# Patient Record
Sex: MALE | Race: WHITE | NOT HISPANIC OR LATINO | Employment: FULL TIME | URBAN - METROPOLITAN AREA
[De-identification: names, ages, dates, MRNs, and addresses within clinical notes are randomized per-mention and may not be internally consistent; named-entity substitution may affect disease eponyms.]

---

## 2019-08-18 ENCOUNTER — HOSPITAL ENCOUNTER (INPATIENT)
Facility: HOSPITAL | Age: 64
LOS: 5 days | Discharge: HOME | End: 2019-08-23
Attending: EMERGENCY MEDICINE | Admitting: INTERNAL MEDICINE

## 2019-08-18 ENCOUNTER — APPOINTMENT (OUTPATIENT)
Dept: GENERAL RADIOLOGY | Facility: HOSPITAL | Age: 64
End: 2019-08-18

## 2019-08-18 DIAGNOSIS — I21.19 ACUTE MI, INFEROPOSTERIOR WALL (HCC): Primary | ICD-10-CM

## 2019-08-18 DIAGNOSIS — I49.01 VENTRICULAR FIBRILLATION (HCC): ICD-10-CM

## 2019-08-18 LAB
ALBUMIN SERPL-MCNC: 4.9 G/DL (ref 3.5–5.2)
ALBUMIN/GLOB SERPL: 1.6 G/DL
ALP SERPL-CCNC: 70 U/L (ref 39–117)
ALT SERPL W P-5'-P-CCNC: 29 U/L (ref 1–41)
ANION GAP SERPL CALCULATED.3IONS-SCNC: 17.3 MMOL/L (ref 5–15)
AST SERPL-CCNC: 35 U/L (ref 1–40)
BASOPHILS # BLD AUTO: 0.17 10*3/MM3 (ref 0–0.2)
BASOPHILS NFR BLD AUTO: 1 % (ref 0–1.5)
BILIRUB SERPL-MCNC: 0.4 MG/DL (ref 0.2–1.2)
BUN BLD-MCNC: 12 MG/DL (ref 8–23)
BUN/CREAT SERPL: 8.6 (ref 7–25)
CALCIUM SPEC-SCNC: 9.7 MG/DL (ref 8.6–10.5)
CHLORIDE SERPL-SCNC: 95 MMOL/L (ref 98–107)
CO2 SERPL-SCNC: 26.7 MMOL/L (ref 22–29)
CREAT BLD-MCNC: 1.4 MG/DL (ref 0.76–1.27)
DEPRECATED RDW RBC AUTO: 53.2 FL (ref 37–54)
EOSINOPHIL # BLD AUTO: 0.56 10*3/MM3 (ref 0–0.4)
EOSINOPHIL NFR BLD AUTO: 3.4 % (ref 0.3–6.2)
ERYTHROCYTE [DISTWIDTH] IN BLOOD BY AUTOMATED COUNT: 15.3 % (ref 12.3–15.4)
GFR SERPL CREATININE-BSD FRML MDRD: 51 ML/MIN/1.73
GLOBULIN UR ELPH-MCNC: 3 GM/DL
GLUCOSE BLD-MCNC: 223 MG/DL (ref 65–99)
GLUCOSE BLDC GLUCOMTR-MCNC: 224 MG/DL (ref 70–130)
HCT VFR BLD AUTO: 50 % (ref 37.5–51)
HGB BLD-MCNC: 15.9 G/DL (ref 13–17.7)
HOLD SPECIMEN: NORMAL
HOLD SPECIMEN: NORMAL
IMM GRANULOCYTES # BLD AUTO: 0.08 10*3/MM3 (ref 0–0.05)
IMM GRANULOCYTES NFR BLD AUTO: 0.5 % (ref 0–0.5)
INR PPP: 0.91 (ref 0.9–1.1)
LYMPHOCYTES # BLD AUTO: 4.5 10*3/MM3 (ref 0.7–3.1)
LYMPHOCYTES NFR BLD AUTO: 27.6 % (ref 19.6–45.3)
MAGNESIUM SERPL-MCNC: 1.6 MG/DL (ref 1.6–2.4)
MCH RBC QN AUTO: 30 PG (ref 26.6–33)
MCHC RBC AUTO-ENTMCNC: 31.8 G/DL (ref 31.5–35.7)
MCV RBC AUTO: 94.3 FL (ref 79–97)
MONOCYTES # BLD AUTO: 0.83 10*3/MM3 (ref 0.1–0.9)
MONOCYTES NFR BLD AUTO: 5.1 % (ref 5–12)
NEUTROPHILS # BLD AUTO: 10.16 10*3/MM3 (ref 1.7–7)
NEUTROPHILS NFR BLD AUTO: 62.4 % (ref 42.7–76)
NRBC BLD AUTO-RTO: 0.1 /100 WBC (ref 0–0.2)
PLATELET # BLD AUTO: 450 10*3/MM3 (ref 140–450)
PMV BLD AUTO: 11.8 FL (ref 6–12)
POTASSIUM BLD-SCNC: 3.4 MMOL/L (ref 3.5–5.2)
POTASSIUM BLD-SCNC: 4.8 MMOL/L (ref 3.5–5.2)
PROT SERPL-MCNC: 7.9 G/DL (ref 6–8.5)
PROTHROMBIN TIME: 11.9 SECONDS (ref 11.7–14.2)
RBC # BLD AUTO: 5.3 10*6/MM3 (ref 4.14–5.8)
SODIUM BLD-SCNC: 139 MMOL/L (ref 136–145)
TROPONIN T SERPL-MCNC: 2.92 NG/ML (ref 0–0.03)
TROPONIN T SERPL-MCNC: <0.01 NG/ML (ref 0–0.03)
WBC NRBC COR # BLD: 16.3 10*3/MM3 (ref 3.4–10.8)
WHOLE BLOOD HOLD SPECIMEN: NORMAL
WHOLE BLOOD HOLD SPECIMEN: NORMAL

## 2019-08-18 PROCEDURE — C1725 CATH, TRANSLUMIN NON-LASER: HCPCS | Performed by: INTERNAL MEDICINE

## 2019-08-18 PROCEDURE — 92950 HEART/LUNG RESUSCITATION CPR: CPT

## 2019-08-18 PROCEDURE — 4A023N7 MEASUREMENT OF CARDIAC SAMPLING AND PRESSURE, LEFT HEART, PERCUTANEOUS APPROACH: ICD-10-PCS | Performed by: INTERNAL MEDICINE

## 2019-08-18 PROCEDURE — 25010000002 BH (CUPID ONLY) ADENOSINE 6 MG/100ML MIXTURE: Performed by: INTERNAL MEDICINE

## 2019-08-18 PROCEDURE — 93458 L HRT ARTERY/VENTRICLE ANGIO: CPT | Performed by: INTERNAL MEDICINE

## 2019-08-18 PROCEDURE — 93005 ELECTROCARDIOGRAM TRACING: CPT | Performed by: EMERGENCY MEDICINE

## 2019-08-18 PROCEDURE — B2151ZZ FLUOROSCOPY OF LEFT HEART USING LOW OSMOLAR CONTRAST: ICD-10-PCS | Performed by: INTERNAL MEDICINE

## 2019-08-18 PROCEDURE — 84484 ASSAY OF TROPONIN QUANT: CPT | Performed by: EMERGENCY MEDICINE

## 2019-08-18 PROCEDURE — 25010000002 MIDAZOLAM PER 1 MG: Performed by: INTERNAL MEDICINE

## 2019-08-18 PROCEDURE — 71045 X-RAY EXAM CHEST 1 VIEW: CPT

## 2019-08-18 PROCEDURE — C9606 PERC D-E COR REVASC W AMI S: HCPCS | Performed by: INTERNAL MEDICINE

## 2019-08-18 PROCEDURE — 25010000002 AMIODARONE PER 30 MG

## 2019-08-18 PROCEDURE — 80053 COMPREHEN METABOLIC PANEL: CPT | Performed by: EMERGENCY MEDICINE

## 2019-08-18 PROCEDURE — 82962 GLUCOSE BLOOD TEST: CPT

## 2019-08-18 PROCEDURE — 93005 ELECTROCARDIOGRAM TRACING: CPT | Performed by: INTERNAL MEDICINE

## 2019-08-18 PROCEDURE — C1874 STENT, COATED/COV W/DEL SYS: HCPCS | Performed by: INTERNAL MEDICINE

## 2019-08-18 PROCEDURE — 85347 COAGULATION TIME ACTIVATED: CPT

## 2019-08-18 PROCEDURE — C1894 INTRO/SHEATH, NON-LASER: HCPCS | Performed by: INTERNAL MEDICINE

## 2019-08-18 PROCEDURE — 85025 COMPLETE CBC W/AUTO DIFF WBC: CPT | Performed by: EMERGENCY MEDICINE

## 2019-08-18 PROCEDURE — 99153 MOD SED SAME PHYS/QHP EA: CPT | Performed by: INTERNAL MEDICINE

## 2019-08-18 PROCEDURE — 25010000002 MAGNESIUM SULFATE IN D5W 1G/100ML (PREMIX) 1-5 GM/100ML-% SOLUTION: Performed by: INTERNAL MEDICINE

## 2019-08-18 PROCEDURE — 83735 ASSAY OF MAGNESIUM: CPT | Performed by: INTERNAL MEDICINE

## 2019-08-18 PROCEDURE — 84132 ASSAY OF SERUM POTASSIUM: CPT | Performed by: INTERNAL MEDICINE

## 2019-08-18 PROCEDURE — 25010000002 EPTIFIBATIDE PER 5 MG: Performed by: INTERNAL MEDICINE

## 2019-08-18 PROCEDURE — 0 IOPAMIDOL PER 1 ML: Performed by: INTERNAL MEDICINE

## 2019-08-18 PROCEDURE — 25010000002 FENTANYL CITRATE (PF) 100 MCG/2ML SOLUTION: Performed by: INTERNAL MEDICINE

## 2019-08-18 PROCEDURE — 93005 ELECTROCARDIOGRAM TRACING: CPT

## 2019-08-18 PROCEDURE — C1769 GUIDE WIRE: HCPCS | Performed by: INTERNAL MEDICINE

## 2019-08-18 PROCEDURE — 92960 CARDIOVERSION ELECTRIC EXT: CPT

## 2019-08-18 PROCEDURE — 99284 EMERGENCY DEPT VISIT MOD MDM: CPT

## 2019-08-18 PROCEDURE — 25010000002 MORPHINE SULFATE (PF) 2 MG/ML SOLUTION: Performed by: INTERNAL MEDICINE

## 2019-08-18 PROCEDURE — 027034Z DILATION OF CORONARY ARTERY, ONE ARTERY WITH DRUG-ELUTING INTRALUMINAL DEVICE, PERCUTANEOUS APPROACH: ICD-10-PCS | Performed by: INTERNAL MEDICINE

## 2019-08-18 PROCEDURE — C1887 CATHETER, GUIDING: HCPCS

## 2019-08-18 PROCEDURE — B2111ZZ FLUOROSCOPY OF MULTIPLE CORONARY ARTERIES USING LOW OSMOLAR CONTRAST: ICD-10-PCS | Performed by: INTERNAL MEDICINE

## 2019-08-18 PROCEDURE — 25010000002 HEPARIN (PORCINE) PER 1000 UNITS: Performed by: INTERNAL MEDICINE

## 2019-08-18 PROCEDURE — 25010000002 ONDANSETRON PER 1 MG: Performed by: INTERNAL MEDICINE

## 2019-08-18 PROCEDURE — 85610 PROTHROMBIN TIME: CPT | Performed by: EMERGENCY MEDICINE

## 2019-08-18 PROCEDURE — C1887 CATHETER, GUIDING: HCPCS | Performed by: INTERNAL MEDICINE

## 2019-08-18 PROCEDURE — 25010000002 PHENYLEPHRINE PER 1 ML: Performed by: INTERNAL MEDICINE

## 2019-08-18 PROCEDURE — 99223 1ST HOSP IP/OBS HIGH 75: CPT | Performed by: INTERNAL MEDICINE

## 2019-08-18 PROCEDURE — 92941 PRQ TRLML REVSC TOT OCCL AMI: CPT | Performed by: INTERNAL MEDICINE

## 2019-08-18 PROCEDURE — 93010 ELECTROCARDIOGRAM REPORT: CPT | Performed by: INTERNAL MEDICINE

## 2019-08-18 PROCEDURE — 99152 MOD SED SAME PHYS/QHP 5/>YRS: CPT | Performed by: INTERNAL MEDICINE

## 2019-08-18 DEVICE — XIENCE SIERRA™ EVEROLIMUS ELUTING CORONARY STENT SYSTEM 4.00 MM X 38 MM / RAPID-EXCHANGE
Type: IMPLANTABLE DEVICE | Status: FUNCTIONAL
Brand: XIENCE SIERRA™

## 2019-08-18 DEVICE — XIENCE SIERRA™ EVEROLIMUS ELUTING CORONARY STENT SYSTEM 4.00 MM X 23 MM / RAPID-EXCHANGE
Type: IMPLANTABLE DEVICE | Status: FUNCTIONAL
Brand: XIENCE SIERRA™

## 2019-08-18 RX ORDER — SODIUM CHLORIDE 0.9 % (FLUSH) 0.9 %
10 SYRINGE (ML) INJECTION AS NEEDED
Status: DISCONTINUED | OUTPATIENT
Start: 2019-08-18 | End: 2019-08-23 | Stop reason: HOSPADM

## 2019-08-18 RX ORDER — HYDROCHLOROTHIAZIDE 12.5 MG/1
25 CAPSULE, GELATIN COATED ORAL DAILY
COMMUNITY
End: 2019-08-23 | Stop reason: HOSPADM

## 2019-08-18 RX ORDER — EPTIFIBATIDE 0.75 MG/ML
INJECTION, SOLUTION INTRAVENOUS CONTINUOUS PRN
Status: COMPLETED | OUTPATIENT
Start: 2019-08-18 | End: 2019-08-18

## 2019-08-18 RX ORDER — MAGNESIUM SULFATE 1 G/100ML
1 INJECTION INTRAVENOUS AS NEEDED
Status: DISCONTINUED | OUTPATIENT
Start: 2019-08-18 | End: 2019-08-23 | Stop reason: HOSPADM

## 2019-08-18 RX ORDER — FENOFIBRATE 145 MG/1
145 TABLET, COATED ORAL DAILY
COMMUNITY
End: 2019-08-23 | Stop reason: HOSPADM

## 2019-08-18 RX ORDER — ASPIRIN 81 MG/1
TABLET, CHEWABLE ORAL
Status: COMPLETED | OUTPATIENT
Start: 2019-08-18 | End: 2019-08-18

## 2019-08-18 RX ORDER — POTASSIUM CHLORIDE 750 MG/1
40 CAPSULE, EXTENDED RELEASE ORAL AS NEEDED
Status: DISCONTINUED | OUTPATIENT
Start: 2019-08-18 | End: 2019-08-23 | Stop reason: HOSPADM

## 2019-08-18 RX ORDER — HYDROCODONE BITARTRATE AND ACETAMINOPHEN 10; 325 MG/1; MG/1
1 TABLET ORAL EVERY 6 HOURS PRN
COMMUNITY

## 2019-08-18 RX ORDER — LIDOCAINE HYDROCHLORIDE 20 MG/ML
INJECTION, SOLUTION INFILTRATION; PERINEURAL AS NEEDED
Status: DISCONTINUED | OUTPATIENT
Start: 2019-08-18 | End: 2019-08-18 | Stop reason: HOSPADM

## 2019-08-18 RX ORDER — HEPARIN SODIUM 5000 [USP'U]/ML
INJECTION, SOLUTION INTRAVENOUS; SUBCUTANEOUS
Status: COMPLETED | OUTPATIENT
Start: 2019-08-18 | End: 2019-08-18

## 2019-08-18 RX ORDER — HEPARIN SODIUM 1000 [USP'U]/ML
INJECTION, SOLUTION INTRAVENOUS; SUBCUTANEOUS AS NEEDED
Status: DISCONTINUED | OUTPATIENT
Start: 2019-08-18 | End: 2019-08-18 | Stop reason: HOSPADM

## 2019-08-18 RX ORDER — ONDANSETRON 2 MG/ML
4 INJECTION INTRAMUSCULAR; INTRAVENOUS EVERY 4 HOURS PRN
Status: DISCONTINUED | OUTPATIENT
Start: 2019-08-18 | End: 2019-08-23 | Stop reason: HOSPADM

## 2019-08-18 RX ORDER — TAMSULOSIN HYDROCHLORIDE 0.4 MG/1
1 CAPSULE ORAL NIGHTLY
COMMUNITY

## 2019-08-18 RX ORDER — PANTOPRAZOLE SODIUM 40 MG/1
40 TABLET, DELAYED RELEASE ORAL DAILY
COMMUNITY

## 2019-08-18 RX ORDER — NITROGLYCERIN 20 MG/100ML
INJECTION INTRAVENOUS
Status: COMPLETED | OUTPATIENT
Start: 2019-08-18 | End: 2019-08-18

## 2019-08-18 RX ORDER — ONDANSETRON 2 MG/ML
INJECTION INTRAMUSCULAR; INTRAVENOUS AS NEEDED
Status: DISCONTINUED | OUTPATIENT
Start: 2019-08-18 | End: 2019-08-18 | Stop reason: HOSPADM

## 2019-08-18 RX ORDER — SODIUM NITROPRUSSIDE 25 MG/ML
INJECTION INTRAVENOUS AS NEEDED
Status: DISCONTINUED | OUTPATIENT
Start: 2019-08-18 | End: 2019-08-18 | Stop reason: HOSPADM

## 2019-08-18 RX ORDER — NITROGLYCERIN 20 MG/100ML
INJECTION INTRAVENOUS CONTINUOUS PRN
Status: COMPLETED | OUTPATIENT
Start: 2019-08-18 | End: 2019-08-18

## 2019-08-18 RX ORDER — AMLODIPINE BESYLATE 2.5 MG/1
2.5 TABLET ORAL DAILY
COMMUNITY
End: 2019-08-23 | Stop reason: HOSPADM

## 2019-08-18 RX ORDER — FLUTICASONE PROPIONATE 50 MCG
2 SPRAY, SUSPENSION (ML) NASAL DAILY PRN
COMMUNITY

## 2019-08-18 RX ORDER — MAGNESIUM SULFATE HEPTAHYDRATE 40 MG/ML
2 INJECTION, SOLUTION INTRAVENOUS AS NEEDED
Status: DISCONTINUED | OUTPATIENT
Start: 2019-08-18 | End: 2019-08-23 | Stop reason: HOSPADM

## 2019-08-18 RX ORDER — ASPIRIN 325 MG
325 TABLET ORAL ONCE
Status: DISCONTINUED | OUTPATIENT
Start: 2019-08-18 | End: 2019-08-19

## 2019-08-18 RX ORDER — MORPHINE SULFATE 2 MG/ML
INJECTION, SOLUTION INTRAMUSCULAR; INTRAVENOUS AS NEEDED
Status: DISCONTINUED | OUTPATIENT
Start: 2019-08-18 | End: 2019-08-18 | Stop reason: HOSPADM

## 2019-08-18 RX ORDER — EPTIFIBATIDE 0.75 MG/ML
2 INJECTION, SOLUTION INTRAVENOUS CONTINUOUS
Status: DISPENSED | OUTPATIENT
Start: 2019-08-18 | End: 2019-08-19

## 2019-08-18 RX ORDER — ATORVASTATIN CALCIUM 20 MG/1
40 TABLET, FILM COATED ORAL NIGHTLY
Status: DISCONTINUED | OUTPATIENT
Start: 2019-08-18 | End: 2019-08-23 | Stop reason: HOSPADM

## 2019-08-18 RX ORDER — FENTANYL CITRATE 50 UG/ML
INJECTION, SOLUTION INTRAMUSCULAR; INTRAVENOUS AS NEEDED
Status: DISCONTINUED | OUTPATIENT
Start: 2019-08-18 | End: 2019-08-18 | Stop reason: HOSPADM

## 2019-08-18 RX ORDER — HYDROCODONE BITARTRATE AND ACETAMINOPHEN 5; 325 MG/1; MG/1
2 TABLET ORAL EVERY 6 HOURS PRN
Status: DISCONTINUED | OUTPATIENT
Start: 2019-08-18 | End: 2019-08-23 | Stop reason: HOSPADM

## 2019-08-18 RX ORDER — EPTIFIBATIDE 20 MG/10ML
180 INJECTION INTRAVENOUS ONCE
Status: DISCONTINUED | OUTPATIENT
Start: 2019-08-18 | End: 2019-08-20

## 2019-08-18 RX ORDER — POTASSIUM CHLORIDE 1.5 G/1.77G
40 POWDER, FOR SOLUTION ORAL AS NEEDED
Status: DISCONTINUED | OUTPATIENT
Start: 2019-08-18 | End: 2019-08-23 | Stop reason: HOSPADM

## 2019-08-18 RX ORDER — LIDOCAINE HYDROCHLORIDE ANHYDROUS AND DEXTROSE MONOHYDRATE 5; 400 G/100ML; MG/100ML
1 INJECTION, SOLUTION INTRAVENOUS CONTINUOUS
Status: DISCONTINUED | OUTPATIENT
Start: 2019-08-18 | End: 2019-08-19

## 2019-08-18 RX ORDER — NITROGLYCERIN 20 MG/100ML
5-200 INJECTION INTRAVENOUS
Status: DISCONTINUED | OUTPATIENT
Start: 2019-08-18 | End: 2019-08-19

## 2019-08-18 RX ORDER — MIDAZOLAM HYDROCHLORIDE 1 MG/ML
INJECTION INTRAMUSCULAR; INTRAVENOUS AS NEEDED
Status: DISCONTINUED | OUTPATIENT
Start: 2019-08-18 | End: 2019-08-18 | Stop reason: HOSPADM

## 2019-08-18 RX ORDER — BACLOFEN 10 MG/1
10 TABLET ORAL 3 TIMES DAILY
COMMUNITY

## 2019-08-18 RX ORDER — ACETAMINOPHEN 325 MG/1
650 TABLET ORAL EVERY 4 HOURS PRN
Status: DISCONTINUED | OUTPATIENT
Start: 2019-08-18 | End: 2019-08-23 | Stop reason: HOSPADM

## 2019-08-18 RX ORDER — METOPROLOL SUCCINATE 25 MG/1
25 TABLET, EXTENDED RELEASE ORAL DAILY
COMMUNITY
End: 2019-08-30 | Stop reason: SDUPTHER

## 2019-08-18 RX ORDER — ASPIRIN 81 MG/1
81 TABLET ORAL DAILY
Status: DISCONTINUED | OUTPATIENT
Start: 2019-08-18 | End: 2019-08-23 | Stop reason: HOSPADM

## 2019-08-18 RX ORDER — CLOPIDOGREL BISULFATE 75 MG/1
75 TABLET ORAL DAILY
Status: DISCONTINUED | OUTPATIENT
Start: 2019-08-19 | End: 2019-08-23 | Stop reason: HOSPADM

## 2019-08-18 RX ORDER — ASPIRIN 81 MG/1
81 TABLET, CHEWABLE ORAL DAILY
COMMUNITY
End: 2019-10-02

## 2019-08-18 RX ORDER — SODIUM CHLORIDE 9 MG/ML
INJECTION, SOLUTION INTRAVENOUS CONTINUOUS PRN
Status: COMPLETED | OUTPATIENT
Start: 2019-08-18 | End: 2019-08-18

## 2019-08-18 RX ORDER — HYDROCODONE BITARTRATE AND ACETAMINOPHEN 5; 325 MG/1; MG/1
1 TABLET ORAL EVERY 4 HOURS PRN
Status: DISCONTINUED | OUTPATIENT
Start: 2019-08-18 | End: 2019-08-18

## 2019-08-18 RX ORDER — NABUMETONE 500 MG/1
500 TABLET, FILM COATED ORAL 2 TIMES DAILY
COMMUNITY
End: 2019-08-23 | Stop reason: HOSPADM

## 2019-08-18 RX ORDER — CLOPIDOGREL BISULFATE 75 MG/1
TABLET ORAL
Status: COMPLETED | OUTPATIENT
Start: 2019-08-18 | End: 2019-08-18

## 2019-08-18 RX ADMIN — HEPARIN SODIUM 4000 UNITS: 5000 INJECTION, SOLUTION INTRAVENOUS; SUBCUTANEOUS at 13:53

## 2019-08-18 RX ADMIN — METOPROLOL TARTRATE 25 MG: 25 TABLET ORAL at 20:02

## 2019-08-18 RX ADMIN — EPTIFIBATIDE 2 MCG/KG/MIN: 0.75 INJECTION, SOLUTION INTRAVENOUS at 23:51

## 2019-08-18 RX ADMIN — METOPROLOL TARTRATE 5 MG: 5 INJECTION, SOLUTION INTRAVENOUS at 17:20

## 2019-08-18 RX ADMIN — METOPROLOL TARTRATE 5 MG: 5 INJECTION, SOLUTION INTRAVENOUS at 16:54

## 2019-08-18 RX ADMIN — NITROGLYCERIN 10 MCG/MIN: 20 INJECTION INTRAVENOUS at 13:46

## 2019-08-18 RX ADMIN — EPTIFIBATIDE 2 MCG/KG/MIN: 0.75 INJECTION, SOLUTION INTRAVENOUS at 20:02

## 2019-08-18 RX ADMIN — ASPIRIN 324 MG: 81 TABLET, CHEWABLE ORAL at 13:45

## 2019-08-18 RX ADMIN — HYDROCODONE BITARTRATE AND ACETAMINOPHEN 2 TABLET: 5; 325 TABLET ORAL at 23:35

## 2019-08-18 RX ADMIN — HYDROCODONE BITARTRATE AND ACETAMINOPHEN 2 TABLET: 5; 325 TABLET ORAL at 16:54

## 2019-08-18 RX ADMIN — CLOPIDOGREL BISULFATE 600 MG: 75 TABLET ORAL at 13:51

## 2019-08-18 RX ADMIN — MAGNESIUM SULFATE 1 G: 1 INJECTION INTRAVENOUS at 23:50

## 2019-08-18 RX ADMIN — ATORVASTATIN CALCIUM 40 MG: 20 TABLET, FILM COATED ORAL at 20:02

## 2019-08-18 RX ADMIN — METOPROLOL TARTRATE 5 MG: 5 INJECTION, SOLUTION INTRAVENOUS at 17:45

## 2019-08-18 RX ADMIN — LIDOCAINE HYDROCHLORIDE 200 MG: 20 INJECTION, SOLUTION INTRAVENOUS at 13:45

## 2019-08-18 NOTE — H&P
Date of Hospital Visit: 19  Encounter Provider: Rod Gomes MD  Place of Service: [facility] CARDIOLOGY  Patient Name: Frank Patel  :1955  [id number]    Chief complaint: Chest pain    History of Present Illness: This is a 64-year-old gentleman with a history of A. fib for which she is been anticoagulated with an aspirin who comes in with acute chest pain for the last 2 hours substernal in origin.  Occluded shortness of breath no nausea or vomiting in the ER he is found to have an acute inferolateral and a little bit of an anterior ST elevation infarct.  He is not had any history of bleeding does not have diabetes hypertension hyperlipidemia or tobacco abuse he is not had any significant history of lung or kidney problems    No past medical history on file.    No past surgical history on file.      (Not in a hospital admission)    Current Meds  No current facility-administered medications on file prior to encounter.      No current outpatient medications on file prior to encounter.       Social History     Socioeconomic History   • Marital status:      Spouse name: Not on file   • Number of children: Not on file   • Years of education: Not on file   • Highest education level: Not on file       Family Hx: Non-contributory    REVIEW OF SYSTEMS:   ROS was performed and is negative except as outlined in HPI     REVIEW OF SYSTEMS:   CONSTITUTIONAL: No weight loss, fever, chills, weakness or fatigue.   HEENT: Eyes: No visual loss, blurred vision, double vision or yellow sclerae. Ears, Nose, Throat: No hearing loss, sneezing, congestion, runny nose or sore throat.   SKIN: No rash or itching.     RESPIRATORY: No shortness of breath, hemoptysis, cough or sputum.   GASTROINTESTINAL: No anorexia, nausea, vomiting or diarrhea. No abdominal pain, bright red blood per rectum or melena.  NEUROLOGICAL: No headache, dizziness, syncope, paralysis, numbness or tingling in the  "extremities.  MUSCULOSKELETAL: No muscle, back pain, joint pain or stiffness.   HEMATOLOGIC: No anemia, bleeding or bruising.   LYMPHATICS: No enlarged nodes.  PSYCHIATRIC: No history of depression, anxiety, hallucinations.   ENDOCRINOLOGIC: No reports of sweating, cold or heat intolerance. No polyuria or polydipsia.        Objective:     Vitals:    08/18/19 1328 08/18/19 1330 08/18/19 1352   BP:   (!) 162/111   Pulse:  94 (!) 152   Resp: 20  20   Temp:  96.8 °F (36 °C)    TempSrc:  Tympanic    SpO2:  97% 100%   Weight:   113 kg (249 lb)   Height: 177.8 cm (70\")       Body mass index is 35.73 kg/m².  Flowsheet Rows      First Filed Value   Admission Height  177.8 cm (70\") Documented at 08/18/2019 1328   Admission Weight  113 kg (249 lb) Documented at 08/18/2019 1352          General Appearance:    Alert, oriented x 3, in no acute distress   Head:    Normocephalic, without obvious abnormality, atraumatic   Ears:    Ears appear intact with no abnormalities noted   Throat:   No oral lesions, dentition good   Neck:   No adenopathy, supple, trachea midline, no thyromegaly, no carotid bruit, no JVD   Lungs:    Breath sounds are equal and  clear to auscultation    Heart:   Normal S1 and S2, RRR, no murmur/gallop or rub   Abdomen:    Normal bowel sounds, obese, soft non-tender, non-distended, no organomegaly, no guarding   Extremities:   Moves all extremities well, no edema, no cyanosis, no redness   Pulses:   Pulses palpable and equal bilaterally. Normal radial pulses   Skin:   No bleeding, bruising or rash   Lymph nodes:   No palpable adenopathy     I personally viewed and interpreted the patient's EKG/Telemetry data    Assessment:  There are no hospital problems to display for this patient.      Plan he is having an acute inferior STEMI I think the anterior changes are probably going to be related to an PDA that extends to the apex.  While he was here in the emergency room he went into ventricular fibrillation had to be " shocked one time I have elected to put him on lidocaine because he was having a little bit of slow rhythm immediately after shock we have initiated heparin and clopidogrel therapy in the emergency room we are going to take him emergently to the Cath Lab as soon as a team gets here this is obviously a life-threatening situation further decisions will be based on the findings at the time of his cath

## 2019-08-18 NOTE — ED NOTES
Pt had episode of VF. Pads placed, defibrillation preformed. Pulses returned. Pt awake alert X4. Dr. Gomes at bedside. Awaiting CATH team arrival.      Yogi Nassar RN  08/18/19 5658

## 2019-08-18 NOTE — ED NOTES
"Pt reports waking up this morning with pain in his upper back. Describes the pain as a \"pressure\" that radiates to his chest. Pt denies injury.       Luisa Chu, RN  08/18/19 2607    "

## 2019-08-18 NOTE — ED PROVIDER NOTES
EMERGENCY DEPARTMENT ENCOUNTER    CHIEF COMPLAINT  Chief Complaint: chest pain  History given by: patient  History limited by: none  Room Number: [facility]  PMD: Cam Duran MD      HPI:  Pt is a 64 y.o. male who presents complaining of constant chest pressure that began around 1130 upon waking. Pain radiates through to the back but not to the LUE. He has associated nausea, faint light headedness, and diaphoresis; but, denies vomiting, HA, fever, chills, and other complaints. H/o afib and takes 81 mg ASA. He denies prior MI and h/o DM. Pt is not a smoker and uses ETOH rarely. Family cardiac h/o CHF (mother).      PAST MEDICAL HISTORY  Active Ambulatory Problems     Diagnosis Date Noted   • No Active Ambulatory Problems     Resolved Ambulatory Problems     Diagnosis Date Noted   • No Resolved Ambulatory Problems     No Additional Past Medical History       PAST SURGICAL HISTORY  No past surgical history on file.    FAMILY HISTORY  No family history on file.    SOCIAL HISTORY  Social History     Socioeconomic History   • Marital status:      Spouse name: Not on file   • Number of children: Not on file   • Years of education: Not on file   • Highest education level: Not on file       ALLERGIES  Patient has no known allergies.    REVIEW OF SYSTEMS  Review of Systems   Constitutional: Negative for activity change, appetite change and fever.   HENT: Negative for congestion and sore throat.    Eyes: Negative.    Respiratory: Positive for shortness of breath. Negative for cough.    Cardiovascular: Positive for chest pain. Negative for leg swelling.   Gastrointestinal: Negative for abdominal pain, diarrhea and vomiting.   Endocrine: Negative.    Genitourinary: Negative for decreased urine volume and dysuria.   Musculoskeletal: Negative for neck pain.   Skin: Negative for rash and wound.   Allergic/Immunologic: Negative.    Neurological: Negative for weakness, numbness and headaches.   Hematological: Negative.     Psychiatric/Behavioral: Negative.    All other systems reviewed and are negative.      PHYSICAL EXAM  ED Triage Vitals   Temp Heart Rate Resp BP SpO2   08/18/19 1330 08/18/19 1330 08/18/19 1328 08/18/19 1352 08/18/19 1330   96.8 °F (36 °C) 94 20 (!) 162/111 97 %      Temp src Heart Rate Source Patient Position BP Location FiO2 (%)   08/18/19 1330 -- -- -- --   Tympanic             Physical Exam   Constitutional: He appears distressed (moderate).   HENT:   Head: Normocephalic and atraumatic.   Mouth/Throat: Mucous membranes are normal.   Eyes: Pupils are equal, round, and reactive to light.   Neck: Normal range of motion.   Cardiovascular: Normal rate. An irregularly irregular rhythm present.   No murmur heard.  Pulmonary/Chest: Effort normal and breath sounds normal. No respiratory distress.   Abdominal: Soft. Bowel sounds are normal. He exhibits no distension. There is no tenderness. There is no rebound and no guarding.   Healed surgical scar to abd   Musculoskeletal: Normal range of motion. He exhibits no edema.   No calf tenderness   Neurological: He is alert. He has normal sensation and normal strength.   Skin: Skin is warm and dry.   Psychiatric: Affect normal.       LAB RESULTS  Lab Results (last 24 hours)     Procedure Component Value Units Date/Time    CBC & Differential [[id number]] Collected:  08/18/19 1339    Specimen:  Blood Updated:  08/18/19 1400    Narrative:       The following orders were created for panel order CBC & Differential.  Procedure                               Abnormality         Status                     ---------                               -----------         ------                     CBC Auto Differential[[id number]]        Abnormal            Final result                 Please view results for these tests on the individual orders.    Comprehensive Metabolic Panel [[id number]]  (Abnormal) Collected:  08/18/19 1339    Specimen:  Blood Updated:  08/18/19 1423     Glucose 223 mg/dL       BUN 12 mg/dL      Creatinine 1.40 mg/dL      Sodium 139 mmol/L      Potassium 3.4 mmol/L      Chloride 95 mmol/L      CO2 26.7 mmol/L      Calcium 9.7 mg/dL      Total Protein 7.9 g/dL      Albumin 4.90 g/dL      ALT (SGPT) 29 U/L      AST (SGOT) 35 U/L      Alkaline Phosphatase 70 U/L      Total Bilirubin 0.4 mg/dL      eGFR Non African Amer 51 mL/min/1.73      Globulin 3.0 gm/dL      A/G Ratio 1.6 g/dL      BUN/Creatinine Ratio 8.6     Anion Gap 17.3 mmol/L     Narrative:       GFR Normal >60  Chronic Kidney Disease <60  Kidney Failure <15    Troponin [[id number]]  (Normal) Collected:  08/18/19 1339    Specimen:  Blood Updated:  08/18/19 1423     Troponin T <0.010 ng/mL     Narrative:       Troponin T Reference Range:  <= 0.03 ng/mL-   Negative for AMI  >0.03 ng/mL-     Abnormal for myocardial necrosis.  Clinicians would have to utilize clinical acumen, EKG, Troponin and serial changes to determine if it is an Acute Myocardial Infarction or myocardial injury due to an underlying chronic condition.     CBC Auto Differential [[id number]]  (Abnormal) Collected:  08/18/19 1339    Specimen:  Blood Updated:  08/18/19 1400     WBC 16.30 10*3/mm3      RBC 5.30 10*6/mm3      Hemoglobin 15.9 g/dL      Hematocrit 50.0 %      MCV 94.3 fL      MCH 30.0 pg      MCHC 31.8 g/dL      RDW 15.3 %      RDW-SD 53.2 fl      MPV 11.8 fL      Platelets 450 10*3/mm3      Neutrophil % 62.4 %      Lymphocyte % 27.6 %      Monocyte % 5.1 %      Eosinophil % 3.4 %      Basophil % 1.0 %      Immature Grans % 0.5 %      Neutrophils, Absolute 10.16 10*3/mm3      Lymphocytes, Absolute 4.50 10*3/mm3      Monocytes, Absolute 0.83 10*3/mm3      Eosinophils, Absolute 0.56 10*3/mm3      Basophils, Absolute 0.17 10*3/mm3      Immature Grans, Absolute 0.08 10*3/mm3      nRBC 0.1 /100 WBC     Protime-INR [[id number]]  (Normal) Collected:  08/18/19 1339    Specimen:  Blood Updated:  08/18/19 1409     Protime 11.9 Seconds      INR 0.91          I ordered  the above labs and reviewed the results    RADIOLOGY  XR Chest 1 View   Final Result   No evidence for acute pulmonary process. Cardiomegaly with   mild prominence of vascular markings. Follow-up as clinical indications   persist.       This report was finalized on 8/18/2019 1:54 PM by Dr. Wayne Krishnamurthy M.D.               I ordered the above noted radiological studies. Interpreted by radiologist. Reviewed by me in PACS.       PROCEDURES  CPR  Date/Time: 8/18/2019 1:54 PM  Performed by: Godfrey Alegre MD  Authorized by: Godfrey Alegre MD     Comments:      CPR Procedure    Upon my arrival the patient was in cardiopulmonary arrest.  Rhythm on arrival:ventricular fibrillation  Interventions:Patient was defibrillated/cardioverted. See resuscitation flowsheet.  Outcome: Cardiac monitor after ACLS intervention(s) showed atrial fib/flutter.  ROSC              Electrical Cardioversion  Date/Time: 8/18/2019 1:55 PM  Performed by: Godfrey Alegre MD  Authorized by: Godfrey Alegre MD     Consent:     Consent obtained:  Emergent situation  Pre-procedure details:     Pre-procedure rhythm: VF.    Electrode placement:  Anterior-lateral  Attempt one:     Cardioversion mode:  Asynchronous    Waveform:  Biphasic    Shock (Joules):  200    Shock outcome:  Conversion to other rhythm (afib)  Post-procedure details:     Patient status:  Awake (and alert)    Patient tolerance of procedure:  Tolerated well, no immediate complications  Critical Care  Performed by: Godfrey Alegre MD  Authorized by: Godfrey Alegre MD     Critical care provider statement:     Critical care time (minutes):  25    Critical care was necessary to treat or prevent imminent or life-threatening deterioration of the following conditions:  Cardiac failure    Critical care was time spent personally by me on the following activities:  Development of treatment plan with patient or surrogate, discussions with consultants, evaluation of patient's  response to treatment, examination of patient, interpretation of cardiac output measurements, obtaining history from patient or surrogate, ordering and performing treatments and interventions, ordering and review of laboratory studies, ordering and review of radiographic studies and re-evaluation of patient's condition      EKG          EKG time: 1332  Rhythm/Rate: Afib 91  QRS, axis: LBBB   ST and T waves: inferior and posterior MI     Interpreted Contemporaneously by me, independently viewed  No prior for comparison.       PROGRESS AND CONSULTS     1334  Team C called.    1340  Discussed pt w/ Dr. De Guzman, interventional cardiology. He will take to the cath labs.    1341  RN advised pt is VT, arrived pt to VF. Pt defibrillated at 200J. CPR for approximately 10 seconds w/ ROSC. Pt has been awake and alert ever since.    1342  Dr. De Guzman, interventional cardiology at bedside. Advised to start 150 mg lidocaine.    1353  Rechecked the pt. He is still awake and alert. Still afib on the monitor w/ ST elevation.    1405  Rechecked the pt. Pt is still awake and alert, VSS, Afib on the monitor. Pt is currently pain free.    MEDICAL DECISION MAKING  Results were reviewed/discussed with the patient and they were also made aware of online access. Pt also made aware that some labs, such as cultures, will not be resulted during ER visit and follow up with PMD is necessary.     MDM  Number of Diagnoses or Management Options  Acute MI, inferoposterior wall (CMS/HCC):   Ventricular fibrillation (CMS/HCC):      Amount and/or Complexity of Data Reviewed  Clinical lab tests: reviewed  Tests in the radiology section of CPT®: reviewed (CXR-No evidence for acute pulmonary process. Cardiomegaly with mild prominence of vascular markings)  Tests in the medicine section of CPT®: reviewed (See EKG procedure note.)  Discuss the patient with other providers: yes (Dr. De Guzman, interventional cardiology)  Independent visualization of images,  tracings, or specimens: yes    Patient Progress  Patient progress: stable         DIAGNOSIS  Final diagnoses:   Acute MI, inferoposterior wall (CMS/HCC)   Ventricular fibrillation (CMS/HCC)       DISPOSITION  Pt taken to cath lab    Latest Documented Vital Signs:  As of 2:43 PM  BP- (!) 162/111 HR- (!) 152 Temp- 96.8 °F (36 °C) (Tympanic) O2 sat- 100%    --  Documentation assistance provided by edwin Brambila for Dr. Alegre.  Information recorded by the scribe was done at my direction and has been verified and validated by me.       Vika Brambila  08/18/19 7321       Godfrey Alegre MD  08/18/19 1747

## 2019-08-18 NOTE — PLAN OF CARE
Problem: Patient Care Overview  Goal: Plan of Care Review  Outcome: Ongoing (interventions implemented as appropriate)   08/18/19 1923   Coping/Psychosocial   Plan of Care Reviewed With patient;spouse   OTHER   Outcome Summary Pt to CCU s/p STEMI. Pt. with episode of vfib in ER, shock x1. In cath lab pt. received stent x2 to RCA. Pt. to ccu on integrillin, lidocain, and nitro gtt. Currently chest pain free. Cath site wnl. Metoprolol administered for high heart rate and blood pressure. Continuing to monitor.        Problem: Pain, Chronic (Adult)  Goal: Identify Related Risk Factors and Signs and Symptoms  Outcome: Outcome(s) achieved Date Met: 08/18/19    Goal: Acceptable Pain/Comfort Level and Functional Ability  Outcome: Ongoing (interventions implemented as appropriate)      Problem: Fall Risk (Adult)  Goal: Identify Related Risk Factors and Signs and Symptoms  Outcome: Ongoing (interventions implemented as appropriate)    Goal: Absence of Fall  Outcome: Ongoing (interventions implemented as appropriate)      Problem: Skin Injury Risk (Adult)  Goal: Identify Related Risk Factors and Signs and Symptoms  Outcome: Ongoing (interventions implemented as appropriate)    Goal: Skin Health and Integrity  Outcome: Ongoing (interventions implemented as appropriate)      Problem: Cardiac: ACS (Acute Coronary Syndrome) (Adult)  Goal: Signs and Symptoms of Listed Potential Problems Will be Absent, Minimized or Managed (Cardiac: ACS)  Outcome: Ongoing (interventions implemented as appropriate)      Problem: Pain, Acute (Adult)  Goal: Identify Related Risk Factors and Signs and Symptoms  Outcome: Ongoing (interventions implemented as appropriate)    Goal: Acceptable Pain Control/Comfort Level  Outcome: Ongoing (interventions implemented as appropriate)

## 2019-08-19 ENCOUNTER — APPOINTMENT (OUTPATIENT)
Dept: CARDIOLOGY | Facility: HOSPITAL | Age: 64
End: 2019-08-19

## 2019-08-19 LAB
ACT BLD: 131 SECONDS (ref 82–152)
ACT BLD: 224 SECONDS (ref 82–152)
ACT BLD: 252 SECONDS (ref 82–152)
ANION GAP SERPL CALCULATED.3IONS-SCNC: 17.3 MMOL/L (ref 5–15)
BH CV ECHO MEAS - ACS: 2.2 CM
BH CV ECHO MEAS - AO MEAN PG (FULL): 0 MMHG
BH CV ECHO MEAS - AO MEAN PG: 1 MMHG
BH CV ECHO MEAS - AO ROOT AREA (BSA CORRECTED): 1.4
BH CV ECHO MEAS - AO ROOT AREA: 7.5 CM^2
BH CV ECHO MEAS - AO ROOT DIAM: 3.1 CM
BH CV ECHO MEAS - AO V2 MEAN: 53.6 CM/SEC
BH CV ECHO MEAS - AO V2 VTI: 11.9 CM
BH CV ECHO MEAS - AVA(I,A): 2.7 CM^2
BH CV ECHO MEAS - AVA(I,D): 2.7 CM^2
BH CV ECHO MEAS - BSA(HAYCOCK): 2.3 M^2
BH CV ECHO MEAS - BSA: 2.2 M^2
BH CV ECHO MEAS - BZI_BMI: 33.9 KILOGRAMS/M^2
BH CV ECHO MEAS - BZI_METRIC_HEIGHT: 177.8 CM
BH CV ECHO MEAS - BZI_METRIC_WEIGHT: 107.1 KG
BH CV ECHO MEAS - EDV(CUBED): 91.1 ML
BH CV ECHO MEAS - EDV(MOD-SP2): 106 ML
BH CV ECHO MEAS - EDV(MOD-SP4): 115 ML
BH CV ECHO MEAS - EDV(TEICH): 92.4 ML
BH CV ECHO MEAS - EF(CUBED): 34.9 %
BH CV ECHO MEAS - EF(MOD-BP): 35 %
BH CV ECHO MEAS - EF(MOD-SP2): 24.5 %
BH CV ECHO MEAS - EF(MOD-SP4): 37.4 %
BH CV ECHO MEAS - EF(TEICH): 28.7 %
BH CV ECHO MEAS - ESV(CUBED): 59.3 ML
BH CV ECHO MEAS - ESV(MOD-SP2): 80 ML
BH CV ECHO MEAS - ESV(MOD-SP4): 72 ML
BH CV ECHO MEAS - ESV(TEICH): 65.9 ML
BH CV ECHO MEAS - FS: 13.3 %
BH CV ECHO MEAS - IVS/LVPW: 1
BH CV ECHO MEAS - IVSD: 1.6 CM
BH CV ECHO MEAS - LA DIMENSION: 4.4 CM
BH CV ECHO MEAS - LA/AO: 1.4
BH CV ECHO MEAS - LAT PEAK E' VEL: 7 CM/SEC
BH CV ECHO MEAS - LV DIASTOLIC VOL/BSA (35-75): 51.4 ML/M^2
BH CV ECHO MEAS - LV MASS(C)D: 304.6 GRAMS
BH CV ECHO MEAS - LV MASS(C)DI: 136 GRAMS/M^2
BH CV ECHO MEAS - LV MEAN PG: 1 MMHG
BH CV ECHO MEAS - LV SYSTOLIC VOL/BSA (12-30): 32.2 ML/M^2
BH CV ECHO MEAS - LV V1 MEAN: 38.6 CM/SEC
BH CV ECHO MEAS - LV V1 VTI: 10.3 CM
BH CV ECHO MEAS - LVIDD: 4.5 CM
BH CV ECHO MEAS - LVIDS: 3.9 CM
BH CV ECHO MEAS - LVLD AP2: 7.3 CM
BH CV ECHO MEAS - LVLD AP4: 8.3 CM
BH CV ECHO MEAS - LVLS AP2: 7.2 CM
BH CV ECHO MEAS - LVLS AP4: 7.3 CM
BH CV ECHO MEAS - LVOT AREA (M): 3.1 CM^2
BH CV ECHO MEAS - LVOT AREA: 3.1 CM^2
BH CV ECHO MEAS - LVOT DIAM: 2 CM
BH CV ECHO MEAS - LVPWD: 1.6 CM
BH CV ECHO MEAS - MED PEAK E' VEL: 8 CM/SEC
BH CV ECHO MEAS - MV DEC SLOPE: 461 CM/SEC^2
BH CV ECHO MEAS - MV DEC TIME: 0.12 SEC
BH CV ECHO MEAS - MV E MAX VEL: 67.1 CM/SEC
BH CV ECHO MEAS - MV MEAN PG: 1 MMHG
BH CV ECHO MEAS - MV P1/2T MAX VEL: 86.9 CM/SEC
BH CV ECHO MEAS - MV P1/2T: 55.2 MSEC
BH CV ECHO MEAS - MV V2 MEAN: 34.9 CM/SEC
BH CV ECHO MEAS - MV V2 VTI: 23.3 CM
BH CV ECHO MEAS - MVA P1/2T LCG: 2.5 CM^2
BH CV ECHO MEAS - MVA(P1/2T): 4 CM^2
BH CV ECHO MEAS - MVA(VTI): 1.4 CM^2
BH CV ECHO MEAS - PA ACC SLOPE: 716 CM/SEC^2
BH CV ECHO MEAS - PA ACC TIME: 0.07 SEC
BH CV ECHO MEAS - PA MAX PG (FULL): 0.17 MMHG
BH CV ECHO MEAS - PA MAX PG: 0.96 MMHG
BH CV ECHO MEAS - PA PR(ACCEL): 45.7 MMHG
BH CV ECHO MEAS - PA V2 MAX: 48.9 CM/SEC
BH CV ECHO MEAS - PVA(V,A): 4.5 CM^2
BH CV ECHO MEAS - PVA(V,D): 4.5 CM^2
BH CV ECHO MEAS - QP/QS: 1.3
BH CV ECHO MEAS - RV MAX PG: 0.79 MMHG
BH CV ECHO MEAS - RV MEAN PG: 0 MMHG
BH CV ECHO MEAS - RV V1 MAX: 44.4 CM/SEC
BH CV ECHO MEAS - RV V1 MEAN: 27.9 CM/SEC
BH CV ECHO MEAS - RV V1 VTI: 8.5 CM
BH CV ECHO MEAS - RVOT AREA: 4.9 CM^2
BH CV ECHO MEAS - RVOT DIAM: 2.5 CM
BH CV ECHO MEAS - SI(AO): 40.1 ML/M^2
BH CV ECHO MEAS - SI(CUBED): 14.2 ML/M^2
BH CV ECHO MEAS - SI(LVOT): 14.4 ML/M^2
BH CV ECHO MEAS - SI(MOD-SP2): 11.6 ML/M^2
BH CV ECHO MEAS - SI(MOD-SP4): 19.2 ML/M^2
BH CV ECHO MEAS - SI(TEICH): 11.8 ML/M^2
BH CV ECHO MEAS - SV(AO): 89.8 ML
BH CV ECHO MEAS - SV(CUBED): 31.8 ML
BH CV ECHO MEAS - SV(LVOT): 32.4 ML
BH CV ECHO MEAS - SV(MOD-SP2): 26 ML
BH CV ECHO MEAS - SV(MOD-SP4): 43 ML
BH CV ECHO MEAS - SV(RVOT): 41.5 ML
BH CV ECHO MEAS - SV(TEICH): 26.5 ML
BH CV ECHO MEAS - TAPSE (>1.6): 1 CM2
BH CV ECHO MEASUREMENTS AVERAGE E/E' RATIO: 8.95
BH CV VAS BP RIGHT ARM: NORMAL MMHG
BH CV XLRA - RV BASE: 4 CM
BH CV XLRA - TDI S': 8 CM/SEC
BUN BLD-MCNC: 15 MG/DL (ref 8–23)
BUN/CREAT SERPL: 11.2 (ref 7–25)
CALCIUM SPEC-SCNC: 9.3 MG/DL (ref 8.6–10.5)
CHLORIDE SERPL-SCNC: 96 MMOL/L (ref 98–107)
CHOLEST SERPL-MCNC: 186 MG/DL (ref 0–200)
CO2 SERPL-SCNC: 21.7 MMOL/L (ref 22–29)
CREAT BLD-MCNC: 1.34 MG/DL (ref 0.76–1.27)
DEPRECATED RDW RBC AUTO: 53.6 FL (ref 37–54)
ERYTHROCYTE [DISTWIDTH] IN BLOOD BY AUTOMATED COUNT: 15.9 % (ref 12.3–15.4)
GFR SERPL CREATININE-BSD FRML MDRD: 54 ML/MIN/1.73
GLUCOSE BLD-MCNC: 202 MG/DL (ref 65–99)
GLUCOSE BLDC GLUCOMTR-MCNC: 158 MG/DL (ref 70–130)
GLUCOSE BLDC GLUCOMTR-MCNC: 176 MG/DL (ref 70–130)
GLUCOSE BLDC GLUCOMTR-MCNC: 185 MG/DL (ref 70–130)
GLUCOSE BLDC GLUCOMTR-MCNC: 187 MG/DL (ref 70–130)
HBA1C MFR BLD: 6.5 % (ref 4.8–5.6)
HCT VFR BLD AUTO: 47 % (ref 37.5–51)
HDLC SERPL-MCNC: 31 MG/DL (ref 40–60)
HGB BLD-MCNC: 14.8 G/DL (ref 13–17.7)
LDLC SERPL CALC-MCNC: 76 MG/DL (ref 0–100)
LDLC/HDLC SERPL: 2.46 {RATIO}
LEFT ATRIUM VOLUME INDEX: 34 ML/M2
MAXIMAL PREDICTED HEART RATE: 158 BPM
MCH RBC QN AUTO: 29.4 PG (ref 26.6–33)
MCHC RBC AUTO-ENTMCNC: 31.5 G/DL (ref 31.5–35.7)
MCV RBC AUTO: 93.4 FL (ref 79–97)
PLATELET # BLD AUTO: 403 10*3/MM3 (ref 140–450)
PMV BLD AUTO: 12.3 FL (ref 6–12)
POTASSIUM BLD-SCNC: 4.3 MMOL/L (ref 3.5–5.2)
RBC # BLD AUTO: 5.03 10*6/MM3 (ref 4.14–5.8)
SODIUM BLD-SCNC: 135 MMOL/L (ref 136–145)
STRESS TARGET HR: 134 BPM
TRIGL SERPL-MCNC: 393 MG/DL (ref 0–150)
TROPONIN T SERPL-MCNC: 7.14 NG/ML (ref 0–0.03)
VLDLC SERPL-MCNC: 78.6 MG/DL (ref 5–40)
WBC NRBC COR # BLD: 17.11 10*3/MM3 (ref 3.4–10.8)

## 2019-08-19 PROCEDURE — 25010000002 ONDANSETRON PER 1 MG: Performed by: INTERNAL MEDICINE

## 2019-08-19 PROCEDURE — 82962 GLUCOSE BLOOD TEST: CPT

## 2019-08-19 PROCEDURE — 93306 TTE W/DOPPLER COMPLETE: CPT

## 2019-08-19 PROCEDURE — 25010000002 PERFLUTREN (DEFINITY) 8.476 MG IN SODIUM CHLORIDE 0.9 % 10 ML INJECTION: Performed by: INTERNAL MEDICINE

## 2019-08-19 PROCEDURE — 25010000002 MORPHINE PER 10 MG: Performed by: INTERNAL MEDICINE

## 2019-08-19 PROCEDURE — 93010 ELECTROCARDIOGRAM REPORT: CPT | Performed by: INTERNAL MEDICINE

## 2019-08-19 PROCEDURE — 93005 ELECTROCARDIOGRAM TRACING: CPT | Performed by: INTERNAL MEDICINE

## 2019-08-19 PROCEDURE — 84484 ASSAY OF TROPONIN QUANT: CPT | Performed by: INTERNAL MEDICINE

## 2019-08-19 PROCEDURE — 93306 TTE W/DOPPLER COMPLETE: CPT | Performed by: INTERNAL MEDICINE

## 2019-08-19 PROCEDURE — 85027 COMPLETE CBC AUTOMATED: CPT | Performed by: INTERNAL MEDICINE

## 2019-08-19 PROCEDURE — 80048 BASIC METABOLIC PNL TOTAL CA: CPT | Performed by: INTERNAL MEDICINE

## 2019-08-19 PROCEDURE — 83036 HEMOGLOBIN GLYCOSYLATED A1C: CPT | Performed by: INTERNAL MEDICINE

## 2019-08-19 PROCEDURE — 25010000002 EPTIFIBATIDE PER 5 MG: Performed by: INTERNAL MEDICINE

## 2019-08-19 PROCEDURE — 99233 SBSQ HOSP IP/OBS HIGH 50: CPT | Performed by: INTERNAL MEDICINE

## 2019-08-19 PROCEDURE — 80061 LIPID PANEL: CPT | Performed by: INTERNAL MEDICINE

## 2019-08-19 RX ORDER — METOPROLOL SUCCINATE 25 MG/1
25 TABLET, EXTENDED RELEASE ORAL
Status: DISCONTINUED | OUTPATIENT
Start: 2019-08-19 | End: 2019-08-23 | Stop reason: HOSPADM

## 2019-08-19 RX ORDER — MORPHINE SULFATE 2 MG/ML
1 INJECTION, SOLUTION INTRAMUSCULAR; INTRAVENOUS ONCE
Status: COMPLETED | OUTPATIENT
Start: 2019-08-19 | End: 2019-08-19

## 2019-08-19 RX ORDER — LOSARTAN POTASSIUM 50 MG/1
50 TABLET ORAL DAILY
Status: DISCONTINUED | OUTPATIENT
Start: 2019-08-19 | End: 2019-08-20

## 2019-08-19 RX ADMIN — CLOPIDOGREL 75 MG: 75 TABLET, FILM COATED ORAL at 08:11

## 2019-08-19 RX ADMIN — ONDANSETRON 4 MG: 2 INJECTION INTRAMUSCULAR; INTRAVENOUS at 00:02

## 2019-08-19 RX ADMIN — PERFLUTREN 3 ML: 6.52 INJECTION, SUSPENSION INTRAVENOUS at 11:38

## 2019-08-19 RX ADMIN — EPTIFIBATIDE 2 MCG/KG/MIN: 0.75 INJECTION, SOLUTION INTRAVENOUS at 04:52

## 2019-08-19 RX ADMIN — ACETAMINOPHEN 650 MG: 325 TABLET, FILM COATED ORAL at 02:51

## 2019-08-19 RX ADMIN — LOSARTAN POTASSIUM 50 MG: 50 TABLET, FILM COATED ORAL at 08:11

## 2019-08-19 RX ADMIN — ASPIRIN 81 MG: 81 TABLET, COATED ORAL at 08:11

## 2019-08-19 RX ADMIN — ATORVASTATIN CALCIUM 40 MG: 20 TABLET, FILM COATED ORAL at 20:55

## 2019-08-19 RX ADMIN — HYDROCODONE BITARTRATE AND ACETAMINOPHEN 2 TABLET: 5; 325 TABLET ORAL at 11:28

## 2019-08-19 RX ADMIN — ONDANSETRON 4 MG: 2 INJECTION INTRAMUSCULAR; INTRAVENOUS at 04:52

## 2019-08-19 RX ADMIN — ACETAMINOPHEN 650 MG: 325 TABLET, FILM COATED ORAL at 13:26

## 2019-08-19 RX ADMIN — HYDROCODONE BITARTRATE AND ACETAMINOPHEN 2 TABLET: 5; 325 TABLET ORAL at 05:33

## 2019-08-19 RX ADMIN — HYDROCODONE BITARTRATE AND ACETAMINOPHEN 2 TABLET: 5; 325 TABLET ORAL at 17:32

## 2019-08-19 RX ADMIN — MORPHINE SULFATE 1 MG: 2 INJECTION, SOLUTION INTRAMUSCULAR; INTRAVENOUS at 08:11

## 2019-08-19 RX ADMIN — METOPROLOL SUCCINATE 25 MG: 25 TABLET, FILM COATED, EXTENDED RELEASE ORAL at 08:11

## 2019-08-19 NOTE — PROGRESS NOTES
Clinical Pharmacy Services: Medication History    Frank Patel is a 64 y.o. male presenting to [facility] for Ventricular fibrillation (CMS/HCC) [I49.01]  Acute MI, inferoposterior wall (CMS/HCC) [I21.19]  Acute MI, inferoposterior wall (CMS/HCC) [I21.19]    He  has a past medical history of Atrial fibrillation (CMS/HCC), Elevated cholesterol, GERD (gastroesophageal reflux disease), Head trauma (2007), HTN (hypertension), Kidney stone, and Muscle spasm.    Allergies as of 08/18/2019   • (No Known Allergies)       Medication information was obtained from: Patient  Pharmacy and Phone Number: [facility] ([phone]), [facility] ([phone])    Prior to Admission Medications       Prescriptions Last Dose Informant Patient Reported? Taking?    amLODIPine (NORVASC) 2.5 MG tablet   Yes Yes    Take 2.5 mg by mouth Daily.    aspirin 81 MG chewable tablet   Yes Yes    Chew 81 mg Daily.    baclofen (LIORESAL) 10 MG tablet   Yes Yes    Take 10 mg by mouth 3 (Three) Times a Day.    fenofibrate (TRICOR) 145 MG tablet   Yes Yes    Take 145 mg by mouth Daily.    fluticasone (FLONASE) 50 MCG/ACT nasal spray   Yes Yes    2 sprays into the nostril(s) as directed by provider Daily As Needed for Rhinitis.    hydrochlorothiazide (MICROZIDE) 12.5 MG capsule   Yes Yes    Take 25 mg by mouth Daily.    HYDROcodone-acetaminophen (NORCO)  MG per tablet   Yes Yes    Take 1 tablet by mouth Every 6 (Six) Hours As Needed for Moderate Pain .    metoprolol succinate XL (TOPROL-XL) 25 MG 24 hr tablet   Yes Yes    Take 25 mg by mouth Daily.    nabumetone (RELAFEN) 500 MG tablet   Yes Yes    Take 500 mg by mouth 2 (Two) Times a Day. 2 tablets daily    NON FORMULARY  Self Yes Yes    Take 1 tablet by mouth Daily.    NON FORMULARY  Self Yes Yes    Apply 1 each topically to the appropriate area as directed Daily.    pantoprazole (PROTONIX) 40 MG EC tablet   Yes Yes    Take 40 mg by mouth Daily.    tamsulosin (FLOMAX) 0.4 MG  capsule 24 hr capsule   Yes Yes    Take 1 capsule by mouth Every Night.                Medication notes: Added krill oil and androgel 1.62% on medication list as non-formulary items. Both are used once daily.    This medication list is complete to the best of my knowledge as of 8/19/2019    Please call if questions.    Jossie Deng, PharmD Candidate 2020    8/19/2019 2:20 PM

## 2019-08-19 NOTE — PROGRESS NOTES
"Frank Patel  1955 64 y.o.  [id number]      Patient Care Team:  Cam Duran MD as PCP - General (Family Medicine)    CC: Inferior STEMI status post PCI, distal no reflow EF 30%    Interval History: Still a little bit of smoldering discomfort in his chest      Objective   Vital Signs  Temp:  [96.8 °F (36 °C)-98 °F (36.7 °C)] 97.8 °F (36.6 °C)  Heart Rate:  [] 79  Resp:  [14-20] 18  BP: ()/() 110/79    Intake/Output Summary (Last 24 hours) at 8/19/2019 0546  Last data filed at 8/19/2019 0511  Gross per 24 hour   Intake 2841.75 ml   Output 800 ml   Net 2041.75 ml     Flowsheet Rows      First Filed Value   Admission Height  177.8 cm (70\") Documented at 08/18/2019 1328   Admission Weight  113 kg (249 lb) Documented at 08/18/2019 1352          Physical Exam:   General Appearance:    Alert,oriented, in no acute distress   Lungs:     Clear to auscultation,BS are equal    Heart:    Normal S1 and S2, iRRR without murmur, gallop or rub   HEENT:    Sclerae are clear, no JVD or adenopathy   Abdomen:     Normal bowel sounds, soft non-tender, non-distended, no HSM   Extremities:   Moves all extremities well, no edema, no cyanosis, no             Redness, no rash     Medication Review:        aspirin 81 mg Oral Daily   aspirin 325 mg Oral Once   atorvastatin 40 mg Oral Nightly   clopidogrel 75 mg Oral Daily   Eptifibatide 180 mcg/kg Intravenous Once   metoprolol tartrate 25 mg Oral Q12H       eptifibatide 2 mcg/kg/min Last Rate: 2 mcg/kg/min (08/19/19 0452)   lidocaine cardiac 1 mg/min Last Rate: Stopped (08/19/19 0541)   nitroglycerin 5-200 mcg/min Last Rate: 15 mcg/min (08/19/19 0541)         I reviewed the patient's new clinical results.  I personally viewed and interpreted the patient's EKG/Telemetry data    Assessment/Plan  Active Hospital Problems    Diagnosis  POA   • Acute MI, inferoposterior wall (CMS/HCC) [I21.19]  Unknown     Priority: High   • Ventricular fibrillation (CMS/HCC) [I49.01]  " Unknown     Priority: High      Resolved Hospital Problems   No resolved problems to display.       Inferior STEMI treated with PCI complicated by distal no reflow he also had ventricular fibrillation done in the ER was shocked once ECG looks better but still has a little bit of ST elevation.  Will complete the Integrilin infusion continue routine MI PCI care we will watch him in the ICU 1 more day.  We will stop his lidocaine and nitroglycerin    Rod Goems MD  08/19/19  5:46 AM

## 2019-08-19 NOTE — PLAN OF CARE
Problem: Patient Care Overview  Goal: Plan of Care Review  Outcome: Ongoing (interventions implemented as appropriate)  Emesis noted x2.  zofran given.  No chest pain currently.  Only chronic aches and pains. Nitro being weaned off.  Mag replaced.    08/19/19 0555   Coping/Psychosocial   Plan of Care Reviewed With patient   Plan of Care Review   Progress improving       Problem: Pain, Chronic (Adult)  Goal: Acceptable Pain/Comfort Level and Functional Ability  Outcome: Ongoing (interventions implemented as appropriate)      Problem: Fall Risk (Adult)  Goal: Identify Related Risk Factors and Signs and Symptoms  Outcome: Outcome(s) achieved Date Met: 08/19/19    Goal: Absence of Fall  Outcome: Ongoing (interventions implemented as appropriate)      Problem: Skin Injury Risk (Adult)  Goal: Identify Related Risk Factors and Signs and Symptoms  Outcome: Outcome(s) achieved Date Met: 08/19/19    Goal: Skin Health and Integrity  Outcome: Ongoing (interventions implemented as appropriate)      Problem: Cardiac: ACS (Acute Coronary Syndrome) (Adult)  Goal: Signs and Symptoms of Listed Potential Problems Will be Absent, Minimized or Managed (Cardiac: ACS)  Outcome: Ongoing (interventions implemented as appropriate)      Problem: Pain, Acute (Adult)  Goal: Identify Related Risk Factors and Signs and Symptoms  Outcome: Ongoing (interventions implemented as appropriate)    Goal: Acceptable Pain Control/Comfort Level  Outcome: Ongoing (interventions implemented as appropriate)

## 2019-08-19 NOTE — PROGRESS NOTES
Discharge Planning Assessment  [facility]     Patient Name: Frank Patel  MRN: [id number]  Today's Date: 8/19/2019    Admit Date: 8/18/2019    Discharge Needs Assessment     Row Name 08/19/19 1402       Living Environment    Lives With  spouse    Current Living Arrangements  home/apartment/condo    Primary Care Provided by  self;spouse/significant other    Provides Primary Care For  no one    Family Caregiver if Needed  spouse    Quality of Family Relationships  supportive       Resource/Environmental Concerns    Resource/Environmental Concerns  none    Transportation Concerns  car, none       Transition Planning    Patient/Family Anticipates Transition to  home with family    Patient/Family Anticipated Services at Transition  none    Transportation Anticipated  family or friend will provide       Discharge Needs Assessment    Concerns to be Addressed  no discharge needs identified    Equipment Currently Used at Home  none    Anticipated Changes Related to Illness  none    Equipment Needed After Discharge  none        Discharge Plan     Row Name 08/19/19 1403       Plan    Plan  Home no needs    Plan Comments  CCP spoke to patient and and his wife Elvira, [phone], at bedside to discuss discharge planning.  CCP role explained.  Face sheet verified.  His wife is his emergency contact.  His PCP is Dr Cam Duran.   Pt lives in a house with his wife.  He uses no DME to ambulate.  He has no history of Home Health.  He has no physical rehab history.  He is independent with ADL's.  He obtains his medications from [facility] pharmacy in [facility]. Declines referrals.  .  Plan is home with no needs   CCP following        Destination      No service coordination in this encounter.      Durable Medical Equipment      No service coordination in this encounter.      Dialysis/Infusion      No service coordination in this encounter.      Home Medical Care      No service coordination in this encounter.      Therapy       No service coordination in this encounter.      Community Resources      No service coordination in this encounter.          Demographic Summary    No documentation.       Functional Status    No documentation.       Psychosocial    No documentation.       Abuse/Neglect    No documentation.       Legal    No documentation.       Substance Abuse    No documentation.       Patient Forms    No documentation.           Lachelle Baig RN

## 2019-08-20 ENCOUNTER — APPOINTMENT (OUTPATIENT)
Dept: ULTRASOUND IMAGING | Facility: HOSPITAL | Age: 64
End: 2019-08-20

## 2019-08-20 LAB
ANION GAP SERPL CALCULATED.3IONS-SCNC: 13.9 MMOL/L (ref 5–15)
BILIRUB UR QL STRIP: NEGATIVE
BUN BLD-MCNC: 32 MG/DL (ref 8–23)
BUN/CREAT SERPL: 13.1 (ref 7–25)
CALCIUM SPEC-SCNC: 9.4 MG/DL (ref 8.6–10.5)
CHLORIDE SERPL-SCNC: 90 MMOL/L (ref 98–107)
CLARITY UR: CLEAR
CO2 SERPL-SCNC: 27.1 MMOL/L (ref 22–29)
COLOR UR: ABNORMAL
CREAT BLD-MCNC: 2.44 MG/DL (ref 0.76–1.27)
DEPRECATED RDW RBC AUTO: 53.9 FL (ref 37–54)
ERYTHROCYTE [DISTWIDTH] IN BLOOD BY AUTOMATED COUNT: 15.5 % (ref 12.3–15.4)
GFR SERPL CREATININE-BSD FRML MDRD: 27 ML/MIN/1.73
GLUCOSE BLD-MCNC: 167 MG/DL (ref 65–99)
GLUCOSE BLDC GLUCOMTR-MCNC: 155 MG/DL (ref 70–130)
GLUCOSE BLDC GLUCOMTR-MCNC: 166 MG/DL (ref 70–130)
GLUCOSE UR STRIP-MCNC: NEGATIVE MG/DL
HCT VFR BLD AUTO: 45 % (ref 37.5–51)
HGB BLD-MCNC: 14.4 G/DL (ref 13–17.7)
HGB UR QL STRIP.AUTO: NEGATIVE
KETONES UR QL STRIP: ABNORMAL
LEUKOCYTE ESTERASE UR QL STRIP.AUTO: NEGATIVE
MCH RBC QN AUTO: 30.4 PG (ref 26.6–33)
MCHC RBC AUTO-ENTMCNC: 32 G/DL (ref 31.5–35.7)
MCV RBC AUTO: 95.1 FL (ref 79–97)
NITRITE UR QL STRIP: NEGATIVE
PH UR STRIP.AUTO: <=5 [PH] (ref 5–8)
PLATELET # BLD AUTO: 346 10*3/MM3 (ref 140–450)
PMV BLD AUTO: 12 FL (ref 6–12)
POTASSIUM BLD-SCNC: 4.5 MMOL/L (ref 3.5–5.2)
PROT UR QL STRIP: NEGATIVE
RBC # BLD AUTO: 4.73 10*6/MM3 (ref 4.14–5.8)
SODIUM BLD-SCNC: 131 MMOL/L (ref 136–145)
SP GR UR STRIP: >=1.03 (ref 1–1.03)
UROBILINOGEN UR QL STRIP: ABNORMAL
WBC NRBC COR # BLD: 21.31 10*3/MM3 (ref 3.4–10.8)

## 2019-08-20 PROCEDURE — 76775 US EXAM ABDO BACK WALL LIM: CPT

## 2019-08-20 PROCEDURE — 81003 URINALYSIS AUTO W/O SCOPE: CPT | Performed by: INTERNAL MEDICINE

## 2019-08-20 PROCEDURE — 99232 SBSQ HOSP IP/OBS MODERATE 35: CPT | Performed by: INTERNAL MEDICINE

## 2019-08-20 PROCEDURE — 93010 ELECTROCARDIOGRAM REPORT: CPT | Performed by: INTERNAL MEDICINE

## 2019-08-20 PROCEDURE — 93005 ELECTROCARDIOGRAM TRACING: CPT | Performed by: INTERNAL MEDICINE

## 2019-08-20 PROCEDURE — 80048 BASIC METABOLIC PNL TOTAL CA: CPT | Performed by: INTERNAL MEDICINE

## 2019-08-20 PROCEDURE — 82962 GLUCOSE BLOOD TEST: CPT

## 2019-08-20 PROCEDURE — 85027 COMPLETE CBC AUTOMATED: CPT | Performed by: INTERNAL MEDICINE

## 2019-08-20 RX ORDER — PANTOPRAZOLE SODIUM 40 MG/1
40 TABLET, DELAYED RELEASE ORAL EVERY MORNING
Status: DISCONTINUED | OUTPATIENT
Start: 2019-08-20 | End: 2019-08-23 | Stop reason: HOSPADM

## 2019-08-20 RX ORDER — SODIUM CHLORIDE 9 MG/ML
125 INJECTION, SOLUTION INTRAVENOUS CONTINUOUS
Status: ACTIVE | OUTPATIENT
Start: 2019-08-20 | End: 2019-08-20

## 2019-08-20 RX ADMIN — ASPIRIN 81 MG: 81 TABLET, COATED ORAL at 08:21

## 2019-08-20 RX ADMIN — HYDROCODONE BITARTRATE AND ACETAMINOPHEN 2 TABLET: 5; 325 TABLET ORAL at 08:28

## 2019-08-20 RX ADMIN — METOPROLOL SUCCINATE 25 MG: 25 TABLET, FILM COATED, EXTENDED RELEASE ORAL at 08:21

## 2019-08-20 RX ADMIN — HYDROCODONE BITARTRATE AND ACETAMINOPHEN 2 TABLET: 5; 325 TABLET ORAL at 14:12

## 2019-08-20 RX ADMIN — SODIUM CHLORIDE 125 ML/HR: 9 INJECTION, SOLUTION INTRAVENOUS at 10:14

## 2019-08-20 RX ADMIN — PANTOPRAZOLE SODIUM 40 MG: 40 TABLET, DELAYED RELEASE ORAL at 12:08

## 2019-08-20 RX ADMIN — ATORVASTATIN CALCIUM 40 MG: 20 TABLET, FILM COATED ORAL at 20:04

## 2019-08-20 RX ADMIN — CLOPIDOGREL 75 MG: 75 TABLET, FILM COATED ORAL at 08:21

## 2019-08-20 RX ADMIN — SODIUM CHLORIDE 125 ML/HR: 9 INJECTION, SOLUTION INTRAVENOUS at 17:37

## 2019-08-20 RX ADMIN — HYDROCODONE BITARTRATE AND ACETAMINOPHEN 2 TABLET: 5; 325 TABLET ORAL at 22:36

## 2019-08-20 NOTE — PROGRESS NOTES
"Frank Patel  1955 64 y.o.  [id number]      Patient Care Team:  Cam Duran MD as PCP - General (Family Medicine)    CC: Inferior STEMI status post PCI, distal no reflow EF 30%    Interval History: He is doing okay no chest pain or shortness of breath      Objective   Vital Signs  Temp:  [97.4 °F (36.3 °C)-99 °F (37.2 °C)] 98.1 °F (36.7 °C)  Heart Rate:  [53-78] 61  Resp:  [18] 18  BP: ()/(51-87) 96/80    Intake/Output Summary (Last 24 hours) at 8/20/2019 0941  Last data filed at 8/20/2019 0438  Gross per 24 hour   Intake 720 ml   Output 250 ml   Net 470 ml     Flowsheet Rows      First Filed Value   Admission Height  177.8 cm (70\") Documented at 08/18/2019 1328   Admission Weight  113 kg (249 lb) Documented at 08/18/2019 1352          Physical Exam:   General Appearance:    Alert,oriented, in no acute distress   Lungs:     Clear to auscultation,BS are equal    Heart:    Normal S1 and S2, iRRR without murmur, gallop or rub   HEENT:    Sclerae are clear, no JVD or adenopathy   Abdomen:     Normal bowel sounds, soft non-tender, non-distended, no HSM   Extremities:   Moves all extremities well, no edema, no cyanosis, no             Redness, no rash     Medication Review:        aspirin 81 mg Oral Daily   atorvastatin 40 mg Oral Nightly   clopidogrel 75 mg Oral Daily   Eptifibatide 180 mcg/kg Intravenous Once   metoprolol succinate XL 25 mg Oral Q24H   metoprolol tartrate 5 mg Intravenous Once       sodium chloride 125 mL/hr         I reviewed the patient's new clinical results.  I personally viewed and interpreted the patient's EKG/Telemetry data    Assessment/Plan  Active Hospital Problems    Diagnosis  POA   • Acute MI, inferoposterior wall (CMS/HCC) [I21.19]  Unknown     Priority: High   • Ventricular fibrillation (CMS/HCC) [I49.01]  Unknown     Priority: High      Resolved Hospital Problems   No resolved problems to display.       Inferior STEMI treated with PCI complicated by distal no reflow " he also had ventricular fibrillation done in the ER was shocked once ECG looks better but still has a little bit of ST elevation.  Pretty reduced LV function on his echo also.  The most pressing issue is that he is developed acute renal insufficiency.  I stopped his losartan and have started some IV fluid    Rod Gomes MD  08/20/19  9:41 AM

## 2019-08-20 NOTE — PLAN OF CARE
Problem: Patient Care Overview  Goal: Plan of Care Review  Outcome: Ongoing (interventions implemented as appropriate)   08/20/19 0550   Coping/Psychosocial   Plan of Care Reviewed With patient   OTHER   Outcome Summary patient remains in ccu overnight. Ambulated to recliner at start of shift and gave the patient a bath. Saline locked at this time. No intake or output charted for dayshift yesterday. Encouraged PO intake this shift after patient stated he had decreased intake during the day. 250mL of urine output by 0500. Encouraged more PO intake. HR 48-65 (unchanged, per dayshift RN). BP MAP good all shift. systolic above 90 all shift. Unchanged from dayshift as well. Otherwise stable.    Plan of Care Review   Progress no change       Problem: Pain, Chronic (Adult)  Goal: Acceptable Pain/Comfort Level and Functional Ability  Outcome: Ongoing (interventions implemented as appropriate)      Problem: Fall Risk (Adult)  Goal: Absence of Fall  Outcome: Ongoing (interventions implemented as appropriate)      Problem: Skin Injury Risk (Adult)  Goal: Skin Health and Integrity  Outcome: Ongoing (interventions implemented as appropriate)      Problem: Cardiac: ACS (Acute Coronary Syndrome) (Adult)  Goal: Signs and Symptoms of Listed Potential Problems Will be Absent, Minimized or Managed (Cardiac: ACS)  Outcome: Ongoing (interventions implemented as appropriate)      Problem: Pain, Acute (Adult)  Goal: Identify Related Risk Factors and Signs and Symptoms  Outcome: Outcome(s) achieved Date Met: 08/20/19    Goal: Acceptable Pain Control/Comfort Level  Outcome: Ongoing (interventions implemented as appropriate)

## 2019-08-20 NOTE — CONSULTS
"  Referring Provider: Dr Gomes  Reason for Consultation: KLAUDIA     Subjective     Chief complaint   Chief Complaint   Patient presents with   • Back Pain       History of present illness:  63 yo WM with h/o HTN, nephrolithiasis (remote), BPH, GERD, AFIB who presented on 8/18/19 with chest pain & dyspnea, felt \"like a balloon in my upper back.\"  He was found to have acute inferior STEMI and underwent emergent LHC, angioplasty & stent that day.  LVgram showed EF 35%.  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  Cr was 1.4 initially, 1.3 on 8/19 (day 1 post cath) and jumped to 2.4 today.  BP has been low, down to 85/57 yesterday with several systolic readings in 90s.  Denies n/v/d.  UOP has been poor, voided small amt earlier and random bladder scan just now showed 75 cc/hr.  Pt aware of renal insuff in past but never saw nephrologist, states PCP asked him to reduce NSAID use, stopped ibuprofen but still takes relafen.  Home meds notable also for flomax, PPI, HCTZ.  Denies dyspnea or chest pain currently, no swelling.  Initial CXR shows mildly prominent vascular markings but no pulm edema or effusions.  NS IVF has been ordered to start.    Past Medical History:   Diagnosis Date   • Atrial fibrillation (CMS/HCC)    • Elevated cholesterol    • GERD (gastroesophageal reflux disease)    • Head trauma 2007   • HTN (hypertension)    • Kidney stone    • Muscle spasm      Past Surgical History:   Procedure Laterality Date   • APPENDECTOMY     • CARDIAC CATHETERIZATION N/A 8/18/2019    Procedure: Left Heart Cath;  Surgeon: Rod Gomes MD;  Location: [facility] INVASIVE LOCATION;  Service: Cardiology   • CARDIAC CATHETERIZATION N/A 8/18/2019    Procedure: Stent ROBY coronary;  Surgeon: Rod Gomes MD;  Location: [facility] CATH INVASIVE LOCATION;  Service: Cardiology   • CARDIAC CATHETERIZATION N/A 8/18/2019    Procedure: Coronary angiography;  Surgeon: Rod Gomes MD;  Location: " "[facility] CATH INVASIVE LOCATION;  Service: Cardiology   • CARDIAC CATHETERIZATION N/A 2019    Procedure: Left ventriculography;  Surgeon: Rod Gomes MD;  Location: [facility] INVASIVE LOCATION;  Service: Cardiology   • FEMUR FRACTURE SURGERY Right    • HAND SURGERY Right    • SPLENECTOMY       No family history on file.    Social History     Tobacco Use   • Smoking status: Former Smoker     Types: Cigarettes     Last attempt to quit: 1999     Years since quittin.0   • Smokeless tobacco: Never Used   Substance Use Topics   • Alcohol use: Yes     Alcohol/week: 1.8 oz     Types: 3 Cans of beer per week     Comment: \"2-3 drinks\"    • Drug use: Defer     Medications Prior to Admission   Medication Sig Dispense Refill Last Dose   • amLODIPine (NORVASC) 2.5 MG tablet Take 2.5 mg by mouth Daily.      • aspirin 81 MG chewable tablet Chew 81 mg Daily.      • baclofen (LIORESAL) 10 MG tablet Take 10 mg by mouth 3 (Three) Times a Day.      • fenofibrate (TRICOR) 145 MG tablet Take 145 mg by mouth Daily.      • fluticasone (FLONASE) 50 MCG/ACT nasal spray 2 sprays into the nostril(s) as directed by provider Daily As Needed for Rhinitis.      • hydrochlorothiazide (MICROZIDE) 12.5 MG capsule Take 25 mg by mouth Daily.      • HYDROcodone-acetaminophen (NORCO)  MG per tablet Take 1 tablet by mouth Every 6 (Six) Hours As Needed for Moderate Pain .      • metoprolol succinate XL (TOPROL-XL) 25 MG 24 hr tablet Take 25 mg by mouth Daily.      • nabumetone (RELAFEN) 500 MG tablet Take 500 mg by mouth 2 (Two) Times a Day. 2 tablets daily      • NON FORMULARY Take 1 tablet by mouth Daily.      • NON FORMULARY Apply 1 each topically to the appropriate area as directed Daily.      • pantoprazole (PROTONIX) 40 MG EC tablet Take 40 mg by mouth Daily.      • tamsulosin (FLOMAX) 0.4 MG capsule 24 hr capsule Take 1 capsule by mouth Every Night.        Allergies:  Patient has no known allergies.    Review of " "Systems  Pertinent items are noted in HPI.    Objective     Vital Signs  Temp:  [97.4 °F (36.3 °C)-99 °F (37.2 °C)] 98.1 °F (36.7 °C)  Heart Rate:  [53-78] 69  Resp:  [18] 18  BP: ()/(51-87) 105/65    Flowsheet Rows      First Filed Value   Admission Height  177.8 cm (70\") Documented at 08/18/2019 1328   Admission Weight  113 kg (249 lb) Documented at 08/18/2019 1352           No intake/output data recorded.  I/O last 3 completed shifts:  In: 2749 [P.O.:2200; I.V.:549]  Out: 675 [Urine:475; Emesis/NG output:200]    Intake/Output Summary (Last 24 hours) at 8/20/2019 1009  Last data filed at 8/20/2019 0438  Gross per 24 hour   Intake 720 ml   Output 250 ml   Net 470 ml       Physical Exam:  GEN pleasant middle age WM in no distress, comfortable, alert  HEENT OP clear, MMM  Neck supple no JVD  Lungs CTA bilat no rales  CV RRR no M/G  abd soft NT/ND +BS   no suprapubic fullness  Vasc no pedal or ankle edema   MS no joint warmth or erythema  Derm normal turgor no rash  Neuro oriented x3, speech intact       Results Review:  Results for orders placed or performed during the hospital encounter of 08/18/19   Comprehensive Metabolic Panel   Result Value Ref Range    Glucose 223 (H) 65 - 99 mg/dL    BUN 12 8 - 23 mg/dL    Creatinine 1.40 (H) 0.76 - 1.27 mg/dL    Sodium 139 136 - 145 mmol/L    Potassium 3.4 (L) 3.5 - 5.2 mmol/L    Chloride 95 (L) 98 - 107 mmol/L    CO2 26.7 22.0 - 29.0 mmol/L    Calcium 9.7 8.6 - 10.5 mg/dL    Total Protein 7.9 6.0 - 8.5 g/dL    Albumin 4.90 3.50 - 5.20 g/dL    ALT (SGPT) 29 1 - 41 U/L    AST (SGOT) 35 1 - 40 U/L    Alkaline Phosphatase 70 39 - 117 U/L    Total Bilirubin 0.4 0.2 - 1.2 mg/dL    eGFR Non African Amer 51 (L) >60 mL/min/1.73    Globulin 3.0 gm/dL    A/G Ratio 1.6 g/dL    BUN/Creatinine Ratio 8.6 7.0 - 25.0    Anion Gap 17.3 (H) 5.0 - 15.0 mmol/L   Troponin   Result Value Ref Range    Troponin T <0.010 0.000 - 0.030 ng/mL   Troponin   Result Value Ref Range    Troponin T " 2.920 (C) 0.000 - 0.030 ng/mL   CBC Auto Differential   Result Value Ref Range    WBC 16.30 (H) 3.40 - 10.80 10*3/mm3    RBC 5.30 4.14 - 5.80 10*6/mm3    Hemoglobin 15.9 13.0 - 17.7 g/dL    Hematocrit 50.0 37.5 - 51.0 %    MCV 94.3 79.0 - 97.0 fL    MCH 30.0 26.6 - 33.0 pg    MCHC 31.8 31.5 - 35.7 g/dL    RDW 15.3 12.3 - 15.4 %    RDW-SD 53.2 37.0 - 54.0 fl    MPV 11.8 6.0 - 12.0 fL    Platelets 450 140 - 450 10*3/mm3    Neutrophil % 62.4 42.7 - 76.0 %    Lymphocyte % 27.6 19.6 - 45.3 %    Monocyte % 5.1 5.0 - 12.0 %    Eosinophil % 3.4 0.3 - 6.2 %    Basophil % 1.0 0.0 - 1.5 %    Immature Grans % 0.5 0.0 - 0.5 %    Neutrophils, Absolute 10.16 (H) 1.70 - 7.00 10*3/mm3    Lymphocytes, Absolute 4.50 (H) 0.70 - 3.10 10*3/mm3    Monocytes, Absolute 0.83 0.10 - 0.90 10*3/mm3    Eosinophils, Absolute 0.56 (H) 0.00 - 0.40 10*3/mm3    Basophils, Absolute 0.17 0.00 - 0.20 10*3/mm3    Immature Grans, Absolute 0.08 (H) 0.00 - 0.05 10*3/mm3    nRBC 0.1 0.0 - 0.2 /100 WBC   Protime-INR   Result Value Ref Range    Protime 11.9 11.7 - 14.2 Seconds    INR 0.91 0.90 - 1.10   Potassium   Result Value Ref Range    Potassium 4.8 3.5 - 5.2 mmol/L   Magnesium   Result Value Ref Range    Magnesium 1.6 1.6 - 2.4 mg/dL   CBC (No Diff)   Result Value Ref Range    WBC 17.11 (H) 3.40 - 10.80 10*3/mm3    RBC 5.03 4.14 - 5.80 10*6/mm3    Hemoglobin 14.8 13.0 - 17.7 g/dL    Hematocrit 47.0 37.5 - 51.0 %    MCV 93.4 79.0 - 97.0 fL    MCH 29.4 26.6 - 33.0 pg    MCHC 31.5 31.5 - 35.7 g/dL    RDW 15.9 (H) 12.3 - 15.4 %    RDW-SD 53.6 37.0 - 54.0 fl    MPV 12.3 (H) 6.0 - 12.0 fL    Platelets 403 140 - 450 10*3/mm3   Basic Metabolic Panel   Result Value Ref Range    Glucose 202 (H) 65 - 99 mg/dL    BUN 15 8 - 23 mg/dL    Creatinine 1.34 (H) 0.76 - 1.27 mg/dL    Sodium 135 (L) 136 - 145 mmol/L    Potassium 4.3 3.5 - 5.2 mmol/L    Chloride 96 (L) 98 - 107 mmol/L    CO2 21.7 (L) 22.0 - 29.0 mmol/L    Calcium 9.3 8.6 - 10.5 mg/dL    eGFR Non African Amer  54 (L) >60 mL/min/1.73    BUN/Creatinine Ratio 11.2 7.0 - 25.0    Anion Gap 17.3 (H) 5.0 - 15.0 mmol/L   Hemoglobin A1c   Result Value Ref Range    Hemoglobin A1C 6.50 (H) 4.80 - 5.60 %   Lipid Panel   Result Value Ref Range    Total Cholesterol 186 0 - 200 mg/dL    Triglycerides 393 (H) 0 - 150 mg/dL    HDL Cholesterol 31 (L) 40 - 60 mg/dL    LDL Cholesterol  76 0 - 100 mg/dL    VLDL Cholesterol 78.6 (H) 5 - 40 mg/dL    LDL/HDL Ratio 2.46    Troponin   Result Value Ref Range    Troponin T 7.140 (C) 0.000 - 0.030 ng/mL   Basic Metabolic Panel   Result Value Ref Range    Glucose 167 (H) 65 - 99 mg/dL    BUN 32 (H) 8 - 23 mg/dL    Creatinine 2.44 (H) 0.76 - 1.27 mg/dL    Sodium 131 (L) 136 - 145 mmol/L    Potassium 4.5 3.5 - 5.2 mmol/L    Chloride 90 (L) 98 - 107 mmol/L    CO2 27.1 22.0 - 29.0 mmol/L    Calcium 9.4 8.6 - 10.5 mg/dL    eGFR Non African Amer 27 (L) >60 mL/min/1.73    BUN/Creatinine Ratio 13.1 7.0 - 25.0    Anion Gap 13.9 5.0 - 15.0 mmol/L   CBC (No Diff)   Result Value Ref Range    WBC 21.31 (H) 3.40 - 10.80 10*3/mm3    RBC 4.73 4.14 - 5.80 10*6/mm3    Hemoglobin 14.4 13.0 - 17.7 g/dL    Hematocrit 45.0 37.5 - 51.0 %    MCV 95.1 79.0 - 97.0 fL    MCH 30.4 26.6 - 33.0 pg    MCHC 32.0 31.5 - 35.7 g/dL    RDW 15.5 (H) 12.3 - 15.4 %    RDW-SD 53.9 37.0 - 54.0 fl    MPV 12.0 6.0 - 12.0 fL    Platelets 346 140 - 450 10*3/mm3   POC Glucose Once   Result Value Ref Range    Glucose 224 (H) 70 - 130 mg/dL   POC Glucose Once   Result Value Ref Range    Glucose 187 (H) 70 - 130 mg/dL   POC Activated Clotting Time   Result Value Ref Range    Activated Clotting Time  131 82 - 152 Seconds   POC Activated Clotting Time   Result Value Ref Range    Activated Clotting Time  252 (H) 82 - 152 Seconds   POC Activated Clotting Time   Result Value Ref Range    Activated Clotting Time  224 (H) 82 - 152 Seconds   POC Glucose Once   Result Value Ref Range    Glucose 158 (H) 70 - 130 mg/dL   POC Glucose Once   Result Value Ref  Range    Glucose 185 (H) 70 - 130 mg/dL   POC Glucose Once   Result Value Ref Range    Glucose 176 (H) 70 - 130 mg/dL   POC Glucose Once   Result Value Ref Range    Glucose 155 (H) 70 - 130 mg/dL   POC Glucose Once   Result Value Ref Range    Glucose 166 (H) 70 - 130 mg/dL   Adult Transthoracic Echo Complete W/ Cont if Necessary Per Protocol   Result Value Ref Range    BSA 2.2 m^2    IVSd 1.6 cm    LVIDd 4.5 cm    LVIDs 3.9 cm    LVPWd 1.6 cm    IVS/LVPW 1.0     FS 13.3 %    EDV(Teich) 92.4 ml    ESV(Teich) 65.9 ml    EF(Teich) 28.7 %    EDV(cubed) 91.1 ml    ESV(cubed) 59.3 ml    EF(cubed) 34.9 %    LV mass(C)d 304.6 grams    LV mass(C)dI 136.0 grams/m^2    SV(Teich) 26.5 ml    SI(Teich) 11.8 ml/m^2    SV(cubed) 31.8 ml    SI(cubed) 14.2 ml/m^2    Ao root diam 3.1 cm    Ao root area 7.5 cm^2    ACS 2.2 cm    LA dimension 4.4 cm    LA/Ao 1.4     LVOT diam 2.0 cm    LVOT area 3.1 cm^2    LVOT area(traced) 3.1 cm^2    RVOT diam 2.5 cm    RVOT area 4.9 cm^2    LVLd ap4 8.3 cm    EDV(MOD-sp4) 115.0 ml    LVLs ap4 7.3 cm    ESV(MOD-sp4) 72.0 ml    EF(MOD-sp4) 37.4 %    LVLd ap2 7.3 cm    EDV(MOD-sp2) 106.0 ml    LVLs ap2 7.2 cm    ESV(MOD-sp2) 80.0 ml    EF(MOD-sp2) 24.5 %    SV(MOD-sp4) 43.0 ml    SI(MOD-sp4) 19.2 ml/m^2    SV(MOD-sp2) 26.0 ml    SI(MOD-sp2) 11.6 ml/m^2    Ao root area (BSA corrected) 1.4     LV Saucedo Vol (BSA corrected) 51.4 ml/m^2    LV Sys Vol (BSA corrected) 32.2 ml/m^2    MV E max dominic 67.1 cm/sec    MV V2 mean 34.9 cm/sec    MV mean PG 1.0 mmHg    MV V2 VTI 23.3 cm    MVA(VTI) 1.4 cm^2    MV P1/2t max dominic 86.9 cm/sec    MV P1/2t 55.2 msec    MVA(P1/2t) 4.0 cm^2    MV dec slope 461.0 cm/sec^2    MV dec time 0.12 sec    Ao V2 mean 53.6 cm/sec    Ao mean PG 1.0 mmHg    Ao mean PG (full) 0 mmHg    Ao V2 VTI 11.9 cm    BALDO(I,A) 2.7 cm^2    BALDO(I,D) 2.7 cm^2    LV V1 mean PG 1.0 mmHg    LV V1 mean 38.6 cm/sec    LV V1 VTI 10.3 cm    SV(Ao) 89.8 ml    SI(Ao) 40.1 ml/m^2    SV(LVOT) 32.4 ml    SV(RVOT) 41.5  ml    SI(LVOT) 14.4 ml/m^2    PA V2 max 48.9 cm/sec    PA max PG 0.96 mmHg    PA max PG (full) 0.17 mmHg     CV ECHO JOSE - PVA(V,A) 4.5 cm^2     CV ECHO JOSE - PVA(V,D) 4.5 cm^2    PA acc slope 716.0 cm/sec^2    PA acc time 0.07 sec    RV V1 max PG 0.79 mmHg    RV V1 mean PG 0 mmHg    RV V1 max 44.4 cm/sec    RV V1 mean 27.9 cm/sec    RV V1 VTI 8.5 cm    PA pr(Accel) 45.7 mmHg    Qp/Qs 1.3     MVA P1/2T LCG 2.5 cm^2     CV ECHO JOSE - BZI_BMI 33.9 kilograms/m^2     CV ECHO JOSE - BSA(HAYCOCK) 2.3 m^2     CV ECHO JOSE - BZI_METRIC_WEIGHT 107.1 kg     CV ECHO JOSE - BZI_METRIC_HEIGHT 177.8 cm    Target HR (85%) 134 bpm    Max. Pred. HR (100%) 158 bpm     CV VAS BP RIGHT /68 mmHg    TDI S' 8.00 cm/sec    RV Base 4.00 cm    LA Volume Index 34.0 mL/m2    Avg E/e' ratio 8.95     EF(MOD-bp) 35.0 %    Lat Peak E' Lázaro 7.0 cm/sec    Med Peak E' Lázaro 8.00 cm/sec    TAPSE (>1.6) 1.00 cm2   Light Blue Top   Result Value Ref Range    Extra Tube hold for add-on    Green Top (Gel)   Result Value Ref Range    Extra Tube Hold for add-ons.    Lavender Top   Result Value Ref Range    Extra Tube hold for add-on    Gold Top - SST   Result Value Ref Range    Extra Tube Hold for add-ons.      Imaging Results (last 72 hours)     Procedure Component Value Units Date/Time    XR Chest 1 View [[id number]] Collected:  08/18/19 1353     Updated:  08/18/19 1357    Narrative:       XR CHEST 1 VW-     HISTORY: Male who is 64 years-old,  acute [facility]     TECHNIQUE: Frontal view of the chest     COMPARISON: None available     FINDINGS: Heart is enlarged. Mild prominence of the vascular markings.  No focal pulmonary consolidation, pleural effusion, or pneumothorax is  seen, limited by supine positioning. No acute osseous process.       Impression:       No evidence for acute pulmonary process. Cardiomegaly with  mild prominence of vascular markings. Follow-up as clinical indications  persist.     This report was finalized on  8/18/2019 1:54 PM by Dr. Wayne Krishnamurthy M.D.                 aspirin 81 mg Oral Daily   atorvastatin 40 mg Oral Nightly   clopidogrel 75 mg Oral Daily   Eptifibatide 180 mcg/kg Intravenous Once   metoprolol succinate XL 25 mg Oral Q24H   metoprolol tartrate 5 mg Intravenous Once       sodium chloride 125 mL/hr       Assessment/Plan   Oliguric KLAUDIA - Cr up 1.3 to 2.4; suspect ATN related to contrast exposure (within 48h of LHC), mild hypotension, and impaired hemodynamic compensation from chronic NSAID use.  Prerenal azotemia less likely.  Doubt obstruction based on random bladder scan.  K/HCo3 stable.  Agree with IVF trial, EF low 35% on LVgram but should tolerate some saline.      CKD stage 2 or 3 - BL Cr 1.3 - 1.6 since early 2018, likely related in large part to longterm NSAID, as well as hypertensive nephrosclerosis  STEMI, s/p LHC/PCI on 8/18/19  LV dysfunction, EF 35%   Hypotension - mild, improved, SBP low 100s this AM (from 80s - 90s)  GERD - PPI been on hold so not contributory to current picture  Dyslipidemia - on statin  BPH - on statin, bladder scan ok 75cc  H/o nephrolithiasis - quiescent for a while but will obtain renal US   Chronic NSAID use - needs to stop these altogether moving forward    Plan  - agree with  cc/hr, will limit duration to 1.5 liters total  - obtain UA & renal US  - will adjust meds based on GFR and monitor for expected plateau in waste products     Thank you Dr Gomes for involving me in pt's care.    - holding HCTZ     Acute MI, inferoposterior wall (CMS/HCC)    Ventricular fibrillation (CMS/HCC)        I discussed the patients findings and my recommendations with patient    Fidel Good MD  08/20/19  10:09 AM      Much of this encounter note is an electronic transcription/translation of spoken language to printed text. The electronic translation of spoken language may permit erroneous, or at times, nonsensical words or phrases to be inadvertently  transcribed; Although I have reviewed the note for such errors, some may still exist

## 2019-08-20 NOTE — PLAN OF CARE
Problem: Patient Care Overview  Goal: Plan of Care Review  Outcome: Ongoing (interventions implemented as appropriate)   08/20/19 3052   Coping/Psychosocial   Plan of Care Reviewed With patient;spouse   OTHER   Outcome Summary Vitals stable. Urine output remains low. Plan of care explained to patient and spouse. Will continue to monitor closely.   Plan of Care Review   Progress improving       Problem: Pain, Chronic (Adult)  Goal: Acceptable Pain/Comfort Level and Functional Ability  Outcome: Ongoing (interventions implemented as appropriate)      Problem: Fall Risk (Adult)  Goal: Absence of Fall  Outcome: Ongoing (interventions implemented as appropriate)      Problem: Cardiac: ACS (Acute Coronary Syndrome) (Adult)  Goal: Signs and Symptoms of Listed Potential Problems Will be Absent, Minimized or Managed (Cardiac: ACS)  Outcome: Ongoing (interventions implemented as appropriate)      Problem: Pain, Acute (Adult)  Goal: Acceptable Pain Control/Comfort Level  Outcome: Ongoing (interventions implemented as appropriate)

## 2019-08-21 LAB
ALBUMIN SERPL-MCNC: 4.1 G/DL (ref 3.5–5.2)
ANION GAP SERPL CALCULATED.3IONS-SCNC: 16.4 MMOL/L (ref 5–15)
ANION GAP SERPL CALCULATED.3IONS-SCNC: 8.9 MMOL/L (ref 5–15)
BASOPHILS # BLD AUTO: 0.1 10*3/MM3 (ref 0–0.2)
BASOPHILS NFR BLD AUTO: 0.6 % (ref 0–1.5)
BUN BLD-MCNC: 42 MG/DL (ref 8–23)
BUN BLD-MCNC: 43 MG/DL (ref 8–23)
BUN/CREAT SERPL: 23.1 (ref 7–25)
BUN/CREAT SERPL: 24 (ref 7–25)
CALCIUM SPEC-SCNC: 8.8 MG/DL (ref 8.6–10.5)
CALCIUM SPEC-SCNC: 8.8 MG/DL (ref 8.6–10.5)
CHLORIDE SERPL-SCNC: 86 MMOL/L (ref 98–107)
CHLORIDE SERPL-SCNC: 89 MMOL/L (ref 98–107)
CO2 SERPL-SCNC: 19.6 MMOL/L (ref 22–29)
CO2 SERPL-SCNC: 26.1 MMOL/L (ref 22–29)
CREAT BLD-MCNC: 1.75 MG/DL (ref 0.76–1.27)
CREAT BLD-MCNC: 1.86 MG/DL (ref 0.76–1.27)
DEPRECATED RDW RBC AUTO: 51.4 FL (ref 37–54)
EOSINOPHIL # BLD AUTO: 0.18 10*3/MM3 (ref 0–0.4)
EOSINOPHIL NFR BLD AUTO: 1 % (ref 0.3–6.2)
ERYTHROCYTE [DISTWIDTH] IN BLOOD BY AUTOMATED COUNT: 14.9 % (ref 12.3–15.4)
GFR SERPL CREATININE-BSD FRML MDRD: 37 ML/MIN/1.73
GFR SERPL CREATININE-BSD FRML MDRD: 40 ML/MIN/1.73
GLUCOSE BLD-MCNC: 148 MG/DL (ref 65–99)
GLUCOSE BLD-MCNC: 156 MG/DL (ref 65–99)
HCT VFR BLD AUTO: 42 % (ref 37.5–51)
HGB BLD-MCNC: 13.5 G/DL (ref 13–17.7)
IMM GRANULOCYTES # BLD AUTO: 0.16 10*3/MM3 (ref 0–0.05)
IMM GRANULOCYTES NFR BLD AUTO: 0.9 % (ref 0–0.5)
LYMPHOCYTES # BLD AUTO: 5.19 10*3/MM3 (ref 0.7–3.1)
LYMPHOCYTES NFR BLD AUTO: 29 % (ref 19.6–45.3)
MCH RBC QN AUTO: 30.1 PG (ref 26.6–33)
MCHC RBC AUTO-ENTMCNC: 32.1 G/DL (ref 31.5–35.7)
MCV RBC AUTO: 93.5 FL (ref 79–97)
MONOCYTES # BLD AUTO: 2.02 10*3/MM3 (ref 0.1–0.9)
MONOCYTES NFR BLD AUTO: 11.3 % (ref 5–12)
NEUTROPHILS # BLD AUTO: 10.24 10*3/MM3 (ref 1.7–7)
NEUTROPHILS NFR BLD AUTO: 57.2 % (ref 42.7–76)
NRBC BLD AUTO-RTO: 0.5 /100 WBC (ref 0–0.2)
PHOSPHATE SERPL-MCNC: 2.6 MG/DL (ref 2.5–4.5)
PLATELET # BLD AUTO: 284 10*3/MM3 (ref 140–450)
PMV BLD AUTO: 12.5 FL (ref 6–12)
POTASSIUM BLD-SCNC: 3.8 MMOL/L (ref 3.5–5.2)
POTASSIUM BLD-SCNC: 4 MMOL/L (ref 3.5–5.2)
RBC # BLD AUTO: 4.49 10*6/MM3 (ref 4.14–5.8)
SODIUM BLD-SCNC: 122 MMOL/L (ref 136–145)
SODIUM BLD-SCNC: 124 MMOL/L (ref 136–145)
URATE SERPL-MCNC: 11.3 MG/DL (ref 3.4–7)
WBC NRBC COR # BLD: 17.89 10*3/MM3 (ref 3.4–10.8)

## 2019-08-21 PROCEDURE — 99232 SBSQ HOSP IP/OBS MODERATE 35: CPT | Performed by: INTERNAL MEDICINE

## 2019-08-21 PROCEDURE — 85025 COMPLETE CBC W/AUTO DIFF WBC: CPT | Performed by: INTERNAL MEDICINE

## 2019-08-21 PROCEDURE — 84550 ASSAY OF BLOOD/URIC ACID: CPT | Performed by: INTERNAL MEDICINE

## 2019-08-21 PROCEDURE — 80069 RENAL FUNCTION PANEL: CPT | Performed by: INTERNAL MEDICINE

## 2019-08-21 PROCEDURE — 80048 BASIC METABOLIC PNL TOTAL CA: CPT | Performed by: INTERNAL MEDICINE

## 2019-08-21 RX ADMIN — ASPIRIN 81 MG: 81 TABLET, COATED ORAL at 08:57

## 2019-08-21 RX ADMIN — ATORVASTATIN CALCIUM 40 MG: 20 TABLET, FILM COATED ORAL at 19:35

## 2019-08-21 RX ADMIN — HYDROCODONE BITARTRATE AND ACETAMINOPHEN 2 TABLET: 5; 325 TABLET ORAL at 18:31

## 2019-08-21 RX ADMIN — CLOPIDOGREL 75 MG: 75 TABLET, FILM COATED ORAL at 08:57

## 2019-08-21 RX ADMIN — HYDROCODONE BITARTRATE AND ACETAMINOPHEN 2 TABLET: 5; 325 TABLET ORAL at 12:34

## 2019-08-21 RX ADMIN — METOPROLOL SUCCINATE 25 MG: 25 TABLET, FILM COATED, EXTENDED RELEASE ORAL at 08:57

## 2019-08-21 RX ADMIN — PANTOPRAZOLE SODIUM 40 MG: 40 TABLET, DELAYED RELEASE ORAL at 07:06

## 2019-08-21 RX ADMIN — HYDROCODONE BITARTRATE AND ACETAMINOPHEN 2 TABLET: 5; 325 TABLET ORAL at 05:27

## 2019-08-21 NOTE — PROGRESS NOTES
"Frank Patel  1955 64 y.o.  [id number]      Patient Care Team:  Cam Duran MD as PCP - General (Family Medicine)    CC: Inferior STEMI status post PCI, distal no reflow EF 30%    Interval History: He is doing well making urine      Objective   Vital Signs  Temp:  [97.4 °F (36.3 °C)-98.1 °F (36.7 °C)] 98 °F (36.7 °C)  Heart Rate:  [49-76] 57  Resp:  [16-18] 16  BP: ()/(63-81) 101/71    Intake/Output Summary (Last 24 hours) at 8/21/2019 1338  Last data filed at 8/21/2019 0800  Gross per 24 hour   Intake 2676 ml   Output 575 ml   Net 2101 ml     Flowsheet Rows      First Filed Value   Admission Height  177.8 cm (70\") Documented at 08/18/2019 1328   Admission Weight  113 kg (249 lb) Documented at 08/18/2019 1352          Physical Exam:   General Appearance:    Alert,oriented, in no acute distress   Lungs:     Clear to auscultation,BS are equal    Heart:    Normal S1 and S2, iRRR without murmur, gallop or rub   HEENT:    Sclerae are clear, no JVD or adenopathy   Abdomen:     Normal bowel sounds, soft non-tender, non-distended, no HSM   Extremities:   Moves all extremities well, no edema, no cyanosis, no             Redness, no rash     Medication Review:        aspirin 81 mg Oral Daily   atorvastatin 40 mg Oral Nightly   clopidogrel 75 mg Oral Daily   metoprolol succinate XL 25 mg Oral Q24H   pantoprazole 40 mg Oral QAM            I reviewed the patient's new clinical results.  I personally viewed and interpreted the patient's EKG/Telemetry data    Assessment/Plan  Active Hospital Problems    Diagnosis  POA   • Acute MI, inferoposterior wall (CMS/HCC) [I21.19]  Unknown     Priority: High   • Ventricular fibrillation (CMS/HCC) [I49.01]  Unknown     Priority: High      Resolved Hospital Problems   No resolved problems to display.       Inferior STEMI treated with PCI complicated by distal no reflow he also had ventricular fibrillation done in the ER was shocked once ECG looks better but still has a " little bit of ST elevation.  Pretty reduced LV function on his echo also.  Renal functions improving today we will continue to watch it like to move him out of the CCU    Rod Gomes MD  08/21/19  1:38 PM

## 2019-08-21 NOTE — PROGRESS NOTES
"   LOS: 3 days    Patient Care Team:  Cam Duran MD as PCP - General (Family Medicine)    Chief Complaint:    Chief Complaint   Patient presents with   • Back Pain     Follow UP KLAUDIA  Subjective     Interval History:   Recorded UOP only 425 cc.  Reports less orthopnea.  No n/v      Objective     Vital Signs  Temp:  [97.4 °F (36.3 °C)-98.1 °F (36.7 °C)] 98 °F (36.7 °C)  Heart Rate:  [49-76] 57  Resp:  [16-18] 16  BP: ()/(63-81) 101/71    Flowsheet Rows      First Filed Value   Admission Height  177.8 cm (70\") Documented at 08/18/2019 1328   Admission Weight  113 kg (249 lb) Documented at 08/18/2019 1352          I/O this shift:  In: -   Out: 275 [Urine:275]  I/O last 3 completed shifts:  In: 4476 [P.O.:2960; I.V.:1516]  Out: 675 [Urine:675]    Intake/Output Summary (Last 24 hours) at 8/21/2019 1213  Last data filed at 8/21/2019 0800  Gross per 24 hour   Intake 2676 ml   Output 575 ml   Net 2101 ml       Physical Exam:  GEN - NAD, alert  Neck supple no JVD  Lungs CTA bilat on rales  CV RRR no m/g  abd soft NT/ND  vasc no pedal/ankle edema      Results Review:    Results from last 7 days   Lab Units 08/21/19  0540 08/20/19  0445 08/19/19  0259  08/18/19  1339   SODIUM mmol/L 122* 131* 135*  --  139   POTASSIUM mmol/L 4.0 4.5 4.3   < > 3.4*   CHLORIDE mmol/L 86* 90* 96*  --  95*   CO2 mmol/L 19.6* 27.1 21.7*  --  26.7   BUN mg/dL 42* 32* 15  --  12   CREATININE mg/dL 1.75* 2.44* 1.34*  --  1.40*   CALCIUM mg/dL 8.8 9.4 9.3  --  9.7   BILIRUBIN mg/dL  --   --   --   --  0.4   ALK PHOS U/L  --   --   --   --  70   ALT (SGPT) U/L  --   --   --   --  29   AST (SGOT) U/L  --   --   --   --  35   GLUCOSE mg/dL 148* 167* 202*  --  223*    < > = values in this interval not displayed.       Estimated Creatinine Clearance: 54.1 mL/min (A) (by C-G formula based on SCr of 1.75 mg/dL (H)).    Results from last 7 days   Lab Units 08/21/19  0540 08/18/19 2020   MAGNESIUM mg/dL  --  1.6   PHOSPHORUS mg/dL 2.6  --  "       Results from last 7 days   Lab Units 08/21/19  0540   URIC ACID mg/dL 11.3*       Results from last 7 days   Lab Units 08/21/19  0540 08/20/19  0445 08/19/19  0259 08/18/19  1339   WBC 10*3/mm3 17.89* 21.31* 17.11* 16.30*   HEMOGLOBIN g/dL 13.5 14.4 14.8 15.9   PLATELETS 10*3/mm3 284 346 403 450       Results from last 7 days   Lab Units 08/18/19  1339   INR  0.91         Imaging Results (last 24 hours)     Procedure Component Value Units Date/Time    US Renal Bilateral [[id number]] Collected:  08/20/19 1513     Updated:  08/20/19 1712    Narrative:       RENAL ULTRASOUND     HISTORY: Chronic renal disease and worsening renal function.     Both kidneys are normal in size, the right measuring 14 cm in length and  the left measuring 12.2 cm. There is no evidence of renal obstruction.  The urinary bladder is decompressed.     CONCLUSION: Negative bilateral renal ultrasound.     This report was finalized on 8/20/2019 5:09 PM by Dr. Ilia Andino M.D.             aspirin 81 mg Oral Daily   atorvastatin 40 mg Oral Nightly   clopidogrel 75 mg Oral Daily   metoprolol succinate XL 25 mg Oral Q24H   pantoprazole 40 mg Oral QAM          Medication Review:   Current Facility-Administered Medications   Medication Dose Route Frequency Provider Last Rate Last Dose   • acetaminophen (TYLENOL) tablet 650 mg  650 mg Oral Q4H PRN Rod Gomes MD   650 mg at 08/19/19 1326   • aspirin EC tablet 81 mg  81 mg Oral Daily Rod Gomes MD   81 mg at 08/21/19 0857   • atorvastatin (LIPITOR) tablet 40 mg  40 mg Oral Nightly Rod Gomes MD   40 mg at 08/20/19 2004   • clopidogrel (PLAVIX) tablet 75 mg  75 mg Oral Daily Rod Gomes MD   75 mg at 08/21/19 0857   • HYDROcodone-acetaminophen (NORCO) 5-325 MG per tablet 2 tablet  2 tablet Oral Q6H PRN Rod Gomes MD   2 tablet at 08/21/19 0527   • magnesium hydroxide (MILK OF MAGNESIA) suspension 2400 mg/10mL 10 mL  10 mL Oral Daily PRN Rod Gomes MD       •  Magnesium Sulfate 2 gram infusion - Mg less than or equal to 1.5 mg/dL  2 g Intravenous PRN Rod Gomes MD        Or   • Magnesium Sulfate 1 gram infusion - Mg 1.6-1.9 mg/dL  1 g Intravenous PRN Rod Gomes  mL/hr at 08/18/19 2350 1 g at 08/18/19 2350   • metoprolol succinate XL (TOPROL-XL) 24 hr tablet 25 mg  25 mg Oral Q24H Rod Gomes MD   25 mg at 08/21/19 0857   • ondansetron (ZOFRAN) injection 4 mg  4 mg Intravenous Q4H PRN Rod Gmoes MD   4 mg at 08/19/19 0452   • pantoprazole (PROTONIX) EC tablet 40 mg  40 mg Oral Felipe Canales MD   40 mg at 08/21/19 0706   • potassium chloride (KLOR-CON) packet 40 mEq  40 mEq Oral PRN Rod Gomes MD       • potassium chloride (MICRO-K) CR capsule 40 mEq  40 mEq Oral PRN Rod Gomes MD       • sodium chloride 0.9 % flush 10 mL  10 mL Intravenous PRN Godfrey Alegre MD       • sodium chloride 0.9 % flush 10 mL  10 mL Intravenous PRN Godfrey Alegre MD           Assessment/Plan   Oliguric KLAUDIA - Cr 1.3 - 2.4 -> 1.75; suspect ATN related to contrast exposure , mild hypotension, and impaired hemodynamic compensation from chronic NSAID use.   Poss prerenal component, improved with IVF but Na down to 122.     CKD stage 2 or 3 - BL Cr 1.3 - 1.6 since early 2018, likely related in large part to longterm NSAID, as well as hypertensive nephrosclerosis  STEMI, s/p LHC/PCI on 8/18/19  LV dysfunction, EF 35%   Hyponatremia - ? Hypervolemic from IVF admin in setting of LV dysfcn  Hypotension - resolved  GERD - PPI been on hold so not contributory to current picture  Dyslipidemia - on statin  BPH - on statin, bladder scan ok 75cc  H/o nephrolithiasis - quiescent , and US NEG    Chronic NSAID use     Plan  - fluid restrict 1500 cc/day and repeat BMP -- if Na dropping may give dose of IV lasix      Acute MI, inferoposterior wall (CMS/HCC)    Ventricular fibrillation (CMS/HCC)              Fidel Good MD  08/21/19  12:13 PM

## 2019-08-21 NOTE — PLAN OF CARE
Problem: Patient Care Overview  Goal: Plan of Care Review  Outcome: Ongoing (interventions implemented as appropriate)   08/21/19 5918   Coping/Psychosocial   Plan of Care Reviewed With patient   OTHER   Outcome Summary patient remains in ccu overnight. Up to recliner at times to help with chronic back pain. Lortab given, as well. HR remains mid-50's to 60's. In afib still. BP stable. NS stopped at 2230 for a total of 1500ml. Urine output increased. Otherwise stable. Will CTM   Plan of Care Review   Progress improving       Problem: Pain, Chronic (Adult)  Goal: Acceptable Pain/Comfort Level and Functional Ability  Outcome: Ongoing (interventions implemented as appropriate)      Problem: Fall Risk (Adult)  Goal: Absence of Fall  Outcome: Ongoing (interventions implemented as appropriate)      Problem: Skin Injury Risk (Adult)  Goal: Skin Health and Integrity  Outcome: Ongoing (interventions implemented as appropriate)      Problem: Cardiac: ACS (Acute Coronary Syndrome) (Adult)  Goal: Signs and Symptoms of Listed Potential Problems Will be Absent, Minimized or Managed (Cardiac: ACS)  Outcome: Ongoing (interventions implemented as appropriate)      Problem: Pain, Acute (Adult)  Goal: Acceptable Pain Control/Comfort Level  Outcome: Ongoing (interventions implemented as appropriate)

## 2019-08-21 NOTE — PLAN OF CARE
Problem: Patient Care Overview  Goal: Plan of Care Review  Outcome: Ongoing (interventions implemented as appropriate)   08/21/19 6078   Coping/Psychosocial   Plan of Care Reviewed With patient;spouse   OTHER   Outcome Summary MD notified of 12 noon lab results. Patient encouraged to limit fluid intake as ordered by MD. Patient cooperative and amenable. Plan of care explained to patient and spouse. Vitals stable. Will continue to monitor.       Problem: Pain, Chronic (Adult)  Goal: Acceptable Pain/Comfort Level and Functional Ability  Outcome: Ongoing (interventions implemented as appropriate)      Problem: Fall Risk (Adult)  Goal: Absence of Fall  Outcome: Ongoing (interventions implemented as appropriate)      Problem: Skin Injury Risk (Adult)  Goal: Skin Health and Integrity  Outcome: Ongoing (interventions implemented as appropriate)

## 2019-08-22 LAB
ANION GAP SERPL CALCULATED.3IONS-SCNC: 10 MMOL/L (ref 5–15)
BASOPHILS # BLD AUTO: 0.1 10*3/MM3 (ref 0–0.2)
BASOPHILS NFR BLD AUTO: 0.6 % (ref 0–1.5)
BUN BLD-MCNC: 45 MG/DL (ref 8–23)
BUN/CREAT SERPL: 26 (ref 7–25)
CALCIUM SPEC-SCNC: 8.6 MG/DL (ref 8.6–10.5)
CHLORIDE SERPL-SCNC: 89 MMOL/L (ref 98–107)
CO2 SERPL-SCNC: 25 MMOL/L (ref 22–29)
CREAT BLD-MCNC: 1.73 MG/DL (ref 0.76–1.27)
DEPRECATED RDW RBC AUTO: 50.5 FL (ref 37–54)
EOSINOPHIL # BLD AUTO: 0.32 10*3/MM3 (ref 0–0.4)
EOSINOPHIL NFR BLD AUTO: 2 % (ref 0.3–6.2)
ERYTHROCYTE [DISTWIDTH] IN BLOOD BY AUTOMATED COUNT: 14.7 % (ref 12.3–15.4)
GFR SERPL CREATININE-BSD FRML MDRD: 40 ML/MIN/1.73
GLUCOSE BLD-MCNC: 135 MG/DL (ref 65–99)
HCT VFR BLD AUTO: 37.7 % (ref 37.5–51)
HGB BLD-MCNC: 12.1 G/DL (ref 13–17.7)
IMM GRANULOCYTES # BLD AUTO: 0.2 10*3/MM3 (ref 0–0.05)
IMM GRANULOCYTES NFR BLD AUTO: 1.3 % (ref 0–0.5)
LYMPHOCYTES # BLD AUTO: 3.68 10*3/MM3 (ref 0.7–3.1)
LYMPHOCYTES NFR BLD AUTO: 23.5 % (ref 19.6–45.3)
MCH RBC QN AUTO: 30.2 PG (ref 26.6–33)
MCHC RBC AUTO-ENTMCNC: 32.1 G/DL (ref 31.5–35.7)
MCV RBC AUTO: 94 FL (ref 79–97)
MONOCYTES # BLD AUTO: 1.79 10*3/MM3 (ref 0.1–0.9)
MONOCYTES NFR BLD AUTO: 11.4 % (ref 5–12)
NEUTROPHILS # BLD AUTO: 9.55 10*3/MM3 (ref 1.7–7)
NEUTROPHILS NFR BLD AUTO: 61.2 % (ref 42.7–76)
NRBC BLD AUTO-RTO: 1.8 /100 WBC (ref 0–0.2)
OSMOLALITY UR: 225 MOSM/KG
PLATELET # BLD AUTO: 349 10*3/MM3 (ref 140–450)
PMV BLD AUTO: 12.2 FL (ref 6–12)
POTASSIUM BLD-SCNC: 3.9 MMOL/L (ref 3.5–5.2)
RBC # BLD AUTO: 4.01 10*6/MM3 (ref 4.14–5.8)
SODIUM BLD-SCNC: 124 MMOL/L (ref 136–145)
SODIUM UR-SCNC: <20 MMOL/L
URATE SERPL-MCNC: 12.7 MG/DL (ref 3.4–7)
WBC NRBC COR # BLD: 15.64 10*3/MM3 (ref 3.4–10.8)

## 2019-08-22 PROCEDURE — 85025 COMPLETE CBC W/AUTO DIFF WBC: CPT | Performed by: INTERNAL MEDICINE

## 2019-08-22 PROCEDURE — 99232 SBSQ HOSP IP/OBS MODERATE 35: CPT | Performed by: INTERNAL MEDICINE

## 2019-08-22 PROCEDURE — 80048 BASIC METABOLIC PNL TOTAL CA: CPT | Performed by: INTERNAL MEDICINE

## 2019-08-22 PROCEDURE — 84550 ASSAY OF BLOOD/URIC ACID: CPT | Performed by: INTERNAL MEDICINE

## 2019-08-22 PROCEDURE — 83935 ASSAY OF URINE OSMOLALITY: CPT | Performed by: INTERNAL MEDICINE

## 2019-08-22 PROCEDURE — 84300 ASSAY OF URINE SODIUM: CPT | Performed by: INTERNAL MEDICINE

## 2019-08-22 RX ORDER — FLUTICASONE PROPIONATE 50 MCG
2 SPRAY, SUSPENSION (ML) NASAL DAILY PRN
Status: DISCONTINUED | OUTPATIENT
Start: 2019-08-22 | End: 2019-08-23 | Stop reason: HOSPADM

## 2019-08-22 RX ORDER — TAMSULOSIN HYDROCHLORIDE 0.4 MG/1
0.4 CAPSULE ORAL NIGHTLY
Status: DISCONTINUED | OUTPATIENT
Start: 2019-08-22 | End: 2019-08-23 | Stop reason: HOSPADM

## 2019-08-22 RX ORDER — BACLOFEN 10 MG/1
10 TABLET ORAL 3 TIMES DAILY
Status: DISCONTINUED | OUTPATIENT
Start: 2019-08-22 | End: 2019-08-23 | Stop reason: HOSPADM

## 2019-08-22 RX ORDER — FUROSEMIDE 40 MG/1
40 TABLET ORAL DAILY
Status: DISCONTINUED | OUTPATIENT
Start: 2019-08-22 | End: 2019-08-23

## 2019-08-22 RX ORDER — FUROSEMIDE 20 MG/1
20 TABLET ORAL DAILY
Status: DISCONTINUED | OUTPATIENT
Start: 2019-08-22 | End: 2019-08-22

## 2019-08-22 RX ADMIN — TAMSULOSIN HYDROCHLORIDE 0.4 MG: 0.4 CAPSULE ORAL at 21:49

## 2019-08-22 RX ADMIN — HYDROCODONE BITARTRATE AND ACETAMINOPHEN 2 TABLET: 5; 325 TABLET ORAL at 11:53

## 2019-08-22 RX ADMIN — BACLOFEN 10 MG: 10 TABLET ORAL at 15:27

## 2019-08-22 RX ADMIN — CLOPIDOGREL 75 MG: 75 TABLET, FILM COATED ORAL at 08:01

## 2019-08-22 RX ADMIN — BACLOFEN 10 MG: 10 TABLET ORAL at 21:49

## 2019-08-22 RX ADMIN — PANTOPRAZOLE SODIUM 40 MG: 40 TABLET, DELAYED RELEASE ORAL at 07:58

## 2019-08-22 RX ADMIN — HYDROCODONE BITARTRATE AND ACETAMINOPHEN 2 TABLET: 5; 325 TABLET ORAL at 18:56

## 2019-08-22 RX ADMIN — ASPIRIN 81 MG: 81 TABLET, COATED ORAL at 08:02

## 2019-08-22 RX ADMIN — ATORVASTATIN CALCIUM 40 MG: 20 TABLET, FILM COATED ORAL at 21:49

## 2019-08-22 RX ADMIN — FUROSEMIDE 40 MG: 40 TABLET ORAL at 08:02

## 2019-08-22 RX ADMIN — HYDROCODONE BITARTRATE AND ACETAMINOPHEN 2 TABLET: 5; 325 TABLET ORAL at 02:21

## 2019-08-22 NOTE — PROGRESS NOTES
"Frank Patel  1955 64 y.o.  [id number]      Patient Care Team:  Cam Duran MD as PCP - General (Family Medicine)    CC: Inferior STEMI status post PCI, distal no reflow EF 30%    Interval History: Doing well      Objective   Vital Signs  Temp:  [97.7 °F (36.5 °C)-98.5 °F (36.9 °C)] 98.5 °F (36.9 °C)  Heart Rate:  [47-74] 71  Resp:  [18] 18  BP: ()/(56-73) 91/63    Intake/Output Summary (Last 24 hours) at 8/22/2019 1555  Last data filed at 8/22/2019 1529  Gross per 24 hour   Intake 420 ml   Output 3375 ml   Net -2955 ml     Flowsheet Rows      First Filed Value   Admission Height  177.8 cm (70\") Documented at 08/18/2019 1328   Admission Weight  113 kg (249 lb) Documented at 08/18/2019 1352          Physical Exam:   General Appearance:    Alert,oriented, in no acute distress   Lungs:     Clear to auscultation,BS are equal    Heart:    Normal S1 and S2, iRRR without murmur, gallop or rub   HEENT:    Sclerae are clear, no JVD or adenopathy   Abdomen:     Normal bowel sounds, soft non-tender, non-distended, no HSM   Extremities:   Moves all extremities well, no edema, no cyanosis, no             Redness, no rash     Medication Review:        aspirin 81 mg Oral Daily   atorvastatin 40 mg Oral Nightly   baclofen 10 mg Oral TID   clopidogrel 75 mg Oral Daily   furosemide 40 mg Oral Daily   metoprolol succinate XL 25 mg Oral Q24H   pantoprazole 40 mg Oral QAM   tamsulosin 0.4 mg Oral Nightly            I reviewed the patient's new clinical results.  I personally viewed and interpreted the patient's EKG/Telemetry data    Assessment/Plan  Active Hospital Problems    Diagnosis  POA   • Acute MI, inferoposterior wall (CMS/HCC) [I21.19]  Unknown     Priority: High   • Ventricular fibrillation (CMS/HCC) [I49.01]  Unknown     Priority: High      Resolved Hospital Problems   No resolved problems to display.       Inferior STEMI treated with PCI complicated by distal no reflow he also had ventricular fibrillation " done in the ER was shocked once ECG looks better but still has a little bit of ST elevation.  Pretty reduced LV function on his echo also.  Renal function is baseline we will increase his activity and if he is okay go home tomorrow    Rod Gomes MD  08/22/19  3:55 PM

## 2019-08-22 NOTE — PLAN OF CARE
Problem: Patient Care Overview  Goal: Plan of Care Review  Outcome: Ongoing (interventions implemented as appropriate)   08/22/19 3370   Coping/Psychosocial   Plan of Care Reviewed With patient   OTHER   Outcome Summary VSS, Tele OF. Pain meds for chronic back pain. 2L UO. Up ad darling. Metoprolol held this AM for HR 50s and    Plan of Care Review   Progress improving

## 2019-08-23 VITALS
TEMPERATURE: 98.9 F | WEIGHT: 240.52 LBS | OXYGEN SATURATION: 92 % | HEIGHT: 70 IN | DIASTOLIC BLOOD PRESSURE: 83 MMHG | BODY MASS INDEX: 34.43 KG/M2 | SYSTOLIC BLOOD PRESSURE: 115 MMHG | HEART RATE: 86 BPM | RESPIRATION RATE: 18 BRPM

## 2019-08-23 LAB
ANION GAP SERPL CALCULATED.3IONS-SCNC: 13.8 MMOL/L (ref 5–15)
BASOPHILS # BLD AUTO: 0.11 10*3/MM3 (ref 0–0.2)
BASOPHILS NFR BLD AUTO: 0.7 % (ref 0–1.5)
BUN BLD-MCNC: 34 MG/DL (ref 8–23)
BUN/CREAT SERPL: 23.4 (ref 7–25)
CALCIUM SPEC-SCNC: 9.6 MG/DL (ref 8.6–10.5)
CHLORIDE SERPL-SCNC: 94 MMOL/L (ref 98–107)
CO2 SERPL-SCNC: 25.2 MMOL/L (ref 22–29)
CREAT BLD-MCNC: 1.45 MG/DL (ref 0.76–1.27)
DEPRECATED RDW RBC AUTO: 51.8 FL (ref 37–54)
DIFFERENTIAL METHOD BLD: ABNORMAL
EOSINOPHIL # BLD AUTO: 0.42 10*3/MM3 (ref 0–0.4)
EOSINOPHIL NFR BLD AUTO: 2.8 % (ref 0.3–6.2)
ERYTHROCYTE [DISTWIDTH] IN BLOOD BY AUTOMATED COUNT: 15.1 % (ref 12.3–15.4)
GFR SERPL CREATININE-BSD FRML MDRD: 49 ML/MIN/1.73
GLUCOSE BLD-MCNC: 113 MG/DL (ref 65–99)
HCT VFR BLD AUTO: 40 % (ref 37.5–51)
HGB BLD-MCNC: 12.7 G/DL (ref 13–17.7)
IMM GRANULOCYTES # BLD AUTO: 0.13 10*3/MM3 (ref 0–0.05)
IMM GRANULOCYTES NFR BLD AUTO: 0.9 % (ref 0–0.5)
LYMPHOCYTES # BLD AUTO: 4.44 10*3/MM3 (ref 0.7–3.1)
LYMPHOCYTES NFR BLD AUTO: 29.2 % (ref 19.6–45.3)
MCH RBC QN AUTO: 30.2 PG (ref 26.6–33)
MCHC RBC AUTO-ENTMCNC: 31.8 G/DL (ref 31.5–35.7)
MCV RBC AUTO: 95.2 FL (ref 79–97)
MONOCYTES # BLD AUTO: 1.91 10*3/MM3 (ref 0.1–0.9)
MONOCYTES NFR BLD AUTO: 12.6 % (ref 5–12)
NEUTROPHILS # BLD AUTO: 8.17 10*3/MM3 (ref 1.7–7)
NEUTROPHILS NFR BLD AUTO: 53.8 % (ref 42.7–76)
NRBC BLD AUTO-RTO: 2.3 /100 WBC (ref 0–0.2)
PLATELET # BLD AUTO: 404 10*3/MM3 (ref 140–450)
PMV BLD AUTO: 11.4 FL (ref 6–12)
POTASSIUM BLD-SCNC: 3.8 MMOL/L (ref 3.5–5.2)
RBC # BLD AUTO: 4.2 10*6/MM3 (ref 4.14–5.8)
SODIUM BLD-SCNC: 133 MMOL/L (ref 136–145)
WBC # BLD AUTO: 15.18 10*3/MM3 (ref 3.4–10.8)
WBC NRBC COR # BLD: 15.18 10*3/MM3 (ref 3.4–10.8)

## 2019-08-23 PROCEDURE — 85025 COMPLETE CBC W/AUTO DIFF WBC: CPT | Performed by: INTERNAL MEDICINE

## 2019-08-23 PROCEDURE — 80048 BASIC METABOLIC PNL TOTAL CA: CPT | Performed by: INTERNAL MEDICINE

## 2019-08-23 PROCEDURE — 99238 HOSP IP/OBS DSCHRG MGMT 30/<: CPT | Performed by: INTERNAL MEDICINE

## 2019-08-23 RX ORDER — FUROSEMIDE 20 MG/1
20 TABLET ORAL DAILY
Status: DISCONTINUED | OUTPATIENT
Start: 2019-08-23 | End: 2019-08-23 | Stop reason: HOSPADM

## 2019-08-23 RX ORDER — FUROSEMIDE 20 MG/1
20 TABLET ORAL DAILY
Qty: 30 TABLET | Refills: 6 | Status: SHIPPED | OUTPATIENT
Start: 2019-08-24 | End: 2020-02-18 | Stop reason: ALTCHOICE

## 2019-08-23 RX ORDER — POTASSIUM CHLORIDE 750 MG/1
20 TABLET, FILM COATED, EXTENDED RELEASE ORAL DAILY
Qty: 30 TABLET | Refills: 11 | Status: SHIPPED | OUTPATIENT
Start: 2019-08-23 | End: 2019-08-23 | Stop reason: SDUPTHER

## 2019-08-23 RX ORDER — ATORVASTATIN CALCIUM 40 MG/1
40 TABLET, FILM COATED ORAL NIGHTLY
Qty: 30 TABLET | Refills: 6 | Status: SHIPPED | OUTPATIENT
Start: 2019-08-23 | End: 2019-08-30 | Stop reason: SDUPTHER

## 2019-08-23 RX ORDER — POTASSIUM CHLORIDE 750 MG/1
20 TABLET, FILM COATED, EXTENDED RELEASE ORAL DAILY
Qty: 60 TABLET | Refills: 11 | Status: SHIPPED | OUTPATIENT
Start: 2019-08-23 | End: 2020-02-18 | Stop reason: ALTCHOICE

## 2019-08-23 RX ORDER — CLOPIDOGREL BISULFATE 75 MG/1
75 TABLET ORAL DAILY
Qty: 30 TABLET | Refills: 6 | Status: SHIPPED | OUTPATIENT
Start: 2019-08-24 | End: 2019-08-30 | Stop reason: SDUPTHER

## 2019-08-23 RX ADMIN — CLOPIDOGREL 75 MG: 75 TABLET, FILM COATED ORAL at 08:34

## 2019-08-23 RX ADMIN — ASPIRIN 81 MG: 81 TABLET, COATED ORAL at 08:34

## 2019-08-23 RX ADMIN — PANTOPRAZOLE SODIUM 40 MG: 40 TABLET, DELAYED RELEASE ORAL at 06:04

## 2019-08-23 RX ADMIN — HYDROCODONE BITARTRATE AND ACETAMINOPHEN 2 TABLET: 5; 325 TABLET ORAL at 12:48

## 2019-08-23 RX ADMIN — BACLOFEN 10 MG: 10 TABLET ORAL at 08:34

## 2019-08-23 RX ADMIN — HYDROCODONE BITARTRATE AND ACETAMINOPHEN 2 TABLET: 5; 325 TABLET ORAL at 02:03

## 2019-08-23 RX ADMIN — FUROSEMIDE 20 MG: 40 TABLET ORAL at 08:34

## 2019-08-23 RX ADMIN — METOPROLOL SUCCINATE 25 MG: 25 TABLET, FILM COATED, EXTENDED RELEASE ORAL at 08:34

## 2019-08-23 NOTE — PLAN OF CARE
Problem: Patient Care Overview  Goal: Plan of Care Review  Outcome: Ongoing (interventions implemented as appropriate)   08/23/19 0241   Coping/Psychosocial   Plan of Care Reviewed With patient   OTHER   Outcome Summary VSS. A-fib 80's. Complaints of back pain, medicated per MAR. CTM   Plan of Care Review   Progress improving       Problem: Pain, Chronic (Adult)  Goal: Acceptable Pain/Comfort Level and Functional Ability  Outcome: Ongoing (interventions implemented as appropriate)      Problem: Fall Risk (Adult)  Goal: Absence of Fall  Outcome: Ongoing (interventions implemented as appropriate)      Problem: Skin Injury Risk (Adult)  Goal: Skin Health and Integrity  Outcome: Ongoing (interventions implemented as appropriate)      Problem: Cardiac: ACS (Acute Coronary Syndrome) (Adult)  Goal: Signs and Symptoms of Listed Potential Problems Will be Absent, Minimized or Managed (Cardiac: ACS)  Outcome: Ongoing (interventions implemented as appropriate)      Problem: Pain, Acute (Adult)  Goal: Acceptable Pain Control/Comfort Level  Outcome: Ongoing (interventions implemented as appropriate)

## 2019-08-23 NOTE — CONSULTS
Met with patient to discuss the benefits of cardiac rehab. Contact information given Instructed to bring after visit summary to first visit.

## 2019-08-23 NOTE — PLAN OF CARE
Problem: Patient Care Overview  Goal: Individualization and Mutuality  Outcome: Outcome(s) achieved Date Met: 08/23/19    Goal: Discharge Needs Assessment  Outcome: Outcome(s) achieved Date Met: 08/23/19 08/23/19 1215   Discharge Needs Assessment   Readmission Within the Last 30 Days no previous admission in last 30 days   Concerns to be Addressed no discharge needs identified   Patient/Family Anticipates Transition to home   Patient/Family Anticipated Services at Transition none   Transportation Concerns car, none   Anticipated Changes Related to Illness none   Equipment Needed After Discharge none   Disability   Equipment Currently Used at Home none     Goal: Interprofessional Rounds/Family Conf  Outcome: Outcome(s) achieved Date Met: 08/23/19      Problem: Pain, Chronic (Adult)  Goal: Acceptable Pain/Comfort Level and Functional Ability  Outcome: Outcome(s) achieved Date Met: 08/23/19 08/23/19 1215   Pain, Chronic (Adult)   Acceptable Pain/Comfort Level and Functional Ability achieves outcome       Problem: Fall Risk (Adult)  Goal: Absence of Fall  Outcome: Outcome(s) achieved Date Met: 08/23/19 08/23/19 1215   Fall Risk (Adult)   Absence of Fall achieves outcome       Problem: Skin Injury Risk (Adult)  Goal: Skin Health and Integrity  Outcome: Outcome(s) achieved Date Met: 08/23/19 08/23/19 1215   Skin Injury Risk (Adult)   Skin Health and Integrity achieves outcome       Problem: Cardiac: ACS (Acute Coronary Syndrome) (Adult)  Goal: Signs and Symptoms of Listed Potential Problems Will be Absent, Minimized or Managed (Cardiac: ACS)  Outcome: Outcome(s) achieved Date Met: 08/23/19 08/23/19 1215   Goal/Outcome Evaluation   Problems Assessed (Acute Coronary Syndrome) all   Problems Present (Acute Coronary Syn) none       Problem: Pain, Acute (Adult)  Goal: Acceptable Pain Control/Comfort Level  Outcome: Outcome(s) achieved Date Met: 08/23/19 08/23/19 1215   Pain, Acute (Adult)   Acceptable Pain  Control/Comfort Level achieves outcome

## 2019-08-23 NOTE — DISCHARGE SUMMARY
Frank Patel  [id number]    Date of Admit: 8/18/2019  Date of Discharge:  8/23/2019    Discharge Diagnosis:  Active Hospital Problems    Diagnosis  POA   • Acute MI, inferoposterior wall (CMS/HCC) [I21.19]  Yes     Priority: High   • Ventricular fibrillation (CMS/HCC) [I49.01]  No     Priority: High      Resolved Hospital Problems   No resolved problems to display.       Hospital Course: Came in to hospital with substernal chest pain lasting for about 2 hours. He has a history of A fib and is anticoagulated with aspirin. In the ER he had an episode of Vfib and he was shocked one time and placed on lidocaine for low HR. He was started on Heparin and Plavix and sent emergently to Cath Lab. His EKG showed acute inferolateral and a little bit of an anterior STEMI and he was taken to cath lab. A ROBY was placed in the Proximal RCA and Integrilin was infused for 18 hours. An Echo was performed 8/19/19 post cath and showed an EF of 35%. He is being sent home on  DAPT with Aspirin 81 mg and Plavix 75 mg. He will need to follow up with RACHNA Rogel or BEATRICE Jaffe in one week and with Dr. Gomes in 4 weeks. She will also need to enroll in cardiac rehab. He will not be sent home on ACE or ARB due to elevated creatinine and borderline low blood pressures. This will be re evaluated at his one week follow up.     Procedures Performed  Procedure(s)  Left Heart Cath  Stent ROBY coronary  Coronary angiography  Left ventriculography  ECHO 8/19/2019  ·  Left ventricular systolic function is moderately decreased. Calculated EF = 35.0%. Estimated EF appears to be in the range of 36 - 40%. Normal left ventricular cavity size noted. Global left ventricular wall motion appears abnormal. Left ventricular wall thickness is consistent with moderate concentric hypertrophy. Left ventricular diastolic function was indeterminate.  Left Heart Cath 8/18/2019  FINDINGS:     LEFT VENTRICULOGRAPHY: The LV pressure was 140/30.  There was  normal to hyperdynamic anterior function but a large area of inferior severe hypokinesis to akinesis EF overall is about 35% there is some calcium in.  There was no mitral insufficiency or gradient across the aortic valve on pullback.     CORONARY ANGIOGRAPHY:  Left main: 10% luminal irregularities  Left anterior descending: There is a long area of its 50% stenosed in the proximal LAD first diagonal is a fairly large vessel with 20 to 30% disease at the origin the distal LAD and mid LAD are both normal  Ramus intermedius:Not present  Circumflex: There is a moderate-sized OM1 is very high rising free of disease the remainder of the circumflex is actually fairly small  RCA: Is a dominant vessel.  There is some diffuse 30% proximal disease and then 100% occlusion proximally above the RV branch     Interventional Note:  A JR4 guide intubated the right coronary artery.   An additional 11,000 units of heparin was given and the ACT was therapeutic.  600 mg of clopidogrel was given as a loading dose in the emergency room. A BMW wire was passed into the distal RCA.  3.5 x 20 mm trek balloon was inflated at 14 yasmany twice reestablishing flow to this vessel although there was some evidence of slowish flow distally and distal embolization already.  I then predilated this further with a 4.0 x 30 mm trek balloon at 10 yasmany twice this led to worsening distal flow as well as a pretty severe dissection proximally but it was a right size balloon.  At that point I brought a 4.0 x 38 mm Xience Joelle drug-eluting stent down and deployed it at 14 yasmany and then I brought a 4.0 x 23 mm Xience Joelle drug-eluting stent down and deployed it at 14 yasmany in the proximal RCA.  I then brought a twin pass catheter down gave high doses of intracoronary nitroprusside and then eventually some intracoronary adenosine at 60 mcg.  He continued to have slow flow he could even see evidence of distal embolization I moved the wire and surrounding each of the  branches distally in an effort to move any kind of macroscopic thrombus distally I then postdilated the stents with a 4.0x20 NC trek balloon at 20 yasmany 3 times.  We repositioned the twin pass catheter distally and gave higher doses of adenosine and nitroprusside without really much effect we still had MOOK I flow with the distal portion of the vessel I elected to put him on intra-venous Integrilin in an effort to try and improve our platelet dysfunction.  At that point there was nothing else I could do to improve flow in this vessel and this case was halted balloons wires and guides were removed sheath was removed on the table TR band was applied     SUMMARY: Angioplasty drug-eluting stenting of RCA complicated by distal no reflow     EBL:                     Minimal  Specimens:         None     PCI Segment:                   Proximal RCA  Pre-stenosis:                    100  Post-stenosis                   0  Lesion Type                      C  MOOK Flow Pre                   0  MOOK Flow Post                 1  Dissection                        none     RECOMMENDATIONS:Routine post myocardial infarction and percutaneous coronary intervention care.  He is probably going to end up having a significant inferior infarct unfortunately in his RCA is a large vessel we will give the Integrilin for 18 hours  Echo in 24 hours  Aggressive risk modification  Cardiac rehab referral            Consults     Date and Time Order Name Status Description    8/20/2019 0914 Inpatient Nephrology Consult Completed     8/18/2019 1338 Consult Interventional Cardiology and Notify Cath Lab Completed           Discharge Medications     Your medication list      START taking these medications      Instructions Last Dose Given Next Dose Due   atorvastatin 40 MG tablet  Commonly known as:  LIPITOR      Take 1 tablet by mouth Every Night.       clopidogrel 75 MG tablet  Commonly known as:  PLAVIX  Start taking on:  8/24/2019      Take 1 tablet  by mouth Daily.       furosemide 20 MG tablet  Commonly known as:  LASIX  Start taking on:  8/24/2019      Take 1 tablet by mouth Daily.       potassium chloride 10 MEQ CR tablet  Commonly known as:  K-DUR      Take 2 tablets by mouth Daily.          CONTINUE taking these medications      Instructions Last Dose Given Next Dose Due   aspirin 81 MG chewable tablet      Chew 81 mg Daily.       baclofen 10 MG tablet  Commonly known as:  LIORESAL      Take 10 mg by mouth 3 (Three) Times a Day.       fluticasone 50 MCG/ACT nasal spray  Commonly known as:  FLONASE      2 sprays into the nostril(s) as directed by provider Daily As Needed for Rhinitis.       HYDROcodone-acetaminophen  MG per tablet  Commonly known as:  NORCO      Take 1 tablet by mouth Every 6 (Six) Hours As Needed for Moderate Pain .       metoprolol succinate XL 25 MG 24 hr tablet  Commonly known as:  TOPROL-XL      Take 25 mg by mouth Daily.       NON FORMULARY      Take 1 tablet by mouth Daily.       NON FORMULARY      Apply 1 each topically to the appropriate area as directed Daily.       pantoprazole 40 MG EC tablet  Commonly known as:  PROTONIX      Take 40 mg by mouth Daily.       tamsulosin 0.4 MG capsule 24 hr capsule  Commonly known as:  FLOMAX      Take 1 capsule by mouth Every Night.          STOP taking these medications    amLODIPine 2.5 MG tablet  Commonly known as:  NORVASC        fenofibrate 145 MG tablet  Commonly known as:  TRICOR        hydrochlorothiazide 12.5 MG capsule  Commonly known as:  MICROZIDE        nabumetone 500 MG tablet  Commonly known as:  RELAFEN              Where to Get Your Medications      These medications were sent to [facility]. [address] [phone] [facility] [phone] [address] [address]    Phone:  [phone]   · atorvastatin 40 MG tablet  · clopidogrel 75 MG tablet  · furosemide 20 MG tablet  · potassium chloride 10 MEQ CR tablet          Discharge Diet:  Cardiac diet    Activity at Discharge: As tolerated    Discharge disposition: Home    Condition on Discharge: Stable    Follow-up Appointments  No future appointments.  Additional Instructions for the Follow-ups that You Need to Schedule     Discharge Follow-up with Specialty: Cardiology   As directed      Specialty:  Cardiology    Follow Up Details:  Follow up with Nadine Chen or Justine BRISCOE/NP in 1 week and Dr. Gomes in 4 weeks.               Test Results Pending at Discharge       Rod Gomes MD  08/23/19  12:10 PM

## 2019-08-23 NOTE — PROGRESS NOTES
"   LOS: 5 days    Patient Care Team:  Cam Duran MD as PCP - General (Family Medicine)    Chief Complaint:    Chief Complaint   Patient presents with   • Back Pain     Follow UP KLAUDIA  Subjective     Interval History:   BP dropped to 90s systolic yest.  UOP 4.1L/24h with PO lasix.  Denies dyspnea or n/v    Objective     Vital Signs  Temp:  [97.7 °F (36.5 °C)-98.6 °F (37 °C)] 98.3 °F (36.8 °C)  Heart Rate:  [47-86] 86  Resp:  [16-18] 18  BP: ()/(63-94) 118/91    Flowsheet Rows      First Filed Value   Admission Height  177.8 cm (70\") Documented at 08/18/2019 1328   Admission Weight  113 kg (249 lb) Documented at 08/18/2019 1352          I/O this shift:  In: 400 [P.O.:400]  Out: -   I/O last 3 completed shifts:  In: 1440 [P.O.:1440]  Out: 4650 [Urine:4650]    Intake/Output Summary (Last 24 hours) at 8/23/2019 0809  Last data filed at 8/23/2019 0730  Gross per 24 hour   Intake 1840 ml   Output 3450 ml   Net -1610 ml       Physical Exam:  GEN nad, alert  Neck supple no JVD  Lungs CTA bilat no rales  CV RRR   abd soft NT/ND  vasc no pedal/ankle edema     Results Review:    Results from last 7 days   Lab Units 08/23/19  0446 08/22/19  0358 08/21/19  1240  08/18/19  1339   SODIUM mmol/L 133* 124* 124*   < > 139   POTASSIUM mmol/L 3.8 3.9 3.8   < > 3.4*   CHLORIDE mmol/L 94* 89* 89*   < > 95*   CO2 mmol/L 25.2 25.0 26.1   < > 26.7   BUN mg/dL 34* 45* 43*   < > 12   CREATININE mg/dL 1.45* 1.73* 1.86*   < > 1.40*   CALCIUM mg/dL 9.6 8.6 8.8   < > 9.7   BILIRUBIN mg/dL  --   --   --   --  0.4   ALK PHOS U/L  --   --   --   --  70   ALT (SGPT) U/L  --   --   --   --  29   AST (SGOT) U/L  --   --   --   --  35   GLUCOSE mg/dL 113* 135* 156*   < > 223*    < > = values in this interval not displayed.       Estimated Creatinine Clearance: 65.3 mL/min (A) (by C-G formula based on SCr of 1.45 mg/dL (H)).    Results from last 7 days   Lab Units 08/21/19  0540 08/18/19 2020   MAGNESIUM mg/dL  --  1.6   PHOSPHORUS mg/dL 2.6 "  --        Results from last 7 days   Lab Units 08/22/19  0358 08/21/19  0540   URIC ACID mg/dL 12.7* 11.3*       Results from last 7 days   Lab Units 08/23/19  0446 08/22/19  0358 08/21/19  0540 08/20/19  0445 08/19/19  0259   WBC 10*3/mm3 15.18*  15.18* 15.64* 17.89* 21.31* 17.11*   HEMOGLOBIN g/dL 12.7* 12.1* 13.5 14.4 14.8   PLATELETS 10*3/mm3 404 349 284 346 403       Results from last 7 days   Lab Units 08/18/19  1339   INR  0.91         Imaging Results (last 24 hours)     ** No results found for the last 24 hours. **          aspirin 81 mg Oral Daily   atorvastatin 40 mg Oral Nightly   baclofen 10 mg Oral TID   clopidogrel 75 mg Oral Daily   furosemide 40 mg Oral Daily   metoprolol succinate XL 25 mg Oral Q24H   pantoprazole 40 mg Oral QAM   tamsulosin 0.4 mg Oral Nightly          Medication Review:   Current Facility-Administered Medications   Medication Dose Route Frequency Provider Last Rate Last Dose   • acetaminophen (TYLENOL) tablet 650 mg  650 mg Oral Q4H PRN Rod Gomes MD   650 mg at 08/19/19 1326   • aspirin EC tablet 81 mg  81 mg Oral Daily Rod Gomes MD   81 mg at 08/22/19 0802   • atorvastatin (LIPITOR) tablet 40 mg  40 mg Oral Nightly Rod Gomes MD   40 mg at 08/22/19 2149   • baclofen (LIORESAL) tablet 10 mg  10 mg Oral TID Rod Gomes MD   10 mg at 08/22/19 2149   • clopidogrel (PLAVIX) tablet 75 mg  75 mg Oral Daily Rod Gomes MD   75 mg at 08/22/19 0801   • fluticasone (FLONASE) 50 MCG/ACT nasal spray 2 spray  2 spray Nasal Daily PRN Rod Gomes MD       • furosemide (LASIX) tablet 40 mg  40 mg Oral Daily Fidel Good MD   40 mg at 08/22/19 0802   • HYDROcodone-acetaminophen (NORCO) 5-325 MG per tablet 2 tablet  2 tablet Oral Q6H PRN Rod Gomes MD   2 tablet at 08/23/19 0203   • magnesium hydroxide (MILK OF MAGNESIA) suspension 2400 mg/10mL 10 mL  10 mL Oral Daily PRN Rod Gomes MD       • Magnesium Sulfate 2 gram infusion - Mg less  than or equal to 1.5 mg/dL  2 g Intravenous PRN Rod Gomes MD        Or   • Magnesium Sulfate 1 gram infusion - Mg 1.6-1.9 mg/dL  1 g Intravenous PRN Rod Gomes  mL/hr at 08/18/19 2350 1 g at 08/18/19 2350   • metoprolol succinate XL (TOPROL-XL) 24 hr tablet 25 mg  25 mg Oral Q24H Rod Gomes MD   Stopped at 08/22/19 0945   • ondansetron (ZOFRAN) injection 4 mg  4 mg Intravenous Q4H PRN Rod Gomes MD   4 mg at 08/19/19 0452   • pantoprazole (PROTONIX) EC tablet 40 mg  40 mg Oral Felipe Canales MD   40 mg at 08/23/19 0604   • potassium chloride (KLOR-CON) packet 40 mEq  40 mEq Oral PRN Rod Gomes MD       • potassium chloride (MICRO-K) CR capsule 40 mEq  40 mEq Oral PRN Rod Gomes MD       • sodium chloride 0.9 % flush 10 mL  10 mL Intravenous PRN Godfrey Alegre MD       • sodium chloride 0.9 % flush 10 mL  10 mL Intravenous PRN Godfrey Alegre MD       • tamsulosin (FLOMAX) 24 hr capsule 0.4 mg  0.4 mg Oral Nightly Rod Gomes MD   0.4 mg at 08/22/19 2141       Assessment/Plan   Oliguric KLAUDIA - Resolved; Cr down to 1.4 today (peak 2.4); suspect ATN related to contrast exposure, mild hypotension, and impaired hemodynamic compensation from chronic NSAID use.   Robust UOP following PO lasix.       CKD stage 2 or 3 - BL Cr 1.3 - 1.6 since early 2018, likely related in large part to longterm NSAID, as well as hypertensive nephrosclerosis  STEMI, s/p LHC/PCI on 8/18/19  LV dysfunction, EF 35% - euvolemic currently but had orthopnea off & on and think he'll need low dose diuretic upon discharge   Hyponatremia - ?hypervolemic in setting of MI, LV dysfcn -- much better with diuresis  Hypotension - resolved  Dyslipidemia - on statin  BPH - on flomax  H/o nephrolithiasis - quiescent , and US NEG    Chronic NSAID use -- advised tylenol moving forward    Plan  - ok with DC today  - dec lasix 20mg daily upon discharge  - will arrange nephrology follow up in 4 weeks      Acute  MI, inferoposterior wall (CMS/HCC)    Ventricular fibrillation (CMS/HCC)              Fidel Good MD  08/23/19  8:09 AM

## 2019-08-24 ENCOUNTER — READMISSION MANAGEMENT (OUTPATIENT)
Dept: CALL CENTER | Facility: HOSPITAL | Age: 64
End: 2019-08-24

## 2019-08-24 NOTE — OUTREACH NOTE
Prep Survey      Responses   Facility patient discharged from?  [facility]   Is patient eligible?  Yes   Discharge diagnosis  Acute MI   Does the patient have one of the following disease processes/diagnoses(primary or secondary)?  Acute MI (STEMI,NSTEMI)   Does the patient have Home health ordered?  No   Is there a DME ordered?  No   Prep survey completed?  Yes          Thelma Bill RN

## 2019-08-26 ENCOUNTER — READMISSION MANAGEMENT (OUTPATIENT)
Dept: CALL CENTER | Facility: HOSPITAL | Age: 64
End: 2019-08-26

## 2019-08-26 ENCOUNTER — TELEPHONE (OUTPATIENT)
Dept: CARDIAC REHAB | Facility: HOSPITAL | Age: 64
End: 2019-08-26

## 2019-08-26 NOTE — TELEPHONE ENCOUNTER
Called pt and left VM about scheduling phase II cardiac rehab.  Pt lives in [place].  Not sure if he would prefer to attend our program or one closer to him.

## 2019-08-29 PROBLEM — K21.9 GERD (GASTROESOPHAGEAL REFLUX DISEASE): Status: ACTIVE | Noted: 2019-08-29

## 2019-08-29 PROBLEM — N13.8 BPH WITH OBSTRUCTION/LOWER URINARY TRACT SYMPTOMS: Status: ACTIVE | Noted: 2019-08-29

## 2019-08-29 PROBLEM — E29.1 HYPOGONADISM, MALE: Status: ACTIVE | Noted: 2019-08-29

## 2019-08-29 PROBLEM — M51.36 DDD (DEGENERATIVE DISC DISEASE), LUMBAR: Status: ACTIVE | Noted: 2019-08-29

## 2019-08-29 PROBLEM — E78.5 HYPERLIPIDEMIA: Status: ACTIVE | Noted: 2019-08-29

## 2019-08-29 PROBLEM — N40.1 BPH WITH OBSTRUCTION/LOWER URINARY TRACT SYMPTOMS: Status: ACTIVE | Noted: 2019-08-29

## 2019-08-29 PROBLEM — N20.9 UROLITHIASIS: Status: ACTIVE | Noted: 2019-08-29

## 2019-08-29 PROBLEM — M51.369 DDD (DEGENERATIVE DISC DISEASE), LUMBAR: Status: ACTIVE | Noted: 2019-08-29

## 2019-08-29 PROBLEM — Z79.899 MEDICATION MANAGEMENT: Status: ACTIVE | Noted: 2019-01-11

## 2019-08-29 NOTE — PROGRESS NOTES
Date of Office Visit: 2019  Encounter Provider: BEATRICE Figueroa  Place of Service: [facility] CARDIOLOGY  Patient Name: Frank Patel  :1955      Subjective:     Chief Complaint:  1 week hospital follow up inferolateral STEMI with complications    History of Present Illness:  Frank Patel is a pleasant 64 y.o. male who is new to me.  Outside records have been obtained and reviewed by me.     Patient has a history of atrial fibrillation previously maintained on aspirin, obesity, hypertension, kidney stones, GERD, leukocytosis.  He had a recent inferior STEMI and subsequent ventricular fibrillation.    2019 patient presented with acute inferolateral STEMI with slight anterior ST elevation.  He went into ventricular fibrillation requiring one defibrillator shock.  He was put on a lidocaine drip and taken emergently to the Cath Lab.  Cath showed 10% left main luminal irregularities, long 50% stenosis of proximal LAD with 20 to 30% disease of large first diagonal, fairly small circumflex, moderate size OM 1 without disease, 30% proximal RCA disease with 100% occlusion above the RV branch complicated by no reflow.  Echocardiogram showed moderately decrease EF of 35% with inferolateral akinesis, moderate concentric LVH, indeterminate left ventricular diastolic function, no significant valvular disease. Patient developed acute renal insufficiency and losartan had to be stopped nephrology was consulted and his renal function improved to baseline.  It was suspected related to hypotension, ATN secondary to contrast exposure, hypertensive nephrosclerosis and chronic NSAID use.  He was given p.o. Lasix for orthopnea and diuresed well.  2019 he was felt safe for discharge. ACE inhibitor/ARB was not started due to the patient having low blood pressure and renal insufficiency.  The plan was to reassess at his 1 week follow-up visit.      He is presenting today for  one-week follow-up visit.  He has been doing well since his last visit.  He denies any exertional angina, shortness of breath, cough, PND, lower extremity edema or abdominal bloating.  He does not weigh himself regularly but does not believe he has been gaining weight at home.  He does have chronically sleep elevated due to history of a bad back.  He denies any bleeding issues on dual antiplatelet therapy.  Reports he was never started on systemic anticoagulation when he was diagnosed with atrial fibrillation due to having issues with hemorrhoidal bleeding.  He has been periodically monitoring his blood pressure at home and denies that his blood pressures been low at home or that he said heart rate sustaining greater than 100.  He report occasional left-sided sharp stabbing pain that lasts just for a couple seconds.  He has had a few recurrences of this over the last couple days but no other associated symptoms.  He denies any issues with his left radial cardiac catheterization site.      Past Medical History:   Diagnosis Date   • Acute MI, inferoposterior wall (CMS/HCC)    • Atrial fibrillation (CMS/HCC)    • Elevated cholesterol    • GERD (gastroesophageal reflux disease)    • Head trauma 2007   • HTN (hypertension)    • Kidney stone    • Muscle spasm    • Ventricular fibrillation (CMS/HCC)      Past Surgical History:   Procedure Laterality Date   • APPENDECTOMY     • CARDIAC CATHETERIZATION N/A 8/18/2019    Procedure: Left Heart Cath;  Surgeon: Rod Gomes MD;  Location: [facility] CATH INVASIVE LOCATION;  Service: Cardiology   • CARDIAC CATHETERIZATION N/A 8/18/2019    Procedure: Stent ROBY coronary;  Surgeon: Rod Gomes MD;  Location: [facility] CATH INVASIVE LOCATION;  Service: Cardiology   • CARDIAC CATHETERIZATION N/A 8/18/2019    Procedure: Coronary angiography;  Surgeon: Rod Gomes MD;  Location: [facility] CATH INVASIVE LOCATION;  Service: Cardiology   • CARDIAC CATHETERIZATION N/A 8/18/2019     Procedure: Left ventriculography;  Surgeon: Rod Gomes MD;  Location: [facility] INVASIVE LOCATION;  Service: Cardiology   • FEMUR FRACTURE SURGERY Right 1990   • HAND SURGERY Right    • SPLENECTOMY  2007     Outpatient Medications Prior to Visit   Medication Sig Dispense Refill   • aspirin 81 MG chewable tablet Chew 81 mg Daily.     • atorvastatin (LIPITOR) 40 MG tablet Take 1 tablet by mouth Every Night. 30 tablet 6   • baclofen (LIORESAL) 10 MG tablet Take 10 mg by mouth 3 (Three) Times a Day.     • clopidogrel (PLAVIX) 75 MG tablet Take 1 tablet by mouth Daily. 30 tablet 6   • fluticasone (FLONASE) 50 MCG/ACT nasal spray 2 sprays into the nostril(s) as directed by provider Daily As Needed for Rhinitis.     • furosemide (LASIX) 20 MG tablet Take 1 tablet by mouth Daily. 30 tablet 6   • HYDROcodone-acetaminophen (NORCO)  MG per tablet Take 1 tablet by mouth Every 6 (Six) Hours As Needed for Moderate Pain .     • metoprolol succinate XL (TOPROL-XL) 25 MG 24 hr tablet Take 25 mg by mouth Daily.     • NON FORMULARY Apply 1 each topically to the appropriate area as directed Daily.     • pantoprazole (PROTONIX) 40 MG EC tablet Take 40 mg by mouth Daily.     • potassium chloride (K-DUR) 10 MEQ CR tablet Take 2 tablets by mouth Daily. 60 tablet 11   • tamsulosin (FLOMAX) 0.4 MG capsule 24 hr capsule Take 1 capsule by mouth Every Night.     • NON FORMULARY Take 1 tablet by mouth Daily.       No facility-administered medications prior to visit.        Allergies as of 08/30/2019 - Reviewed 08/30/2019   Allergen Reaction Noted   • Topiramate Other (See Comments) 11/21/2016     Social History     Socioeconomic History   • Marital status:      Spouse name: Not on file   • Number of children: Not on file   • Years of education: Not on file   • Highest education level: Not on file   Tobacco Use   • Smoking status: Former Smoker     Types: Cigarettes     Last attempt to quit: 8/18/1999     Years since  "quittin.0   • Smokeless tobacco: Never Used   Substance and Sexual Activity   • Alcohol use: Yes     Alcohol/week: 1.8 oz     Types: 3 Cans of beer per week     Comment: \"2-3 drinks\"    • Drug use: Defer   • Sexual activity: Defer     History reviewed. No pertinent family history.  Review of Systems   Constitution: Negative for chills, fever and malaise/fatigue.   HENT: Negative for ear pain, hearing loss, nosebleeds and sore throat.    Eyes: Negative for double vision, pain, vision loss in left eye and vision loss in right eye.   Cardiovascular: Negative for chest pain, claudication, dyspnea on exertion, irregular heartbeat, leg swelling, near-syncope, palpitations, paroxysmal nocturnal dyspnea and syncope.   Respiratory: Negative for cough, shortness of breath, snoring and wheezing.    Endocrine: Negative for cold intolerance and heat intolerance.   Hematologic/Lymphatic: Negative for bleeding problem.   Skin: Negative for color change, itching, rash and unusual hair distribution.   Musculoskeletal: Negative for joint pain and joint swelling.   Gastrointestinal: Negative for abdominal pain, diarrhea, hematochezia, melena, nausea and vomiting.   Genitourinary: Negative for decreased libido, frequency, hematuria, hesitancy and incomplete emptying.   Neurological: Negative for excessive daytime sleepiness, dizziness, headaches, light-headedness, loss of balance, numbness, paresthesias and seizures.   Psychiatric/Behavioral: Negative for depression.          Objective:     Vitals:    19 1332 19 1405   BP: 110/78    BP Location: Right arm    Patient Position: Sitting    Cuff Size: Adult    Pulse: 99 97   SpO2:  98%   Weight: 106 kg (234 lb 3.2 oz)    Height: 177.8 cm (70\")      Body mass index is 33.6 kg/m².    PHYSICAL EXAM:  Physical Exam   Constitutional: He is oriented to person, place, and time. He appears well-developed and well-nourished. No distress.   Obese   HENT:   Head: Normocephalic and " atraumatic.   Eyes: Conjunctivae and EOM are normal. Pupils are equal, round, and reactive to light.   Wearing glasses   Neck: No JVD present. Carotid bruit is not present.   Cardiovascular: Normal rate, normal heart sounds and intact distal pulses. An irregularly irregular rhythm present.   No murmur heard.  Pulses:       Radial pulses are 2+ on the right side, and 2+ on the left side.        Dorsalis pedis pulses are 2+ on the right side, and 2+ on the left side.        Posterior tibial pulses are 2+ on the right side, and 2+ on the left side.   Right radial cath site well healed without erythema or ecchymosis, palpable proximal and distal pulses, good capillary refill, good motor function of hand, no thrill detected, site is soft and non-tender with no hematoma, no sensory deficits noted.    No lower extremity edema noted   Pulmonary/Chest: Effort normal and breath sounds normal. No accessory muscle usage. No tachypnea. No respiratory distress. He has no decreased breath sounds. He has no wheezes. He has no rhonchi. He has no rales. He exhibits no tenderness.   Abdominal: Soft. Bowel sounds are normal. He exhibits no distension. There is no tenderness. There is no rebound and no guarding.   Rounded, obese.   Musculoskeletal: Normal range of motion. He exhibits no edema.   Neurological: He is alert and oriented to person, place, and time.   Skin: Skin is warm, dry and intact. He is not diaphoretic. No erythema.   Psychiatric: He has a normal mood and affect. His speech is normal and behavior is normal. Judgment and thought content normal. Cognition and memory are normal.   Nursing note and vitals reviewed.        ECG 12 Lead  Date/Time: 8/30/2019 1:39 PM  Performed by: Shira Curran APRN  Authorized by: Shira Curran APRN   Comparison: compared with previous ECG from 8/20/2019  Rhythm: atrial fibrillation  Rate: normal  Conduction: non-specific intraventricular conduction delay  Q waves: II, III and  aVF    ST segment elevation noted on lead: inferior ST elevations improved some.  T inversion: II, III and aVF  T flattening: V6, V5 and I  QRS axis: left    Clinical impression: abnormal EKG  Comments: Indication: Recent inferior STEMI, ischemic cardiomyopathy              Assessment:       Diagnosis Plan   1. Acute MI, inferoposterior wall (CMS/HCC)  ECG 12 Lead   2. Coronary artery disease involving native coronary artery of native heart, angina presence unspecified  ECG 12 Lead   3. S/P drug eluting coronary stent placement  ECG 12 Lead   4. Ventricular fibrillation (CMS/HCC)  ECG 12 Lead   5. Chronic a-fib (CMS/HCC)  ECG 12 Lead   6. Essential hypertension  ECG 12 Lead   7. Hyperlipidemia, unspecified hyperlipidemia type  ECG 12 Lead       Plan:     1. Coronary artery disease: Continue aspirin, plavix, statin and beta blocker.  Denies anginal symptoms.  We will get him started in cardiac rehab.   2. Inferolateral STEMI: s/p ROBY to RCA located by no reflow.  On beta-blockade.  3. Ischemic cardiomyopathy: EF 35%.  On Toprol-XL and lasix with potassium.  Losartan stopped in the hospital due to acute on chronic renal insufficiency.  He denies any heart failure symptoms.  4. Hypotension: History of hypertension but blood pressure was low in the hospital.  Blood pressure stable today.  He reports he has not had low blood pressure readings at home her systolic readings less than 100.  He did not bring a log with him today however.  5. Hyperlipidemia/hypertriglyceridemia: Was formally on TriCor and now on statin.  Recommended repeat labs in 6 to 12 weeks for repeat lipid panel as well as LFTs with PCP.  6.  Acute kidney injury: . To follow up with nephrology-as scheduled appointment September 19. Avoid NSAIDS.  Repeat labs with his PCP showed creatinine was in the normal limits at 1.1 and normal potassium 4.7.  7. Prediabetes: Defer to PCP.  8.  Ventricular fibrillation: Occurred in the ED he required 1 shock and  lidocaine. No recurrent.  9.  Leukocytosis: History of this dating back to 2018 as far as I can tell.  Reports he has had a work-up with hematology in the past.  10. Atrial fibrillation: On beta-blocker and dual antiplatelet therapy due to his CAD and recent STEMI.  He has never been on systemic anti-correlation as he has a history of hemorrhoidal bleeding.  Will defer this to Dr. Gomes to discuss in a coagulation in the future.    Atrial Fibrillation and Atrial Flutter  Assessment  • The patient's CHADS2-VASc score is 2  • A [id number] score of 2 or more is considered a high risk for a thromboembolic event    Plan  • Continue in atrial fibrillation with rate control  • Continue aspirin + clopidogrel for antithrombotic therapy, bleeding issues discussed  • Continue beta blocker for rate control    Coronary Artery Disease  Assessment  • The patient has no angina    Plan  • Lifestyle modifications discussed include adhering to a heart healthy diet, avoidance of tobacco products, maintenance of a healthy weight, medication compliance, regular exercise and regular monitoring of cholesterol and blood pressure    Subjective - Objective  • There is a history of past MI on or around 8/18/2019  • There has been a previous stent procedure using ROBY  on or around 8/18/2019 RCA   • Current antiplatelet therapy includes aspirin 81 mg and clopidogrel 75 mg  • The patient qualifies for cardiac rehabilitation, and has been referred to cardiac rehab      Heart Failure  Assessment  • NYHA class I - There is no limitation of physical activity. Physical activity does not cause fatigue, palpitations or shortness of breath.  • ACE inhibitor not prescribed for medical reasons  • Beta blocker prescribed  • Diuretics prescribed  • Angiotensin receptor blocker (ARB) not prescribed for medical reasons  • The most recent ejection fraction is 35%  • Left ventricular function is moderately reduced by qualitative assessment  • The left  ventricle was last assessed on 8/19/2019    Plan  • The patient has received heart failure education on the following topics: dietary sodium restriction, medication instructions, minimizing or avoiding NSAID use, symptom management, physical activity, weight monitoring and minimizing alcohol intake  • The heart failure care plan was discussed with the patient today including: up-titrating HF medications    Subjective/Objective    • Physical exam findings negative for rales, peripheral edema and elevated JVP.          I have reviewed cardiovascular disease risk factor modifications. I advised on the importance of good blood pressure, blood sugar and cholesterol control, as well as regular exercise and avoidance of tobacco for cardiovascular disease risk factor modification.  Also discussed weight monitoring and symptom monitoring for his cardiomyopathy.  Due to his acute kidney injury in the hospital and can avoid ACE inhibitor/ARB until he follows up with nephrology outpatient and I will have him discuss if they feel he is okay to start this for GDM T for cardiomyopathy.  In the meantime I am going to have him uptitrate his metoprolol succinate to 37.5 mg daily.  Patient requested 90-day supply of his medications be sent to the pharmacy.  I am not going to send new prescriptions of Lasix and potassium in case his dose adjustment change here in the future but I will send 90-day supply of his beta-blocker, Plavix and statin.    Follow up with Dr. Gomes on 10/1/19, unless otherwise needed sooner.  I advised the patient to contact our office with any questions or concerns.       It has been a pleasure to participate in this patient's care. Please feel free to contact me with any questions or concerns.     Shira Curran, BEATRICE  08/30/2019            Your medication list           Accurate as of 8/30/19  2:37 PM. If you have any questions, ask your nurse or doctor.               CONTINUE taking these medications       Instructions Last Dose Given Next Dose Due   aspirin 81 MG chewable tablet      Chew 81 mg Daily.       atorvastatin 40 MG tablet  Commonly known as:  LIPITOR      Take 1 tablet by mouth Every Night.       baclofen 10 MG tablet  Commonly known as:  LIORESAL      Take 10 mg by mouth 3 (Three) Times a Day.       clopidogrel 75 MG tablet  Commonly known as:  PLAVIX      Take 1 tablet by mouth Daily.       fluticasone 50 MCG/ACT nasal spray  Commonly known as:  FLONASE      2 sprays into the nostril(s) as directed by provider Daily As Needed for Rhinitis.       furosemide 20 MG tablet  Commonly known as:  LASIX      Take 1 tablet by mouth Daily.       HYDROcodone-acetaminophen  MG per tablet  Commonly known as:  NORCO      Take 1 tablet by mouth Every 6 (Six) Hours As Needed for Moderate Pain .       metoprolol succinate XL 25 MG 24 hr tablet  Commonly known as:  TOPROL-XL      Take 25 mg by mouth Daily.       NON FORMULARY      Apply 1 each topically to the appropriate area as directed Daily.       pantoprazole 40 MG EC tablet  Commonly known as:  PROTONIX      Take 40 mg by mouth Daily.       potassium chloride 10 MEQ CR tablet  Commonly known as:  K-DUR      Take 2 tablets by mouth Daily.       tamsulosin 0.4 MG capsule 24 hr capsule  Commonly known as:  FLOMAX      Take 1 capsule by mouth Every Night.          STOP taking these medications    NON FORMULARY  Stopped by:  BEATRICE Figueroa               The above medication changes may not have been made by this provider.  Medication list was updated to reflect medications patient is currently taking including medication changes and discontinuations made by other healthcare providers.     Dictated utilizing Dragon Dictation System.

## 2019-08-30 ENCOUNTER — OFFICE VISIT (OUTPATIENT)
Dept: CARDIOLOGY | Facility: CLINIC | Age: 64
End: 2019-08-30

## 2019-08-30 VITALS
DIASTOLIC BLOOD PRESSURE: 78 MMHG | HEIGHT: 70 IN | WEIGHT: 234.2 LBS | BODY MASS INDEX: 33.53 KG/M2 | HEART RATE: 97 BPM | OXYGEN SATURATION: 98 % | SYSTOLIC BLOOD PRESSURE: 110 MMHG

## 2019-08-30 DIAGNOSIS — I25.10 CORONARY ARTERY DISEASE INVOLVING NATIVE CORONARY ARTERY OF NATIVE HEART, ANGINA PRESENCE UNSPECIFIED: ICD-10-CM

## 2019-08-30 DIAGNOSIS — I48.20 CHRONIC A-FIB (HCC): ICD-10-CM

## 2019-08-30 DIAGNOSIS — I49.01 VENTRICULAR FIBRILLATION (HCC): ICD-10-CM

## 2019-08-30 DIAGNOSIS — E78.5 HYPERLIPIDEMIA, UNSPECIFIED HYPERLIPIDEMIA TYPE: ICD-10-CM

## 2019-08-30 DIAGNOSIS — Z95.5 S/P DRUG ELUTING CORONARY STENT PLACEMENT: ICD-10-CM

## 2019-08-30 DIAGNOSIS — I10 ESSENTIAL HYPERTENSION: ICD-10-CM

## 2019-08-30 DIAGNOSIS — I21.19 ACUTE MI, INFEROPOSTERIOR WALL (HCC): Primary | ICD-10-CM

## 2019-08-30 PROCEDURE — 93000 ELECTROCARDIOGRAM COMPLETE: CPT | Performed by: NURSE PRACTITIONER

## 2019-08-30 PROCEDURE — 99214 OFFICE O/P EST MOD 30 MIN: CPT | Performed by: NURSE PRACTITIONER

## 2019-08-30 RX ORDER — METOPROLOL SUCCINATE 25 MG/1
37.5 TABLET, EXTENDED RELEASE ORAL DAILY
Qty: 135 TABLET | Refills: 1 | Status: SHIPPED | OUTPATIENT
Start: 2019-08-30 | End: 2020-03-23

## 2019-08-30 RX ORDER — CLOPIDOGREL BISULFATE 75 MG/1
75 TABLET ORAL DAILY
Qty: 90 TABLET | Refills: 1 | Status: SHIPPED | OUTPATIENT
Start: 2019-08-30 | End: 2020-02-03

## 2019-08-30 RX ORDER — ATORVASTATIN CALCIUM 40 MG/1
40 TABLET, FILM COATED ORAL NIGHTLY
Qty: 90 TABLET | Refills: 1 | Status: SHIPPED | OUTPATIENT
Start: 2019-08-30 | End: 2020-02-03

## 2019-09-03 ENCOUNTER — READMISSION MANAGEMENT (OUTPATIENT)
Dept: CALL CENTER | Facility: HOSPITAL | Age: 64
End: 2019-09-03

## 2019-09-03 NOTE — OUTREACH NOTE
AMI Week 2 Survey      Responses   Facility patient discharged from?  [facility]   Does the patient have one of the following disease processes/diagnoses(primary or secondary)?  Acute MI (STEMI,NSTEMI)   Week 2 attempt successful?  Yes   Call start time  1557   Call end time  1606   Discharge diagnosis  Acute MI   Meds reviewed with patient/caregiver?  Yes   Is the patient having any side effects they believe may be caused by any medication additions or changes?  No   Is the patient taking all medications as directed (includes completed medication regime)?  Yes   Does the patient have a primary care provider?   Yes   Has the patient kept scheduled appointments due by today?  Yes   Psychosocial issues?  No   Comments  Still having SOA with exertion has been better. [facility] site right radial, better.    What is the patient's perception of their health status since discharge?  Returned to baseline/stable   Is the patient/caregiver able to teach back signs and symptoms of when to call for help immediately:  Sudden chest discomfort, Shortness of breath at any time, Nausea or vomiting, Sudden sweating or clammy skin   Is the patient/caregiver able to teach back lifestyle changes to help prevent MIs  Maintaining a healthy weight, Regular exercise as approved by provider, Reducing stress   Is the patient/caregiver able to teach back ways to prevent a second heart attack:  Take medications, Manage risk factors   Is the patient/caregiver able to teach back the hierarchy of who to call/visit for symptoms/problems? PCP, Specialist, Home health nurse, Urgent Care, ED, 911  Yes   Week 2 call completed?  Yes   Revoked  No further contact(revokes)-requires comment   Graduated/Revoked comments  better than baseline per his report.           Libertad Arriaga RN

## 2019-09-04 ENCOUNTER — TELEPHONE (OUTPATIENT)
Dept: CARDIOLOGY | Facility: CLINIC | Age: 64
End: 2019-09-04

## 2019-09-04 NOTE — TELEPHONE ENCOUNTER
pts return to work certification is completed, pt will  today, made 2 copies one for scanning and one to go with the original paper work trg

## 2019-09-10 ENCOUNTER — TELEPHONE (OUTPATIENT)
Dept: CARDIAC REHAB | Facility: HOSPITAL | Age: 64
End: 2019-09-10

## 2019-09-10 NOTE — TELEPHONE ENCOUNTER
Called pt to reschedule his IA to start phase II cardiac rehab.  We have not been able to get authorization from insurance for pt to attend program.  He has been rescheduled for Sept 17 at 1:30 pm.

## 2019-09-11 ENCOUNTER — APPOINTMENT (OUTPATIENT)
Dept: CARDIAC REHAB | Facility: HOSPITAL | Age: 64
End: 2019-09-11

## 2019-09-20 ENCOUNTER — TREATMENT (OUTPATIENT)
Dept: CARDIAC REHAB | Facility: HOSPITAL | Age: 64
End: 2019-09-20

## 2019-09-20 DIAGNOSIS — Z95.5 S/P DRUG ELUTING CORONARY STENT PLACEMENT: ICD-10-CM

## 2019-09-20 DIAGNOSIS — I21.29 ACUTE MYOCARDIAL INFARCTION INVOLVING OTHER CORONARY ARTERY, UNSPECIFIED MI TYPE (HCC): Primary | ICD-10-CM

## 2019-09-20 PROCEDURE — 93798 PHYS/QHP OP CAR RHAB W/ECG: CPT

## 2019-09-23 ENCOUNTER — TREATMENT (OUTPATIENT)
Dept: CARDIAC REHAB | Facility: HOSPITAL | Age: 64
End: 2019-09-23

## 2019-09-23 DIAGNOSIS — I21.29 ACUTE MYOCARDIAL INFARCTION INVOLVING OTHER CORONARY ARTERY, UNSPECIFIED MI TYPE (HCC): Primary | ICD-10-CM

## 2019-09-23 DIAGNOSIS — Z95.5 S/P DRUG ELUTING CORONARY STENT PLACEMENT: ICD-10-CM

## 2019-09-23 PROCEDURE — 93798 PHYS/QHP OP CAR RHAB W/ECG: CPT

## 2019-09-25 ENCOUNTER — TREATMENT (OUTPATIENT)
Dept: CARDIAC REHAB | Facility: HOSPITAL | Age: 64
End: 2019-09-25

## 2019-09-25 DIAGNOSIS — I21.29 ACUTE MYOCARDIAL INFARCTION INVOLVING OTHER CORONARY ARTERY, UNSPECIFIED MI TYPE (HCC): Primary | ICD-10-CM

## 2019-09-25 PROCEDURE — 93798 PHYS/QHP OP CAR RHAB W/ECG: CPT

## 2019-09-27 ENCOUNTER — TREATMENT (OUTPATIENT)
Dept: CARDIAC REHAB | Facility: HOSPITAL | Age: 64
End: 2019-09-27

## 2019-09-27 DIAGNOSIS — I21.29 ACUTE MYOCARDIAL INFARCTION INVOLVING OTHER CORONARY ARTERY, UNSPECIFIED MI TYPE (HCC): Primary | ICD-10-CM

## 2019-09-27 PROCEDURE — 93798 PHYS/QHP OP CAR RHAB W/ECG: CPT

## 2019-09-30 ENCOUNTER — TREATMENT (OUTPATIENT)
Dept: CARDIAC REHAB | Facility: HOSPITAL | Age: 64
End: 2019-09-30

## 2019-09-30 DIAGNOSIS — I21.29 ACUTE MYOCARDIAL INFARCTION INVOLVING OTHER CORONARY ARTERY, UNSPECIFIED MI TYPE (HCC): Primary | ICD-10-CM

## 2019-09-30 PROCEDURE — 93798 PHYS/QHP OP CAR RHAB W/ECG: CPT

## 2019-10-01 ENCOUNTER — OFFICE VISIT (OUTPATIENT)
Dept: CARDIOLOGY | Facility: CLINIC | Age: 64
End: 2019-10-01

## 2019-10-01 VITALS
DIASTOLIC BLOOD PRESSURE: 74 MMHG | HEART RATE: 88 BPM | HEIGHT: 70 IN | RESPIRATION RATE: 18 BRPM | WEIGHT: 232.4 LBS | OXYGEN SATURATION: 94 % | SYSTOLIC BLOOD PRESSURE: 124 MMHG | BODY MASS INDEX: 33.27 KG/M2

## 2019-10-01 DIAGNOSIS — E78.5 HYPERLIPIDEMIA, UNSPECIFIED HYPERLIPIDEMIA TYPE: ICD-10-CM

## 2019-10-01 DIAGNOSIS — I48.20 CHRONIC A-FIB (HCC): ICD-10-CM

## 2019-10-01 DIAGNOSIS — I25.10 CORONARY ARTERY DISEASE INVOLVING NATIVE CORONARY ARTERY OF NATIVE HEART WITHOUT ANGINA PECTORIS: Primary | ICD-10-CM

## 2019-10-01 DIAGNOSIS — I10 ESSENTIAL HYPERTENSION: ICD-10-CM

## 2019-10-01 DIAGNOSIS — I25.5 ISCHEMIC CARDIOMYOPATHY: ICD-10-CM

## 2019-10-01 PROCEDURE — 93000 ELECTROCARDIOGRAM COMPLETE: CPT | Performed by: NURSE PRACTITIONER

## 2019-10-01 PROCEDURE — 99214 OFFICE O/P EST MOD 30 MIN: CPT | Performed by: NURSE PRACTITIONER

## 2019-10-01 RX ORDER — LISINOPRIL 5 MG/1
5 TABLET ORAL DAILY
COMMUNITY
Start: 2019-09-19 | End: 2020-01-07

## 2019-10-01 NOTE — PROGRESS NOTES
Date of Office Visit: 10/01/2019  Encounter Provider: BEATRICE Truong  Place of Service: [facility] CARDIOLOGY  Patient Name: Frank Patel  :1955    Chief Complaint   Patient presents with   • Coronary Artery Disease     HOSP FOLLOW UP   :     HPI: Frank Patel is a 64 y.o. male, new to me, who presents today for follow-up.  Old records have been obtained and reviewed by me.  He is a patient with a past cardiac history significant for atrial fibrillation who presented to the ED on 2019 with substernal chest pain.  He was found to have an acute inferolateral and anterior STEMI.  He did have some ventricular fibrillation and was shocked x 1.  He was subsequently taken to the cardiac catheterization lab which revealed 10% luminal irregularities in the left main, 50% proximal LAD, 20 to 30% first diagonal, normal distal and mid LAD, 30% proximal RCA, and a 100% occlusion proximally above the RV branch.  He underwent drug-eluting stent placement to the proximal RCA.  This was complicated by distal no reflow.  Echocardiogram the following day revealed moderately decreased LV systolic function with an EF of 36-40% and no significant valvular abnormalities.  The patient developed acute renal insufficiency and the losartan had to be stopped.  Nephrology was consulted and his renal function improved to baseline.  He was started on DAPT with aspirin and Plavix, atorvastatin, Lasix, and potassium.  Of note, he has never been on anticoagulation due to issues with hemorrhoidal bleeding.   He then followed up in our office for BEATRICE Thompson on 2019.  At that visit he was doing well with no complaints of angina or heart failure.  His Toprol was increased to 37.5 mg daily.  He was not started on an ACE inhibitor or angiotensin receptor blocker due to the acute kidney injury and was advised to follow-up with nephrology for their opinion.  I am seeing him today for a  one-month follow-up.   Over the past month, he has been doing well from a cardiac standpoint.  He denies any chest pain, shortness of air, palpitations, edema, dizziness, or syncope.  He denies any PND or orthopnea.  He denies any bleeding difficulties.  He has been participating in cardiac rehab 3 days a week with no difficulty.  He works nights at [employer] which keeps him rather busy.  He is trying to make dietary changes.  He did follow up with Dr. Layne on 9/19/2019.  Dr. Layne started him on lisinopril 5 mg daily.  He has been tolerating this well and will return to Dr. Rosa's office for repeat lab work.  His blood pressures at home have been stable in the 110s to 120s systolic.    Past Medical History:   Diagnosis Date   • Acute MI, inferoposterior wall (CMS/HCC)    • Atrial fibrillation (CMS/HCC)    • Elevated cholesterol    • GERD (gastroesophageal reflux disease)    • Head trauma 2007   • HTN (hypertension)    • Kidney stone    • Muscle spasm    • Ventricular fibrillation (CMS/HCC)        Past Surgical History:   Procedure Laterality Date   • APPENDECTOMY     • CARDIAC CATHETERIZATION N/A 8/18/2019    Procedure: Left Heart Cath;  Surgeon: Rod Gomes MD;  Location: [facility] CATH INVASIVE LOCATION;  Service: Cardiology   • CARDIAC CATHETERIZATION N/A 8/18/2019    Procedure: Stent ROBY coronary;  Surgeon: Rod Gomes MD;  [facility];  Service: Cardiology   • CARDIAC CATHETERIZATION N/A 8/18/2019    Procedure: Coronary angiography;  Surgeon: Rod Gomes MD;  Location: [facility] CATH INVASIVE LOCATION;  Service: Cardiology   • CARDIAC CATHETERIZATION N/A 8/18/2019    Procedure: Left ventriculography;  Surgeon: Rod Gomes MD;  Location: [facility] CATH INVASIVE LOCATION;  Service: Cardiology   • FEMUR FRACTURE SURGERY Right 1990   • HAND SURGERY Right    • SPLENECTOMY  2007       Social History     Socioeconomic History   • Marital status:      Spouse name: Not  on file   • Number of children: Not on file   • Years of education: Not on file   • Highest education level: Not on file   Tobacco Use   • Smoking status: Former Smoker     Types: Cigarettes     Last attempt to quit: 1999     Years since quittin.1   • Smokeless tobacco: Never Used   • Tobacco comment: CAFFEINE USE: 1-16OZ COKE DAILY   Substance and Sexual Activity   • Alcohol use: Yes     Alcohol/week: 1.8 oz     Types: 3 Cans of beer per week     Comment: OCCASSIONALLY   • Drug use: Defer   • Sexual activity: Defer       Family History   Problem Relation Age of Onset   • Heart failure Mother        Review of Systems   Constitution: Negative for chills, fever and malaise/fatigue.   Cardiovascular: Negative for chest pain, dyspnea on exertion, leg swelling, near-syncope, orthopnea, palpitations, paroxysmal nocturnal dyspnea and syncope.   Respiratory: Negative for cough and shortness of breath.    Musculoskeletal: Negative for joint pain, joint swelling and myalgias.   Gastrointestinal: Negative for abdominal pain, diarrhea, melena, nausea and vomiting.   Genitourinary: Negative for frequency and hematuria.   Neurological: Negative for light-headedness, numbness, paresthesias and seizures.   Allergic/Immunologic: Negative.    All other systems reviewed and are negative.      Allergies   Allergen Reactions   • Topiramate Other (See Comments)     irritability         Current Outpatient Medications:   •  aspirin 81 MG chewable tablet, Chew 81 mg Daily., Disp: , Rfl:   •  atorvastatin (LIPITOR) 40 MG tablet, Take 1 tablet by mouth Every Night., Disp: 90 tablet, Rfl: 1  •  baclofen (LIORESAL) 10 MG tablet, Take 10 mg by mouth 3 (Three) Times a Day., Disp: , Rfl:   •  clopidogrel (PLAVIX) 75 MG tablet, Take 1 tablet by mouth Daily., Disp: 90 tablet, Rfl: 1  •  fluticasone (FLONASE) 50 MCG/ACT nasal spray, 2 sprays into the nostril(s) as directed by provider Daily As Needed for Rhinitis., Disp: , Rfl:   •   "furosemide (LASIX) 20 MG tablet, Take 1 tablet by mouth Daily., Disp: 30 tablet, Rfl: 6  •  HYDROcodone-acetaminophen (NORCO)  MG per tablet, Take 1 tablet by mouth Every 6 (Six) Hours As Needed for Moderate Pain ., Disp: , Rfl:   •  lisinopril (PRINIVIL,ZESTRIL) 5 MG tablet, Take 5 mg by mouth Daily., Disp: , Rfl:   •  metoprolol succinate XL (TOPROL-XL) 25 MG 24 hr tablet, Take 1.5 tablets by mouth Daily., Disp: 135 tablet, Rfl: 1  •  NON FORMULARY, Apply 1 each topically to the appropriate area as directed Daily., Disp: , Rfl:   •  pantoprazole (PROTONIX) 40 MG EC tablet, Take 40 mg by mouth Daily., Disp: , Rfl:   •  potassium chloride (K-DUR) 10 MEQ CR tablet, Take 2 tablets by mouth Daily., Disp: 60 tablet, Rfl: 11  •  tamsulosin (FLOMAX) 0.4 MG capsule 24 hr capsule, Take 1 capsule by mouth Every Night., Disp: , Rfl:       Objective:     Vitals:    10/01/19 1426 10/01/19 1429   BP: 126/78 124/74   BP Location: Left arm Right arm   Patient Position: Sitting Sitting   Pulse: 88    Resp: 18    SpO2: 94%    Weight: 105 kg (232 lb 6.4 oz)    Height: 177.8 cm (70\")      Body mass index is 33.35 kg/m².    PHYSICAL EXAM:    Physical Exam   Constitutional: He is oriented to person, place, and time. He appears well-developed and well-nourished. No distress.   HENT:   Head: Normocephalic and atraumatic.   Eyes: Pupils are equal, round, and reactive to light.   Neck: No JVD present. No thyromegaly present.   Cardiovascular: Normal rate, normal heart sounds and intact distal pulses. An irregularly irregular rhythm present.   No murmur heard.  Pulmonary/Chest: Effort normal and breath sounds normal. No respiratory distress.   Abdominal: Soft. Bowel sounds are normal. He exhibits no distension. There is no splenomegaly or hepatomegaly. There is no tenderness.   Musculoskeletal: Normal range of motion. He exhibits no edema.   Neurological: He is alert and oriented to person, place, and time.   Skin: Skin is warm and " dry. He is not diaphoretic. No erythema.   Psychiatric: He has a normal mood and affect. His behavior is normal. Judgment normal.         ECG 12 Lead  Date/Time: 10/1/2019 2:36 PM  Performed by: Justine Wallace APRN  Authorized by: Justine Wallace APRN   Comparison: compared with previous ECG from 8/20/2019  Similar to previous ECG  Rhythm: atrial fibrillation  Rate: normal  BPM: 83  Conduction: non-specific intraventricular conduction delay  T inversion: II, III and aVF    Clinical impression: abnormal EKG  Comments: Indication: CAD  T wave inversions unchanged from prior              Assessment:       Diagnosis Plan   1. Coronary artery disease involving native coronary artery of native heart without angina pectoris  ECG 12 Lead   2. Ischemic cardiomyopathy     3. Essential hypertension     4. Hyperlipidemia, unspecified hyperlipidemia type     5. Chronic a-fib       Orders Placed This Encounter   Procedures   • ECG 12 Lead     This order was created via procedure documentation          Plan:       1. Coronary Artery Disease  Plan  • Lifestyle modifications discussed include adhering to a heart healthy diet, medication compliance, regular exercise and regular monitoring of cholesterol and blood pressure    Subjective - Objective  • There is a history of past MI  • There has been a previous stent procedure using ROBY  • Current antiplatelet therapy includes aspirin 81 mg and clopidogrel 75 mg  • The patient qualifies for cardiac rehabilitation, and is already participating in a cardiac rehab program  • He denies any symptoms of angina.  He remains on good medical therapy including aspirin, Plavix, atorvastatin, and Toprol.      2. Atrial Fibrillation and Atrial Flutter  Assessment  • The patient has persistent atrial fibrillation  • This is non-valvular in etiology  • The patient's CHADS2-VASc score is 1  • A [id number] score of 1 is considered an intermediate risk for a thromboembolic event    Plan  •  Continue in atrial fibrillation with rate control  • Continue aspirin + clopidogrel for antithrombotic therapy, bleeding issues discussed  • Continue beta blocker for rate control    Subjective - Objective  • He remains in atrial fibrillation with a controlled rate.  He has never been on anticoagulation due to a history of severe hemorrhoidal bleeding.  He is on aspirin and Plavix.  He is rate controlled on Toprol therapy.        3.  Ischemic cardiomyopathy.  EF 36-40% on most recent echocardiogram.  He is optimized on medical therapy including Toprol, lisinopril, and Lasix.  He should have a repeat echocardiogram in 2 months to reassess his LV function.      4. Hyperlipidemia.  Lipid panel from 8/19/2019 revealed a total cholesterol of 186, LDL of 76, and HDL of 31.  He was started on atorvastatin 40 mg.  He will need a repeat lipid panel in two months.       5. Hypertension.  His blood pressure today looks great.  Continue current regimen.      Overall I think he is doing well from a cardiac standpoint.  He denies any symptoms of angina or heart failure.  I think he is really trying to modify his risk factors.  I discussed with him the signs and symptoms of heart failure and encouraged him to let us know should any of the symptoms develop.  I am not going to make any changes today.  I am going to discuss follow-up with Dr. Gomes as well as when to repeat the echocardiogram and will touch base with the patient at that time.       Addendum 10/2/2019:  I spoke with Dr. Gomes.  Per his recommendation, I am going to schedule the patient for a repeat echocardiogram.  I am also going to stop his aspirin.  He will remain on Plavix.  He will follow-up with Dr. Gomes in January or sooner if needed.  I spoke to the patient and he verbalized understanding.      As always, it has been a pleasure to participate in your patient's care.      Sincerely,         BEATRICE Jaffe

## 2019-10-02 ENCOUNTER — TREATMENT (OUTPATIENT)
Dept: CARDIAC REHAB | Facility: HOSPITAL | Age: 64
End: 2019-10-02

## 2019-10-02 DIAGNOSIS — I21.29 ACUTE MYOCARDIAL INFARCTION INVOLVING OTHER CORONARY ARTERY, UNSPECIFIED MI TYPE (HCC): Primary | ICD-10-CM

## 2019-10-02 PROCEDURE — 93798 PHYS/QHP OP CAR RHAB W/ECG: CPT

## 2019-10-04 ENCOUNTER — TREATMENT (OUTPATIENT)
Dept: CARDIAC REHAB | Facility: HOSPITAL | Age: 64
End: 2019-10-04

## 2019-10-04 DIAGNOSIS — I21.29 ACUTE MYOCARDIAL INFARCTION INVOLVING OTHER CORONARY ARTERY, UNSPECIFIED MI TYPE (HCC): Primary | ICD-10-CM

## 2019-10-04 PROCEDURE — 93798 PHYS/QHP OP CAR RHAB W/ECG: CPT

## 2019-10-07 ENCOUNTER — TREATMENT (OUTPATIENT)
Dept: CARDIAC REHAB | Facility: HOSPITAL | Age: 64
End: 2019-10-07

## 2019-10-07 DIAGNOSIS — I21.29 ACUTE MYOCARDIAL INFARCTION INVOLVING OTHER CORONARY ARTERY, UNSPECIFIED MI TYPE (HCC): Primary | ICD-10-CM

## 2019-10-07 PROCEDURE — 93798 PHYS/QHP OP CAR RHAB W/ECG: CPT

## 2019-10-09 ENCOUNTER — TREATMENT (OUTPATIENT)
Dept: CARDIAC REHAB | Facility: HOSPITAL | Age: 64
End: 2019-10-09

## 2019-10-09 DIAGNOSIS — I21.29 ACUTE MYOCARDIAL INFARCTION INVOLVING OTHER CORONARY ARTERY, UNSPECIFIED MI TYPE (HCC): Primary | ICD-10-CM

## 2019-10-09 PROCEDURE — 93798 PHYS/QHP OP CAR RHAB W/ECG: CPT

## 2019-10-11 ENCOUNTER — TREATMENT (OUTPATIENT)
Dept: CARDIAC REHAB | Facility: HOSPITAL | Age: 64
End: 2019-10-11

## 2019-10-11 DIAGNOSIS — I21.29 ACUTE MYOCARDIAL INFARCTION INVOLVING OTHER CORONARY ARTERY, UNSPECIFIED MI TYPE (HCC): Primary | ICD-10-CM

## 2019-10-11 PROCEDURE — 93798 PHYS/QHP OP CAR RHAB W/ECG: CPT

## 2019-10-14 ENCOUNTER — TREATMENT (OUTPATIENT)
Dept: CARDIAC REHAB | Facility: HOSPITAL | Age: 64
End: 2019-10-14

## 2019-10-14 DIAGNOSIS — I21.29 ACUTE MYOCARDIAL INFARCTION INVOLVING OTHER CORONARY ARTERY, UNSPECIFIED MI TYPE (HCC): Primary | ICD-10-CM

## 2019-10-14 PROCEDURE — 93798 PHYS/QHP OP CAR RHAB W/ECG: CPT

## 2019-10-18 ENCOUNTER — TREATMENT (OUTPATIENT)
Dept: CARDIAC REHAB | Facility: HOSPITAL | Age: 64
End: 2019-10-18

## 2019-10-18 DIAGNOSIS — I21.29 ACUTE MYOCARDIAL INFARCTION INVOLVING OTHER CORONARY ARTERY, UNSPECIFIED MI TYPE (HCC): Primary | ICD-10-CM

## 2019-10-18 PROCEDURE — 93798 PHYS/QHP OP CAR RHAB W/ECG: CPT

## 2019-10-21 ENCOUNTER — TREATMENT (OUTPATIENT)
Dept: CARDIAC REHAB | Facility: HOSPITAL | Age: 64
End: 2019-10-21

## 2019-10-21 DIAGNOSIS — I21.29 ACUTE MYOCARDIAL INFARCTION INVOLVING OTHER CORONARY ARTERY, UNSPECIFIED MI TYPE (HCC): Primary | ICD-10-CM

## 2019-10-21 PROCEDURE — 93798 PHYS/QHP OP CAR RHAB W/ECG: CPT

## 2019-10-23 ENCOUNTER — TREATMENT (OUTPATIENT)
Dept: CARDIAC REHAB | Facility: HOSPITAL | Age: 64
End: 2019-10-23

## 2019-10-23 DIAGNOSIS — I21.29 ACUTE MYOCARDIAL INFARCTION INVOLVING OTHER CORONARY ARTERY, UNSPECIFIED MI TYPE (HCC): Primary | ICD-10-CM

## 2019-10-23 PROCEDURE — 93798 PHYS/QHP OP CAR RHAB W/ECG: CPT

## 2019-10-25 ENCOUNTER — TREATMENT (OUTPATIENT)
Dept: CARDIAC REHAB | Facility: HOSPITAL | Age: 64
End: 2019-10-25

## 2019-10-25 DIAGNOSIS — I21.29 ACUTE MYOCARDIAL INFARCTION INVOLVING OTHER CORONARY ARTERY, UNSPECIFIED MI TYPE (HCC): Primary | ICD-10-CM

## 2019-10-25 PROCEDURE — 93798 PHYS/QHP OP CAR RHAB W/ECG: CPT

## 2019-10-28 ENCOUNTER — TREATMENT (OUTPATIENT)
Dept: CARDIAC REHAB | Facility: HOSPITAL | Age: 64
End: 2019-10-28

## 2019-10-28 ENCOUNTER — TELEPHONE (OUTPATIENT)
Dept: CARDIOLOGY | Facility: CLINIC | Age: 64
End: 2019-10-28

## 2019-10-28 DIAGNOSIS — I21.29 ACUTE MYOCARDIAL INFARCTION INVOLVING OTHER CORONARY ARTERY, UNSPECIFIED MI TYPE (HCC): Primary | ICD-10-CM

## 2019-10-28 PROCEDURE — 93798 PHYS/QHP OP CAR RHAB W/ECG: CPT

## 2019-10-28 NOTE — TELEPHONE ENCOUNTER
Frank had his teeth cleaned today.  The [facility] didn't know he had 2 stents in Aug  So after his cleaning he gave him a prescription for amoxil to go ahead and take  He also said the patient needs a filling  Wanted to know if this should wait or is this ok to go ahead and do  If so should he be pre-medicated prior to the filling    Dr. Edgardo Koo  [phone]

## 2019-10-28 NOTE — TELEPHONE ENCOUNTER
I spoke with Dr. Koo. The patient does not need to be premedicated prior to cleanings or fillings.  He cannot come off of his asa and plavix, so long as they are okay performing procedures while on these medications.

## 2019-10-30 ENCOUNTER — TREATMENT (OUTPATIENT)
Dept: CARDIAC REHAB | Facility: HOSPITAL | Age: 64
End: 2019-10-30

## 2019-10-30 DIAGNOSIS — I21.29 ACUTE MYOCARDIAL INFARCTION INVOLVING OTHER CORONARY ARTERY, UNSPECIFIED MI TYPE (HCC): Primary | ICD-10-CM

## 2019-10-30 PROCEDURE — 93798 PHYS/QHP OP CAR RHAB W/ECG: CPT

## 2019-11-01 ENCOUNTER — TREATMENT (OUTPATIENT)
Dept: CARDIAC REHAB | Facility: HOSPITAL | Age: 64
End: 2019-11-01

## 2019-11-01 DIAGNOSIS — I21.29 ACUTE MYOCARDIAL INFARCTION INVOLVING OTHER CORONARY ARTERY, UNSPECIFIED MI TYPE (HCC): Primary | ICD-10-CM

## 2019-11-01 PROCEDURE — 93798 PHYS/QHP OP CAR RHAB W/ECG: CPT

## 2019-11-04 ENCOUNTER — APPOINTMENT (OUTPATIENT)
Dept: CARDIAC REHAB | Facility: HOSPITAL | Age: 64
End: 2019-11-04

## 2019-11-13 ENCOUNTER — HOSPITAL ENCOUNTER (OUTPATIENT)
Dept: CARDIOLOGY | Facility: HOSPITAL | Age: 64
Discharge: HOME | End: 2019-11-13
Admitting: NURSE PRACTITIONER

## 2019-11-13 VITALS
BODY MASS INDEX: 33.21 KG/M2 | WEIGHT: 232 LBS | OXYGEN SATURATION: 99 % | DIASTOLIC BLOOD PRESSURE: 80 MMHG | HEIGHT: 70 IN | SYSTOLIC BLOOD PRESSURE: 118 MMHG | HEART RATE: 60 BPM

## 2019-11-13 DIAGNOSIS — I25.5 ISCHEMIC CARDIOMYOPATHY: ICD-10-CM

## 2019-11-13 PROCEDURE — 93308 TTE F-UP OR LMTD: CPT

## 2019-11-13 PROCEDURE — 93325 DOPPLER ECHO COLOR FLOW MAPG: CPT | Performed by: INTERNAL MEDICINE

## 2019-11-13 PROCEDURE — 93308 TTE F-UP OR LMTD: CPT | Performed by: INTERNAL MEDICINE

## 2019-11-13 PROCEDURE — 93321 DOPPLER ECHO F-UP/LMTD STD: CPT | Performed by: INTERNAL MEDICINE

## 2019-11-13 PROCEDURE — 93321 DOPPLER ECHO F-UP/LMTD STD: CPT

## 2019-11-13 PROCEDURE — 93325 DOPPLER ECHO COLOR FLOW MAPG: CPT

## 2019-11-13 PROCEDURE — 25010000002 PERFLUTREN (DEFINITY) 8.476 MG IN SODIUM CHLORIDE 0.9 % 10 ML INJECTION: Performed by: NURSE PRACTITIONER

## 2019-11-13 RX ADMIN — PERFLUTREN 1.5 ML: 6.52 INJECTION, SUSPENSION INTRAVENOUS at 16:20

## 2019-11-14 LAB
BH CV ECHO MEAS - ASC AORTA: 3.2 CM
BH CV ECHO MEAS - BSA(HAYCOCK): 2.3 M^2
BH CV ECHO MEAS - BSA: 2.2 M^2
BH CV ECHO MEAS - BZI_BMI: 33.3 KILOGRAMS/M^2
BH CV ECHO MEAS - BZI_METRIC_HEIGHT: 177.8 CM
BH CV ECHO MEAS - BZI_METRIC_WEIGHT: 105.2 KG
BH CV ECHO MEAS - EDV(MOD-SP2): 155 ML
BH CV ECHO MEAS - EDV(MOD-SP4): 155 ML
BH CV ECHO MEAS - EDV(TEICH): 138.7 ML
BH CV ECHO MEAS - EF(CUBED): 53.4 %
BH CV ECHO MEAS - EF(MOD-BP): 52 %
BH CV ECHO MEAS - EF(MOD-SP2): 46.5 %
BH CV ECHO MEAS - EF(MOD-SP4): 57.4 %
BH CV ECHO MEAS - EF(TEICH): 44.8 %
BH CV ECHO MEAS - ESV(MOD-SP2): 83 ML
BH CV ECHO MEAS - ESV(MOD-SP4): 66 ML
BH CV ECHO MEAS - ESV(TEICH): 76.6 ML
BH CV ECHO MEAS - FS: 22.4 %
BH CV ECHO MEAS - IVS/LVPW: 1.1
BH CV ECHO MEAS - IVSD: 1.1 CM
BH CV ECHO MEAS - LV DIASTOLIC VOL/BSA (35-75): 69.7 ML/M^2
BH CV ECHO MEAS - LV MASS(C)D: 212.2 GRAMS
BH CV ECHO MEAS - LV MASS(C)DI: 95.4 GRAMS/M^2
BH CV ECHO MEAS - LV SYSTOLIC VOL/BSA (12-30): 29.7 ML/M^2
BH CV ECHO MEAS - LVIDD: 5.4 CM
BH CV ECHO MEAS - LVIDS: 4.2 CM
BH CV ECHO MEAS - LVLD AP2: 8.1 CM
BH CV ECHO MEAS - LVLD AP4: 8.1 CM
BH CV ECHO MEAS - LVLS AP2: 7.3 CM
BH CV ECHO MEAS - LVLS AP4: 7.4 CM
BH CV ECHO MEAS - LVPWD: 0.97 CM
BH CV ECHO MEAS - SI(CUBED): 36.9 ML/M^2
BH CV ECHO MEAS - SI(MOD-SP2): 32.4 ML/M^2
BH CV ECHO MEAS - SI(MOD-SP4): 40 ML/M^2
BH CV ECHO MEAS - SI(TEICH): 28 ML/M^2
BH CV ECHO MEAS - SV(CUBED): 82 ML
BH CV ECHO MEAS - SV(MOD-SP2): 72 ML
BH CV ECHO MEAS - SV(MOD-SP4): 89 ML
BH CV ECHO MEAS - SV(TEICH): 62.2 ML
LEFT ATRIUM VOLUME INDEX: 43 ML/M2
LV EF 2D ECHO EST: 52 %
MAXIMAL PREDICTED HEART RATE: 158 BPM
STRESS TARGET HR: 134 BPM

## 2019-11-15 ENCOUNTER — TELEPHONE (OUTPATIENT)
Dept: CARDIOLOGY | Facility: CLINIC | Age: 64
End: 2019-11-15

## 2020-01-07 ENCOUNTER — OFFICE VISIT (OUTPATIENT)
Dept: CARDIOLOGY | Facility: CLINIC | Age: 65
End: 2020-01-07

## 2020-01-07 VITALS
HEART RATE: 82 BPM | HEIGHT: 70 IN | BODY MASS INDEX: 32.93 KG/M2 | SYSTOLIC BLOOD PRESSURE: 148 MMHG | WEIGHT: 230 LBS | DIASTOLIC BLOOD PRESSURE: 96 MMHG

## 2020-01-07 DIAGNOSIS — I21.19 ACUTE MI, INFEROPOSTERIOR WALL (HCC): ICD-10-CM

## 2020-01-07 DIAGNOSIS — K64.8 INTERNAL HEMORRHOID: ICD-10-CM

## 2020-01-07 DIAGNOSIS — I49.01 VENTRICULAR FIBRILLATION (HCC): Primary | ICD-10-CM

## 2020-01-07 DIAGNOSIS — I48.20 CHRONIC A-FIB (HCC): ICD-10-CM

## 2020-01-07 PROCEDURE — 93000 ELECTROCARDIOGRAM COMPLETE: CPT | Performed by: INTERNAL MEDICINE

## 2020-01-07 PROCEDURE — 99214 OFFICE O/P EST MOD 30 MIN: CPT | Performed by: INTERNAL MEDICINE

## 2020-01-07 RX ORDER — LISINOPRIL 20 MG/1
20 TABLET ORAL DAILY
Qty: 90 TABLET | Refills: 3 | Status: SHIPPED | OUTPATIENT
Start: 2020-01-07 | End: 2020-02-18 | Stop reason: ALTCHOICE

## 2020-01-07 NOTE — PROGRESS NOTES
Date of Office Visit: 20  Encounter Provider: Rod Gomes MD  Place of Service: [facility] CARDIOLOGY  Patient Name: Frank Patel  :1955  [id number]    Chief Complaint   Patient presents with   • Coronary Artery Disease   • Acute MI   :     HPI: Frank Patel is a 64 y.o. male gentleman that had an inferior MI complicated by severe no reflow very large RCA stented did not have a lot of other disease in his other coronaries LV function at the time of the MI was about 35% follow-up echo was improved but not normal I would say it was about 40% ejection fraction I do not I think the overestimated how well it is.  He has been doing okay but recently is had a little bit of shortness of breath occasionally and has noticed his blood pressures been high since the holiday.    Past Medical History:   Diagnosis Date   • Acute MI, inferoposterior wall (CMS/HCC)    • Atrial fibrillation (CMS/HCC)    • Elevated cholesterol    • GERD (gastroesophageal reflux disease)    • Head trauma    • HTN (hypertension)    • Kidney stone    • Muscle spasm    • Ventricular fibrillation (CMS/HCC)        Past Surgical History:   Procedure Laterality Date   • APPENDECTOMY     • CARDIAC CATHETERIZATION N/A 2019    Procedure: Left Heart Cath;  Surgeon: Rod Gomes MD;  Location: [facility] INVASIVE LOCATION;  Service: Cardiology   • CARDIAC CATHETERIZATION N/A 2019    Procedure: Stent ROBY coronary;  Surgeon: Rod Gomes MD;  Location: [facility] CATH INVASIVE LOCATION;  Service: Cardiology   • CARDIAC CATHETERIZATION N/A 2019    Procedure: Coronary angiography;  Surgeon: Rod Gomes MD;  Location: [facility] CATH INVASIVE LOCATION;  Service: Cardiology   • CARDIAC CATHETERIZATION N/A 2019    Procedure: Left ventriculography;  Surgeon: Rod Gomes MD;  Location: [facility] CATH INVASIVE LOCATION;  Service: Cardiology   • FEMUR FRACTURE SURGERY Right    • HAND  SURGERY Right    • SPLENECTOMY         Social History     Socioeconomic History   • Marital status:      Spouse name: Not on file   • Number of children: Not on file   • Years of education: Not on file   • Highest education level: Not on file   Tobacco Use   • Smoking status: Former Smoker     Types: Cigarettes     Last attempt to quit: 1999     Years since quittin.4   • Smokeless tobacco: Never Used   • Tobacco comment: CAFFEINE USE: 1-16OZ COKE DAILY   Substance and Sexual Activity   • Alcohol use: Yes     Alcohol/week: 3.0 standard drinks     Types: 3 Cans of beer per week     Comment: OCCASSIONALLY   • Drug use: Defer   • Sexual activity: Defer       Family History   Problem Relation Age of Onset   • Heart failure Mother        Review of Systems   Constitution: Negative for decreased appetite, fever, malaise/fatigue and weight loss.   HENT: Negative for nosebleeds.    Eyes: Negative for double vision.   Cardiovascular: Negative for chest pain, claudication, cyanosis, dyspnea on exertion, irregular heartbeat, leg swelling, near-syncope, orthopnea, palpitations, paroxysmal nocturnal dyspnea and syncope.   Respiratory: Negative for cough, hemoptysis and shortness of breath.    Hematologic/Lymphatic: Negative for bleeding problem.   Skin: Negative for rash.   Musculoskeletal: Negative for falls and myalgias.   Gastrointestinal: Negative for hematochezia, jaundice, melena, nausea and vomiting.   Genitourinary: Negative for hematuria.   Neurological: Negative for dizziness and seizures.   Psychiatric/Behavioral: Negative for altered mental status and memory loss.       Allergies   Allergen Reactions   • Topiramate Other (See Comments)     irritability         Current Outpatient Medications:   •  atorvastatin (LIPITOR) 40 MG tablet, Take 1 tablet by mouth Every Night., Disp: 90 tablet, Rfl: 1  •  baclofen (LIORESAL) 10 MG tablet, Take 10 mg by mouth 3 (Three) Times a Day., Disp: , Rfl:   •   "clopidogrel (PLAVIX) 75 MG tablet, Take 1 tablet by mouth Daily., Disp: 90 tablet, Rfl: 1  •  fluticasone (FLONASE) 50 MCG/ACT nasal spray, 2 sprays into the nostril(s) as directed by provider Daily As Needed for Rhinitis., Disp: , Rfl:   •  furosemide (LASIX) 20 MG tablet, Take 1 tablet by mouth Daily., Disp: 30 tablet, Rfl: 6  •  HYDROcodone-acetaminophen (NORCO)  MG per tablet, Take 1 tablet by mouth Every 6 (Six) Hours As Needed for Moderate Pain ., Disp: , Rfl:   •  lisinopril (PRINIVIL,ZESTRIL) 5 MG tablet, Take 5 mg by mouth Daily., Disp: , Rfl:   •  metoprolol succinate XL (TOPROL-XL) 25 MG 24 hr tablet, Take 1.5 tablets by mouth Daily., Disp: 135 tablet, Rfl: 1  •  pantoprazole (PROTONIX) 40 MG EC tablet, Take 40 mg by mouth Daily., Disp: , Rfl:   •  potassium chloride (K-DUR) 10 MEQ CR tablet, Take 2 tablets by mouth Daily., Disp: 60 tablet, Rfl: 11  •  tamsulosin (FLOMAX) 0.4 MG capsule 24 hr capsule, Take 1 capsule by mouth Every Night., Disp: , Rfl:       Objective:     Vitals:    01/07/20 1420   BP: 148/96   Pulse: 82   Weight: 104 kg (230 lb)   Height: 177.8 cm (70\")     Body mass index is 33 kg/m².    Physical Exam   Constitutional: He is oriented to person, place, and time. He appears well-developed and well-nourished.   HENT:   Head: Normocephalic.   Eyes: No scleral icterus.   Neck: No JVD present. No thyromegaly present.   Cardiovascular: Normal rate and normal heart sounds. An irregularly irregular rhythm present. Exam reveals no gallop and no friction rub.   No murmur heard.  Pulmonary/Chest: Effort normal and breath sounds normal. He has no wheezes. He has no rales.   Abdominal: Soft. There is no hepatosplenomegaly. There is no tenderness.   Musculoskeletal: Normal range of motion. He exhibits no edema.   Lymphadenopathy:     He has no cervical adenopathy.   Neurological: He is alert and oriented to person, place, and time.   Skin: Skin is warm and dry. No rash noted.   Psychiatric: He " has a normal mood and affect.         ECG 12 Lead  Date/Time: 1/7/2020 3:10 PM  Performed by: Rod Gomes MD  Authorized by: Rod Gomes MD   Comparison: compared with previous ECG   Rhythm: atrial fibrillation  Other findings: non-specific ST-T wave changes    Clinical impression: abnormal EKG             Assessment:       Diagnosis Plan   1. Ventricular fibrillation (CMS/HCC)     2. Acute MI, inferoposterior wall (CMS/HCC)            Plan:       He had an inferior STEMI he has had persistent A. fib he had not been anticoagulated because of severe hemorrhoidal bleeding when he has been anticoagulated before his blood pressures up I think we definitely want to be aggressive with her treatment I would increase his lisinopril to 20 mg a day and I will have him come see Justine in 6 weeks if his blood pressure can tolerate it I put him on Aldactone of course will want to check a BMP in about 2 weeks after that    As always, it has been a pleasure to participate in your patient's care.      Sincerely,       Rod Gomes MD

## 2020-02-03 RX ORDER — ATORVASTATIN CALCIUM 40 MG/1
TABLET, FILM COATED ORAL
Qty: 90 TABLET | Refills: 4 | Status: SHIPPED | OUTPATIENT
Start: 2020-02-03 | End: 2021-04-28

## 2020-02-03 RX ORDER — CLOPIDOGREL BISULFATE 75 MG/1
TABLET ORAL
Qty: 90 TABLET | Refills: 4 | Status: SHIPPED | OUTPATIENT
Start: 2020-02-03 | End: 2021-04-28

## 2020-02-17 NOTE — PROGRESS NOTES
Date of Office Visit: 2020  Encounter Provider: BEATRICE Truong  Place of Service: [facility] CARDIOLOGY  Patient Name: Frank Patel  :1955    Chief Complaint   Patient presents with   • Atrial Fibrillation     6  WK FOLLOW UP   • Coronary Artery Disease   :     HPI: Frank Patel is a 64 y.o. male who presents today for follow-up.  Old records have been obtained and reviewed by me.  He is a patient of Dr. Gomes's who is known to me.  He has a past cardiac history significant for atrial fibrillation and coronary artery disease.  In 2019, he presented with an acute inferolateral and anterior STEMI.  He had some ventricular fibrillation and did receive 1 shock.  Cardiac catheterization revealed 10% luminal irregularities in the left main, 50% proximal LAD, 20 to 30% first diagonal, normal distal and mid LAD, 30% proximal RCA, and a 100% occlusion proximally above the RV branch.  He underwent drug-eluting stent placement to the proximal RCA.  This was complicated by distal no reflow.  Echocardiogram the following day revealed moderately decreased LV function with an EF of 36 to 40% and no significant valvular abnormalities.  He developed acute renal insufficiency and the losartan had to be discontinued.  His renal function improved to baseline.  He was started on dual antiplatelet therapy, atorvastatin, and Lasix.  He has never been anticoagulated for his atrial fibrillation because of severe hemorrhoidal bleeding.  His last echocardiogram was in 2019 which revealed normal LV function with an EF of 52%.   He was last seen in the office by Dr. Gomes on 2020 at which time he was overall doing well.  He did have some shortness of breath occasionally and noticed his blood pressures have been high since the holiday.  His lisinopril was increased to 20 mg daily.  He was instructed to follow-up with me in 6 weeks.     Over the past 6 weeks, he has  been doing well from a cardiac standpoint.  He denies any chest pain, shortness of breath, palpitations, edema, dizziness, or syncope.  He denies any bleeding difficulties or melena.  Evidently he never started taking the new dose of lisinopril because he never received a prescription.  For a few weeks, he was taking 5 mg in the morning and 5 mg in the evening.  However, for the last couple of weeks, he has resumed taking the 5 mg daily.  He reports his blood pressures at home have been consistently in the 110s to 120s systolic and 70s to 80s diastolic.  He also reports that he saw his nephrologist recently, Dr. Good, who started him on hydrochlorothiazide.  He exercises at gym at work 4 days a week without any difficulty.  He does follow a low-sodium diet.    Past Medical History:   Diagnosis Date   • Acute MI, inferoposterior wall (CMS/HCC)    • Atrial fibrillation (CMS/HCC)    • Elevated cholesterol    • GERD (gastroesophageal reflux disease)    • Head trauma 2007   • HTN (hypertension)    • Kidney stone    • Muscle spasm    • Ventricular fibrillation (CMS/HCC)        Past Surgical History:   Procedure Laterality Date   • APPENDECTOMY     • CARDIAC CATHETERIZATION N/A 8/18/2019    Procedure: Left Heart Cath;  Surgeon: Rod Gomes MD;  Location: [facility] CATH INVASIVE LOCATION;  Service: Cardiology   • CARDIAC CATHETERIZATION N/A 8/18/2019    Procedure: Stent ROBY coronary;  Surgeon: Rod Gomes MD;  Location: [facility] CATH INVASIVE LOCATION;  Service: Cardiology   • CARDIAC CATHETERIZATION N/A 8/18/2019    Procedure: Coronary angiography;  Surgeon: Rod Gomes MD;  Location: [facility] CATH INVASIVE LOCATION;  Service: Cardiology   • CARDIAC CATHETERIZATION N/A 8/18/2019    Procedure: Left ventriculography;  Surgeon: Rod Gomes MD;  Location: [facility] CATH INVASIVE LOCATION;  Service: Cardiology   • FEMUR FRACTURE SURGERY Right 1990   • HAND SURGERY Right    • SPLENECTOMY  2007       Social History      Socioeconomic History   • Marital status:      Spouse name: Not on file   • Number of children: Not on file   • Years of education: Not on file   • Highest education level: Not on file   Tobacco Use   • Smoking status: Former Smoker     Types: Cigarettes     Last attempt to quit: 1999     Years since quittin.5   • Smokeless tobacco: Never Used   • Tobacco comment: CAFFEINE USE: 1-2 16OZ COKES DAILY   Substance and Sexual Activity   • Alcohol use: Yes     Alcohol/week: 3.0 standard drinks     Types: 3 Cans of beer per week     Comment: OCCASSIONALLY   • Drug use: Defer   • Sexual activity: Defer       Family History   Problem Relation Age of Onset   • Heart failure Mother        Review of Systems   Constitution: Negative for chills, fever and malaise/fatigue.   Cardiovascular: Negative for chest pain, dyspnea on exertion, leg swelling, near-syncope, orthopnea, palpitations, paroxysmal nocturnal dyspnea and syncope.   Respiratory: Negative for cough and shortness of breath.    Musculoskeletal: Negative for joint pain, joint swelling and myalgias.   Gastrointestinal: Negative for abdominal pain, diarrhea, melena, nausea and vomiting.   Genitourinary: Negative for frequency and hematuria.   Neurological: Negative for light-headedness, numbness, paresthesias and seizures.   Allergic/Immunologic: Negative.    All other systems reviewed and are negative.      Allergies   Allergen Reactions   • Topiramate Other (See Comments)     irritability         Current Outpatient Medications:   •  atorvastatin (LIPITOR) 40 MG tablet, TAKE 1 TABLET EVERY NIGHT, Disp: 90 tablet, Rfl: 4  •  baclofen (LIORESAL) 10 MG tablet, Take 10 mg by mouth 3 (Three) Times a Day., Disp: , Rfl:   •  clopidogrel (PLAVIX) 75 MG tablet, TAKE 1 TABLET DAILY, Disp: 90 tablet, Rfl: 4  •  fluticasone (FLONASE) 50 MCG/ACT nasal spray, 2 sprays into the nostril(s) as directed by provider Daily As Needed for Rhinitis., Disp: , Rfl:   •   "hydroCHLOROthiazide (HYDRODIURIL) 25 MG tablet, Take 25 mg by mouth Daily., Disp: , Rfl:   •  HYDROcodone-acetaminophen (NORCO)  MG per tablet, Take 1 tablet by mouth Every 6 (Six) Hours As Needed for Moderate Pain ., Disp: , Rfl:   •  lisinopril (PRINIVIL,ZESTRIL) 5 MG tablet, Take 5 mg by mouth Daily., Disp: , Rfl:   •  metoprolol succinate XL (TOPROL-XL) 25 MG 24 hr tablet, Take 1.5 tablets by mouth Daily., Disp: 135 tablet, Rfl: 1  •  pantoprazole (PROTONIX) 40 MG EC tablet, Take 40 mg by mouth Daily., Disp: , Rfl:   •  tamsulosin (FLOMAX) 0.4 MG capsule 24 hr capsule, Take 1 capsule by mouth Every Night., Disp: , Rfl:       Objective:     Vitals:    02/18/20 1320 02/18/20 1329   BP: 142/84 138/80   BP Location: Right arm Left arm   Patient Position: Sitting Sitting   Pulse: 55    Resp: 18    SpO2: 99%    Weight: 104 kg (229 lb 6.4 oz)    Height: 177.8 cm (70\")      Body mass index is 32.92 kg/m².    PHYSICAL EXAM:    Physical Exam   Constitutional: He is oriented to person, place, and time. He appears well-developed and well-nourished. No distress.   HENT:   Head: Normocephalic and atraumatic.   Eyes: Pupils are equal, round, and reactive to light.   Neck: No JVD present. No thyromegaly present.   Cardiovascular: Normal rate, normal heart sounds and intact distal pulses. An irregularly irregular rhythm present.   No murmur heard.  Pulmonary/Chest: Effort normal and breath sounds normal. No respiratory distress.   Abdominal: Soft. Bowel sounds are normal. He exhibits no distension. There is no splenomegaly or hepatomegaly. There is no tenderness.   Musculoskeletal: Normal range of motion. He exhibits no edema.   Neurological: He is alert and oriented to person, place, and time.   Skin: Skin is warm and dry. He is not diaphoretic. No erythema.   Psychiatric: He has a normal mood and affect. His behavior is normal. Judgment normal.         ECG 12 Lead  Date/Time: 2/18/2020 1:40 PM  Performed by: Rodrigo" BEATRICE Hoskins  Authorized by: Justine Wallace APRN   Comparison: compared with previous ECG from 1/7/2020  Similar to previous ECG  Rhythm: atrial fibrillation  Rate: normal  BPM: 72  Q waves: V1    T inversion: III  Other findings: non-specific ST-T wave changes    Clinical impression: abnormal EKG  Comments: Indication: CAD              Assessment:       Diagnosis Plan   1. Coronary artery disease involving native coronary artery of native heart without angina pectoris  ECG 12 Lead   2. Chronic a-fib     3. Ischemic cardiomyopathy     4. Essential hypertension       Orders Placed This Encounter   Procedures   • ECG 12 Lead     This order was created via procedure documentation          Plan:       1.  Coronary artery disease.  He is status post drug-eluting stent placement in August 2019.  He remains on Plavix and atorvastatin.      2.  Chronic atrial fibrillation.  He is in chronic atrial fibrillation with a controlled rate.  He is not anticoagulated due to a history of severe hemorrhoidal bleeding.        3.  Ischemic cardiomyopathy.  His most recent echocardiogram from November 2019 revealed normalization of his EF.  At his last visit, Dr. Gomes intended to increase his lisinopril.  However, the patient has still only been taking 5 mg daily because a new prescription was never called in. The Lasix and KCL were discontinued by his nephrologist and he is instead taking HCTZ.  He denies any symptoms of heart failure and appears euvolemic.      4.  Hypertension.  He reports that his blood pressure at home has been well controlled in the 110s to 120s systolic in the 70s to 80s and diastolic.  He is following a low-sodium diet.  He is exercising consistently.  I suspect his blood pressure is better controlled because of the addition of hydrochlorothiazide.  Continue current regimen.      Overall I think he is doing well from a cardiac standpoint.  He denies any symptoms of angina or heart failure.  I am not  going to make any changes to his medical regimen, and he will follow-up with Dr. Gomes in 6 months or sooner if needed.      As always, it has been a pleasure to participate in your patient's care.      Sincerely,         BEATRICE Jaffe

## 2020-02-18 ENCOUNTER — OFFICE VISIT (OUTPATIENT)
Dept: CARDIOLOGY | Facility: CLINIC | Age: 65
End: 2020-02-18

## 2020-02-18 VITALS
HEIGHT: 70 IN | OXYGEN SATURATION: 99 % | RESPIRATION RATE: 18 BRPM | HEART RATE: 55 BPM | WEIGHT: 229.4 LBS | DIASTOLIC BLOOD PRESSURE: 80 MMHG | SYSTOLIC BLOOD PRESSURE: 138 MMHG | BODY MASS INDEX: 32.84 KG/M2

## 2020-02-18 DIAGNOSIS — I10 ESSENTIAL HYPERTENSION: ICD-10-CM

## 2020-02-18 DIAGNOSIS — I48.20 CHRONIC A-FIB (HCC): ICD-10-CM

## 2020-02-18 DIAGNOSIS — I25.5 ISCHEMIC CARDIOMYOPATHY: ICD-10-CM

## 2020-02-18 DIAGNOSIS — I25.10 CORONARY ARTERY DISEASE INVOLVING NATIVE CORONARY ARTERY OF NATIVE HEART WITHOUT ANGINA PECTORIS: Primary | ICD-10-CM

## 2020-02-18 PROCEDURE — 93000 ELECTROCARDIOGRAM COMPLETE: CPT | Performed by: NURSE PRACTITIONER

## 2020-02-18 PROCEDURE — 99214 OFFICE O/P EST MOD 30 MIN: CPT | Performed by: NURSE PRACTITIONER

## 2020-02-18 RX ORDER — LISINOPRIL 5 MG/1
5 TABLET ORAL DAILY
COMMUNITY

## 2020-02-18 RX ORDER — HYDROCHLOROTHIAZIDE 25 MG/1
25 TABLET ORAL DAILY
COMMUNITY
Start: 2020-01-13

## 2020-03-23 RX ORDER — METOPROLOL SUCCINATE 25 MG/1
TABLET, EXTENDED RELEASE ORAL
Qty: 135 TABLET | Refills: 1 | Status: SHIPPED | OUTPATIENT
Start: 2020-03-23 | End: 2020-09-21

## 2020-08-25 ENCOUNTER — OFFICE VISIT (OUTPATIENT)
Dept: CARDIOLOGY | Facility: CLINIC | Age: 65
End: 2020-08-25

## 2020-08-25 VITALS
BODY MASS INDEX: 33.64 KG/M2 | WEIGHT: 235 LBS | OXYGEN SATURATION: 98 % | HEART RATE: 78 BPM | RESPIRATION RATE: 20 BRPM | HEIGHT: 70 IN

## 2020-08-25 DIAGNOSIS — I10 ESSENTIAL HYPERTENSION: ICD-10-CM

## 2020-08-25 DIAGNOSIS — I49.01 VENTRICULAR FIBRILLATION (HCC): ICD-10-CM

## 2020-08-25 DIAGNOSIS — I48.20 CHRONIC A-FIB (HCC): ICD-10-CM

## 2020-08-25 DIAGNOSIS — I21.19 ACUTE MI, INFEROPOSTERIOR WALL (HCC): Primary | ICD-10-CM

## 2020-08-25 DIAGNOSIS — Z95.5 S/P DRUG ELUTING CORONARY STENT PLACEMENT: ICD-10-CM

## 2020-08-25 PROBLEM — I25.5 ISCHEMIC CARDIOMYOPATHY: Status: RESOLVED | Noted: 2019-10-01 | Resolved: 2020-08-25

## 2020-08-25 PROCEDURE — 99214 OFFICE O/P EST MOD 30 MIN: CPT | Performed by: INTERNAL MEDICINE

## 2020-08-25 NOTE — PROGRESS NOTES
Date of Office Visit: 20  Encounter Provider: Rod Gomes MD  Place of Service: [facility] CARDIOLOGY  Patient Name: Frank Patel  :1955  [id number]    Chief Complaint   Patient presents with   • Atrial Fibrillation     6 month follow up   • Coronary Artery Disease   :     HPI: Frank Patel is a 65 y.o. male gentleman that had an inferior MI with VF arrest and complicated by severe no reflow very large RCA stented did not have a lot of other disease in his other coronaries LV function at the time of the MI was about 35%.  Follow-up echo showed LV function had normalized.  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    Past Medical History:   Diagnosis Date   • Acute MI, inferoposterior wall (CMS/HCC)    • Atrial fibrillation (CMS/HCC)    • Elevated cholesterol    • GERD (gastroesophageal reflux disease)    • Head trauma    • HTN (hypertension)    • Kidney stone    • Muscle spasm    • Ventricular fibrillation (CMS/HCC)        Past Surgical History:   Procedure Laterality Date   • APPENDECTOMY     • CARDIAC CATHETERIZATION N/A 2019    Procedure: Left Heart Cath;  Surgeon: Rod Gomes MD;  Location: [facility] CATH INVASIVE LOCATION;  Service: Cardiology   • CARDIAC CATHETERIZATION N/A 2019    Procedure: Stent ROBY coronary;  Surgeon: Rod Gomes MD;  Location: [facility] CATH INVASIVE LOCATION;  Service: Cardiology   • CARDIAC CATHETERIZATION N/A 2019    Procedure: Coronary angiography;  Surgeon: Rod Gomes MD;  Location: [facility] CATH INVASIVE LOCATION;  Service: Cardiology   • CARDIAC CATHETERIZATION N/A 2019    Procedure: Left ventriculography;  Surgeon: Rod Gomes MD;  Location: [facility] CATH INVASIVE LOCATION;  Service: Cardiology   • FEMUR FRACTURE SURGERY Right    • HAND SURGERY Right    • SPLENECTOMY          Social History     Socioeconomic History   • Marital status:      Spouse name: Not on file   • Number of children: Not on file   • Years of education: Not on file   • Highest education level: Not on file   Tobacco Use   • Smoking status: Former Smoker     Types: Cigarettes     Last attempt to quit: 1999     Years since quittin.0   • Smokeless tobacco: Never Used   • Tobacco comment: CAFFEINE USE: 1-2 16OZ COKES DAILY   Substance and Sexual Activity   • Alcohol use: Yes     Alcohol/week: 3.0 standard drinks     Types: 3 Cans of beer per week     Comment: OCCASSIONALLY   • Drug use: Defer   • Sexual activity: Defer       Family History   Problem Relation Age of Onset   • Heart failure Mother        Review of Systems   Constitution: Negative for decreased appetite, fever, malaise/fatigue and weight loss.   HENT: Negative for nosebleeds.    Eyes: Negative for double vision.   Cardiovascular: Negative for chest pain, claudication, cyanosis, dyspnea on exertion, irregular heartbeat, leg swelling, near-syncope, orthopnea, palpitations, paroxysmal nocturnal dyspnea and syncope.   Respiratory: Negative for cough, hemoptysis and shortness of breath.    Hematologic/Lymphatic: Negative for bleeding problem.   Skin: Negative for rash.   Musculoskeletal: Negative for falls and myalgias.   Gastrointestinal: Negative for hematochezia, jaundice, melena, nausea and vomiting.   Genitourinary: Negative for hematuria.   Neurological: Negative for dizziness and seizures.   Psychiatric/Behavioral: Negative for altered mental status and memory loss.       Allergies   Allergen Reactions   • Topiramate Other (See Comments)     irritability         Current Outpatient Medications:   •  atorvastatin (LIPITOR) 40 MG tablet, TAKE 1 TABLET EVERY NIGHT, Disp: 90 tablet, Rfl: 4  •  baclofen (LIORESAL) 10 MG tablet, Take 10 mg by mouth 3 (Three) Times a Day., Disp: , Rfl:   •  clopidogrel (PLAVIX) 75 MG tablet, TAKE 1 TABLET  "DAILY, Disp: 90 tablet, Rfl: 4  •  fluticasone (FLONASE) 50 MCG/ACT nasal spray, 2 sprays into the nostril(s) as directed by provider Daily As Needed for Rhinitis., Disp: , Rfl:   •  hydroCHLOROthiazide (HYDRODIURIL) 25 MG tablet, Take 25 mg by mouth Daily., Disp: , Rfl:   •  HYDROcodone-acetaminophen (NORCO)  MG per tablet, Take 1 tablet by mouth Every 6 (Six) Hours As Needed for Moderate Pain ., Disp: , Rfl:   •  lisinopril (PRINIVIL,ZESTRIL) 5 MG tablet, Take 5 mg by mouth Daily., Disp: , Rfl:   •  metoprolol succinate XL (TOPROL-XL) 25 MG 24 hr tablet, TAKE ONE AND ONE-HALF TABLETS DAILY, Disp: 135 tablet, Rfl: 1  •  pantoprazole (PROTONIX) 40 MG EC tablet, Take 40 mg by mouth Daily., Disp: , Rfl:   •  tamsulosin (FLOMAX) 0.4 MG capsule 24 hr capsule, Take 1 capsule by mouth Every Night., Disp: , Rfl:       Objective:     Vitals:    08/25/20 1404   Pulse: 78   Resp: 20   SpO2: 98%   Weight: 107 kg (235 lb)   Height: 177.8 cm (70\")     Body mass index is 33.72 kg/m².    Physical Exam   Constitutional: He is oriented to person, place, and time. He appears well-developed and well-nourished.   HENT:   Head: Normocephalic.   Eyes: No scleral icterus.   Neck: No JVD present. No thyromegaly present.   Cardiovascular: Normal rate and normal heart sounds. An irregularly irregular rhythm present. Exam reveals no gallop and no friction rub.   No murmur heard.  Pulmonary/Chest: Effort normal and breath sounds normal. He has no wheezes. He has no rales.   Abdominal: Soft. There is no hepatosplenomegaly. There is no tenderness.   Musculoskeletal: Normal range of motion. He exhibits no edema.   Lymphadenopathy:     He has no cervical adenopathy.   Neurological: He is alert and oriented to person, place, and time.   Skin: Skin is warm and dry. No rash noted.   Psychiatric: He has a normal mood and affect.       Procedures     Assessment:       Diagnosis Plan   1. Acute MI, inferoposterior wall (CMS/HCC)     2. Chronic " a-fib (CMS/[facility])     3. Essential hypertension     4. S/P drug eluting coronary stent placement     5. Ventricular fibrillation (CMS/[facility])            Plan:       He is doing very well we have 2 cardiac issues with him 1 he had an inferior infarct within cardiac arrest I think he is asymptomatic from that his risk modification is good he is on good medical therapy.  I do not think I change anything from that standpoint second is he is A. fib is chronic he has a chads 2 Vascor of 1 that I would normally think about anticoagulating him but he has had a lot of rectal bleeding from hemorrhoids he is not really interested in having those operated on right now so we are going to just stay on Plavix it may help a little bit lower his risk of stroke not like NOAC but better than aspirin and better than nothing.  I would come back and see Justine or Jessie in a year and then see me in 2 years    As always, it has been a pleasure to participate in your patient's care.      Sincerely,       Rod Gomes MD

## 2020-09-21 RX ORDER — METOPROLOL SUCCINATE 25 MG/1
TABLET, EXTENDED RELEASE ORAL
Qty: 135 TABLET | Refills: 3 | Status: SHIPPED | OUTPATIENT
Start: 2020-09-21 | End: 2021-09-13

## 2021-04-28 RX ORDER — CLOPIDOGREL BISULFATE 75 MG/1
TABLET ORAL
Qty: 90 TABLET | Refills: 3 | Status: SHIPPED | OUTPATIENT
Start: 2021-04-28 | End: 2022-03-31 | Stop reason: SDUPTHER

## 2021-04-28 RX ORDER — ATORVASTATIN CALCIUM 40 MG/1
TABLET, FILM COATED ORAL
Qty: 90 TABLET | Refills: 3 | Status: SHIPPED | OUTPATIENT
Start: 2021-04-28 | End: 2022-03-29 | Stop reason: SDUPTHER

## 2021-09-02 ENCOUNTER — OFFICE VISIT (OUTPATIENT)
Dept: CARDIOLOGY | Facility: CLINIC | Age: 66
End: 2021-09-02

## 2021-09-02 ENCOUNTER — TELEPHONE (OUTPATIENT)
Dept: CARDIOLOGY | Facility: CLINIC | Age: 66
End: 2021-09-02

## 2021-09-02 VITALS
HEIGHT: 70 IN | WEIGHT: 221 LBS | SYSTOLIC BLOOD PRESSURE: 116 MMHG | BODY MASS INDEX: 31.64 KG/M2 | HEART RATE: 76 BPM | DIASTOLIC BLOOD PRESSURE: 78 MMHG

## 2021-09-02 DIAGNOSIS — I48.20 CHRONIC A-FIB (HCC): ICD-10-CM

## 2021-09-02 DIAGNOSIS — I10 ESSENTIAL HYPERTENSION: ICD-10-CM

## 2021-09-02 DIAGNOSIS — E78.5 HYPERLIPIDEMIA, UNSPECIFIED HYPERLIPIDEMIA TYPE: ICD-10-CM

## 2021-09-02 DIAGNOSIS — I25.10 CORONARY ARTERY DISEASE INVOLVING NATIVE CORONARY ARTERY OF NATIVE HEART WITHOUT ANGINA PECTORIS: Primary | ICD-10-CM

## 2021-09-02 PROCEDURE — 93000 ELECTROCARDIOGRAM COMPLETE: CPT | Performed by: NURSE PRACTITIONER

## 2021-09-02 PROCEDURE — 99214 OFFICE O/P EST MOD 30 MIN: CPT | Performed by: NURSE PRACTITIONER

## 2021-09-02 RX ORDER — ASPIRIN 81 MG/1
81 TABLET, CHEWABLE ORAL DAILY
COMMUNITY
End: 2021-09-02

## 2021-09-02 RX ORDER — TESTOSTERONE 20.25 MG/1.25G
20.25 GEL TOPICAL
COMMUNITY

## 2021-09-02 RX ORDER — GABAPENTIN 300 MG/1
CAPSULE ORAL
COMMUNITY
Start: 2021-08-26

## 2021-09-02 RX ORDER — ACETAMINOPHEN 500 MG
1000 TABLET ORAL
COMMUNITY
Start: 2021-08-26

## 2021-09-02 RX ORDER — METHOCARBAMOL 500 MG/1
TABLET, FILM COATED ORAL
COMMUNITY
Start: 2021-08-26

## 2021-09-02 RX ORDER — OXYCODONE HYDROCHLORIDE 10 MG/1
5 TABLET ORAL
COMMUNITY
Start: 2021-08-26 | End: 2022-08-01
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
## 2021-09-02 NOTE — TELEPHONE ENCOUNTER
Called patient and told him if no anesthesia is required he can hold the plavix, patient stated he wasn't being put under

## 2021-09-02 NOTE — TELEPHONE ENCOUNTER
Patient needing surgery clearance for wrist surgery.  They would like for him to be off his Plavix    [phone]

## 2021-09-13 RX ORDER — METOPROLOL SUCCINATE 25 MG/1
TABLET, EXTENDED RELEASE ORAL
Qty: 135 TABLET | Refills: 2 | Status: SHIPPED | OUTPATIENT
Start: 2021-09-13

## 2022-03-29 RX ORDER — ATORVASTATIN CALCIUM 40 MG/1
40 TABLET, FILM COATED ORAL NIGHTLY
Qty: 90 TABLET | Refills: 3 | Status: SHIPPED | OUTPATIENT
Start: 2022-03-29 | End: 2022-04-05 | Stop reason: SDUPTHER

## 2022-03-31 RX ORDER — CLOPIDOGREL BISULFATE 75 MG/1
75 TABLET ORAL DAILY
Qty: 90 TABLET | Refills: 3 | Status: SHIPPED | OUTPATIENT
Start: 2022-03-31 | End: 2022-04-05 | Stop reason: SDUPTHER

## 2022-04-05 RX ORDER — ATORVASTATIN CALCIUM 40 MG/1
40 TABLET, FILM COATED ORAL NIGHTLY
Qty: 90 TABLET | Refills: 3 | Status: SHIPPED | OUTPATIENT
Start: 2022-04-05 | End: 2023-03-23

## 2022-04-05 RX ORDER — CLOPIDOGREL BISULFATE 75 MG/1
75 TABLET ORAL DAILY
Qty: 90 TABLET | Refills: 3 | Status: SHIPPED | OUTPATIENT
Start: 2022-04-05 | End: 2023-03-17

## 2022-06-30 ENCOUNTER — TELEPHONE (OUTPATIENT)
Dept: ONCOLOGY | Facility: CLINIC | Age: 67
End: 2022-06-30

## 2022-06-30 NOTE — TELEPHONE ENCOUNTER
Caller: Frank Patel    Relationship: Self    Best call back number: [phone]    OR MOBILE [phone]    What is the best time to reach you: 11:30AM OR AFTER     CAN LEAVE VOICEMAIL IF UNABLE REACH     Who are you requesting to speak with (clinical staff, provider,  specific staff member): SCHEDULING       What was the call regarding:   NEED TO R/S NEW PT APPT DR JERONIMO ORIGINAL 07/07    IS OFF WEEK OF 07/07 CAN DO ANYTIME 11:30AM OR AFTER FOR APPT TIME  THAT WEEK     I WAS NOT ABLE TO R/S WITHIN TIME FRAME SENDING TO SCHEDULING    IF CANNOT DO WEEK OF 07/07, THE BEST DAYS ARE MONDAYS IS OFF THOSE DAYS AND PREFER 11:30AM OR AFTER APPT TIME     AND IF NOT ON MondayS NEED TO BE 11:30AM AND LATEST BEFORE 2:30PM TO BE ABLE TO GET WORK . FOR APPT TIME         Do you require a callback: YES     ADDITIONAL NOTE: PATIENT HAS RECEIVED NP PK IN MAIL INSTRUCTED TO COMPLETE AND BRING TO APPT, COMP PRE-REG/SSN, COMP WELL MEI,

## 2022-07-07 ENCOUNTER — APPOINTMENT (OUTPATIENT)
Dept: LAB | Facility: HOSPITAL | Age: 67
End: 2022-07-07

## 2022-07-25 DIAGNOSIS — D72.829 LEUKOCYTOSIS, UNSPECIFIED TYPE: Primary | ICD-10-CM

## 2022-08-01 ENCOUNTER — CONSULT (OUTPATIENT)
Dept: ONCOLOGY | Facility: CLINIC | Age: 67
End: 2022-08-01

## 2022-08-01 ENCOUNTER — TELEPHONE (OUTPATIENT)
Dept: ONCOLOGY | Facility: CLINIC | Age: 67
End: 2022-08-01

## 2022-08-01 ENCOUNTER — LAB (OUTPATIENT)
Dept: LAB | Facility: HOSPITAL | Age: 67
End: 2022-08-01

## 2022-08-01 VITALS
OXYGEN SATURATION: 95 % | DIASTOLIC BLOOD PRESSURE: 75 MMHG | TEMPERATURE: 97.1 F | BODY MASS INDEX: 33.07 KG/M2 | SYSTOLIC BLOOD PRESSURE: 136 MMHG | HEIGHT: 70 IN | WEIGHT: 231 LBS | HEART RATE: 73 BPM

## 2022-08-01 DIAGNOSIS — D50.9 IRON DEFICIENCY ANEMIA, UNSPECIFIED IRON DEFICIENCY ANEMIA TYPE: ICD-10-CM

## 2022-08-01 DIAGNOSIS — E83.42 HYPOMAGNESEMIA: ICD-10-CM

## 2022-08-01 DIAGNOSIS — D72.829 LEUKOCYTOSIS, UNSPECIFIED TYPE: ICD-10-CM

## 2022-08-01 DIAGNOSIS — D72.10 EOSINOPHILIA, UNSPECIFIED TYPE: ICD-10-CM

## 2022-08-01 DIAGNOSIS — D64.9 ANEMIA, UNSPECIFIED TYPE: ICD-10-CM

## 2022-08-01 DIAGNOSIS — D64.9 ANEMIA, UNSPECIFIED TYPE: Primary | ICD-10-CM

## 2022-08-01 LAB
ALBUMIN SERPL-MCNC: 4.6 G/DL (ref 3.5–5.2)
ALBUMIN/GLOB SERPL: 1.5 G/DL (ref 1.1–2.4)
ALP SERPL-CCNC: 83 U/L (ref 38–116)
ALT SERPL W P-5'-P-CCNC: 15 U/L (ref 0–41)
ANION GAP SERPL CALCULATED.3IONS-SCNC: 10.9 MMOL/L (ref 5–15)
AST SERPL-CCNC: 17 U/L (ref 0–40)
BASOPHILS # BLD AUTO: 0.24 10*3/MM3 (ref 0–0.2)
BASOPHILS NFR BLD AUTO: 1.7 % (ref 0–1.5)
BILIRUB SERPL-MCNC: 0.3 MG/DL (ref 0.2–1.2)
BUN SERPL-MCNC: 18 MG/DL (ref 6–20)
BUN/CREAT SERPL: 12.9 (ref 7.3–30)
CALCIUM SPEC-SCNC: 9.2 MG/DL (ref 8.5–10.2)
CHLORIDE SERPL-SCNC: 108 MMOL/L (ref 98–107)
CO2 SERPL-SCNC: 25.1 MMOL/L (ref 22–29)
CREAT SERPL-MCNC: 1.39 MG/DL (ref 0.7–1.3)
DEPRECATED RDW RBC AUTO: 49.2 FL (ref 37–54)
EGFRCR SERPLBLD CKD-EPI 2021: 56.6 ML/MIN/1.73
EOSINOPHIL # BLD AUTO: 1.91 10*3/MM3 (ref 0–0.4)
EOSINOPHIL NFR BLD AUTO: 13.9 % (ref 0.3–6.2)
ERYTHROCYTE [DISTWIDTH] IN BLOOD BY AUTOMATED COUNT: 14.5 % (ref 12.3–15.4)
FERRITIN SERPL-MCNC: 9.5 NG/ML (ref 30–400)
FOLATE SERPL-MCNC: 8.77 NG/ML (ref 4.78–24.2)
GLOBULIN UR ELPH-MCNC: 3 GM/DL (ref 1.8–3.5)
GLUCOSE SERPL-MCNC: 126 MG/DL (ref 74–124)
HCT VFR BLD AUTO: 36.4 % (ref 37.5–51)
HGB BLD-MCNC: 11.5 G/DL (ref 13–17.7)
IMM GRANULOCYTES # BLD AUTO: 0.1 10*3/MM3 (ref 0–0.05)
IMM GRANULOCYTES NFR BLD AUTO: 0.7 % (ref 0–0.5)
IRON 24H UR-MRATE: 43 MCG/DL (ref 59–158)
IRON SATN MFR SERPL: 8 % (ref 14–48)
LDH SERPL-CCNC: 187 U/L (ref 99–259)
LYMPHOCYTES # BLD AUTO: 5.32 10*3/MM3 (ref 0.7–3.1)
LYMPHOCYTES NFR BLD AUTO: 38.7 % (ref 19.6–45.3)
MAGNESIUM SERPL-MCNC: 1.6 MG/DL (ref 1.8–2.5)
MCH RBC QN AUTO: 29.4 PG (ref 26.6–33)
MCHC RBC AUTO-ENTMCNC: 31.6 G/DL (ref 31.5–35.7)
MCV RBC AUTO: 93.1 FL (ref 79–97)
MONOCYTES # BLD AUTO: 1.37 10*3/MM3 (ref 0.1–0.9)
MONOCYTES NFR BLD AUTO: 10 % (ref 5–12)
NEUTROPHILS NFR BLD AUTO: 35 % (ref 42.7–76)
NEUTROPHILS NFR BLD AUTO: 4.79 10*3/MM3 (ref 1.7–7)
NRBC BLD AUTO-RTO: 0.1 /100 WBC (ref 0–0.2)
PHOSPHATE SERPL-MCNC: 2.9 MG/DL (ref 2.5–4.5)
PLATELET # BLD AUTO: 399 10*3/MM3 (ref 140–450)
PMV BLD AUTO: 11.2 FL (ref 6–12)
POTASSIUM SERPL-SCNC: 5.3 MMOL/L (ref 3.5–4.7)
PROT SERPL-MCNC: 7.6 G/DL (ref 6.3–8)
RBC # BLD AUTO: 3.91 10*6/MM3 (ref 4.14–5.8)
SODIUM SERPL-SCNC: 144 MMOL/L (ref 134–145)
TIBC SERPL-MCNC: 521 MCG/DL (ref 249–505)
TRANSFERRIN SERPL-MCNC: 372 MG/DL (ref 200–360)
VIT B12 BLD-MCNC: 414 PG/ML (ref 211–946)
WBC NRBC COR # BLD: 13.73 10*3/MM3 (ref 3.4–10.8)

## 2022-08-01 PROCEDURE — 82607 VITAMIN B-12: CPT | Performed by: INTERNAL MEDICINE

## 2022-08-01 PROCEDURE — 99204 OFFICE O/P NEW MOD 45 MIN: CPT | Performed by: INTERNAL MEDICINE

## 2022-08-01 PROCEDURE — 83540 ASSAY OF IRON: CPT | Performed by: INTERNAL MEDICINE

## 2022-08-01 PROCEDURE — 80053 COMPREHEN METABOLIC PANEL: CPT | Performed by: INTERNAL MEDICINE

## 2022-08-01 PROCEDURE — 83735 ASSAY OF MAGNESIUM: CPT | Performed by: INTERNAL MEDICINE

## 2022-08-01 PROCEDURE — 82728 ASSAY OF FERRITIN: CPT | Performed by: INTERNAL MEDICINE

## 2022-08-01 PROCEDURE — 84100 ASSAY OF PHOSPHORUS: CPT | Performed by: INTERNAL MEDICINE

## 2022-08-01 PROCEDURE — 85025 COMPLETE CBC W/AUTO DIFF WBC: CPT

## 2022-08-01 PROCEDURE — 36415 COLL VENOUS BLD VENIPUNCTURE: CPT

## 2022-08-01 PROCEDURE — 83615 LACTATE (LD) (LDH) ENZYME: CPT | Performed by: INTERNAL MEDICINE

## 2022-08-01 PROCEDURE — 82746 ASSAY OF FOLIC ACID SERUM: CPT | Performed by: INTERNAL MEDICINE

## 2022-08-01 PROCEDURE — 84466 ASSAY OF TRANSFERRIN: CPT | Performed by: INTERNAL MEDICINE

## 2022-08-01 RX ORDER — FERROUS SULFATE 325(65) MG
325 TABLET ORAL
Qty: 90 TABLET | Refills: 2 | Status: SHIPPED | OUTPATIENT
Start: 2022-08-01 | End: 2022-10-10 | Stop reason: SDUPTHER

## 2022-08-01 RX ORDER — BIOTIN 5 MG
TABLET ORAL
COMMUNITY

## 2022-08-01 RX ORDER — MAGNESIUM OXIDE 400 MG/1
400 TABLET ORAL 2 TIMES DAILY
Qty: 60 TABLET | Refills: 2 | Status: SHIPPED | OUTPATIENT
Start: 2022-08-01 | End: 2022-10-10 | Stop reason: SDUPTHER

## 2022-08-01 NOTE — TELEPHONE ENCOUNTER
Dr Wright reviewed patient's labs and ordered:  Ferrous Sulfate 325 mg take 1 tablet 3 times day  Mag Ox 400 mg twice a day - prescription sent to [facility]  Stool Occult cards - patient instructed to come by CBC office to pickup  Patient to have a CBC, Ferritin, Iron panel and Mg+ labs drawn on 10/10/2022 and follow up with Dr Wright  Patient instructed to call back with any questions or concerns

## 2022-08-01 NOTE — PROGRESS NOTES
.     REASON FOR CONSULTATION:     Provide an opinion on any further workup or treatment of chronic leukocytosis.                             REQUESTING PHYSICIAN: Cam Duran MD    RECORDS OBTAINED:  Records of the patient's history including those obtained from the referring provider were reviewed and summarized in detail.    HISTORY OF PRESENT ILLNESS:  The patient is a 66 y.o. year old male with hypertension, hyperlipidemia, diabetes, coronary artery disease, atrial fibrillation, who presented today for initial evaluation because of chronic leukocytosis.     Patient reports he has problem with leukocytes for many years and previously was seen by Dr. Ugo Nugent at [facility] and also had bone marrow aspiration and biopsy. The patient was told having some abnormalities with his blood, however, no need for treatment. He was followed by Dr. Nugent for a few times and then stopped going. I searched medical records in EMR system in [facility] and his last visit with Dr. Ugo Nugent was on 09/17/2017. According to the notes the patient had bone marrow aspiration and biopsy.     Patient originally was seen by Dr. Nugent on 02/15/2017 for leukocytosis. His peripheral blood flow cytometry study reported monoclonal T cell population, clonal T-LGL. Subsequently the patient had bone marrow aspiration and biopsy on 03/18/2017 with no obvious increase of blasts and also no increase of mast cell infiltrates. No evidence for metastatic tumor, granulomata or malignant lymphoma. No evidence for T-LGL leukemia. There was certainly maturation abnormalities noted in the megakaryocytic and erythroid lineage with less than 5% blasts. No excessive monoclonal T cell population. Chromosome study was normal male karyotype. FISH panel was negative for rearrangement of PDGFRB, FGFR1 and negative for deletion of 4q12. Dr. Nugent recommended annual followup with flow cytometry study.    I reviewed laboratory results  in the [facility] EMR system. CBC study from 02/13/2012 reported WBC 11,740 including neutrophils 5440, lymphocytes 4260, monocytes 860, eosinophils 910 and basophils 230. Had hemoglobin 16.0, hematocrit 48.1, and platelets 477,000. On 10/19/2012, patient had WBC 11,300 including ANC 6800, lymphocytes 4200, monocytes 400, normal platelets 375,000 and hemoglobin 16.2. Lab study on 04/16/2013 reported hemoglobin 16.0, platelets 367,000, WBC 10,900 including granulocytes 5600, lymphocytes 4500, monocytes 800.     On 02/15/2017 when he was seen by Dr. Ugo Nugent, laboratory study reported total WBC 13,730 including ANC 5360, lymphocytes 5230, monocytes 1380, eosinophils 1670 and basophils 90. Hemoglobin was 15.5, MCV was 91.5, and platelets 481,000. Normal C-reactive protein 0.8 mg/dL. Protein electrophoresis reported normal SPEP without M spike. Protein immunofixation was also negative. Had serum IgG 1116, IgA 370, decreased IgM 24 mg/dL. Peripheral blood flow cytometry study reported clonal T cell population in CD3 and CD8, positive T cells; also positive for CD2, CD11C, and CD5 (dim), CD7, CD56 (dim), CD45, CD57 (bright), CD26 (partial) and TCR alpha/beta.    This patient subsequently had bone marrow aspiration and biopsy on 03/13/2017 at [facility]. Chromosomal analysis reported normal male karyotype. FISH study was negative for deletion of 4q12 including CHIC2, and negative for fusion of FIP1L1 and PDGFRA genes. Also negative for rearrangement of PDGFRB, FGFR1. The bone marrow biopsy reported variably cellular bone marrow 30-40% with trilineage hematopoiesis. Decreased iron stores. The flow cytometry study of the bone marrow aspiration, however, did not show the clonal T-LGL population. No evidence for T-LGL leukemia or malignant lymphoma.     Lab study on 03/12/2018 reported WBC 12,400 with granulocytes 6500, lymphocytes 5300, monocytes 600 without eosinophils population.  Hemoglobin was 16.7, MCV 90.6, platelets 475,000.     CBC on 03/11/2019 reported WBC 12,470, granulocytes 4740, lymphocytes 2540, monocytes 1770 and eosinophils 1870, basophil 110. Atypical lymphocytes was 1430. Hemoglobin was 15.9, MCV 91.0, platelets 460,000.     CBC study on 07/01/2020 reported WBC 15,420, ANC 8110, lymphocytes 5130, monocytes 820, eosinophils 1220, basophils 140. Hemoglobin was 15.2 and platelets 415,000.     Laboratory study on 02/22/2021 reported WBC 15,740, ANC 6570, lymphocytes 2160, monocytes 360, eosinophils 2870, basophils 230, atypical lymphocytes 1550, platelets 451,000 and hemoglobin 13.7 with MCV 95.6, MCHC 31.7. His B12 level was 385 pg/mL. Chemistry lab reported glucose 121, otherwise unremarkable CMP including normal liver function panel.    Laboratory study on 06/21/2021 reported worsening anemia, hemoglobin 13.1, MCV 94.7, platelets 447,000, WBC 14,530 including ANC 6310, lymphocytes 4110, monocytes 770, eosinophils 1800, basophils 260, atypical lymphocytes 1280 and platelets 447,000. Vitamin B12 level was 435 pg/mL. No iron studies, no folate.     Laboratory study on 04/18/2022 reported hemoglobin 13.4, MCV 93.9, platelets 456,000, WBC 15,940 including ANC 7140 and lymphocytes 6220, monocytes 1090, eosinophils 1240, basophils 190, platelets 456,000. Hemoglobin 13.4, MCV 93.9, MCHC 31.1. CMP on the same day reported creatinine 1.12, glucose 111, calcium 10.3, total protein 7.5, albumin 4.8, globulin 2.6, otherwise unremarkable.     Laboratory study today in our clinic on 08/01/2022 reported further worsening anemia with hemoglobin 11.5, MCV 93.1, normal platelets 399,000, WBC 13,730 including neutrophils 4790, lymphocytes 5320, monocytes 1370, eosinophils 1910 and basophils 240, immature granulocytes 100.     I reviewed his peripheral blood smear today on 08/01/2022. There are significant anisocytosis of RBC, jose-shaped RBC, spike cells. Occasional schistocytes. No target cells.  No clustering of platelets. Morphology of WBC shows increased eosinophils and lymphocytes, however, no blasts. No increased metamyelocytes and promyelocytes.       Past Medical History:   Diagnosis Date    Acute MI, inferoposterior wall (HCC)     Atrial fibrillation (HCC)     BPH (benign prostatic hyperplasia)     DDD (degenerative disc disease), lumbar     Diabetes (HCC)     Elevated cholesterol     GERD (gastroesophageal reflux disease)     Head trauma 2007    Hemorrhoids     History of shingles 2012    History of snoring     HTN (hypertension)     Hyperlipidemia     Hypogonadism in male     Kidney stone     Muscle spasm     Ventricular fibrillation (HCC)      Past Surgical History:   Procedure Laterality Date    APPENDECTOMY      CARDIAC CATHETERIZATION N/A 08/18/2019    Procedure: Left Heart Cath;  Surgeon: Rod Gomes MD;  Location:  [facility];  Service: Cardiology    CARDIAC CATHETERIZATION N/A 08/18/2019    Procedure: Stent ROBY coronary;  Surgeon: Rod Gomes MD;  Location:  [facility];  Service: Cardiology    CARDIAC CATHETERIZATION N/A 08/18/2019    Procedure: Coronary angiography;  Surgeon: Rod Gomes MD;  Location:  [facility];  Service: Cardiology    CARDIAC CATHETERIZATION N/A 08/18/2019    Procedure: Left ventriculography;  Surgeon: Rod Gomes MD;  [facility];  Service: Cardiology    FEMUR FRACTURE SURGERY Right 1990    HAND SURGERY Right     ORIF FINGER / THUMB FRACTURE Right     SPLENECTOMY  2007    TONSILLECTOMY     Tonsillectomy 1962 in [place].  Appendectomy in 1977 in [place].  Right leg fracture and ORIF in 1989 in [place].  Splenectomy in June 2007 in [facility]  Cardiac catheterization with 2 stents placement August 2019 at [facility] Dr. Rod Gomes.  Risk for surgery in September 2021 at [facility] Dr. Moncho Velarde.  Colonoscopy  2010.    MEDICATIONS    Current Outpatient Medications:     acetaminophen (TYLENOL) 500 MG tablet, Take 1,000 mg by mouth., Disp: , Rfl:     atorvastatin (LIPITOR) 40 MG tablet, Take 1 tablet by mouth Every Night., Disp: 90 tablet, Rfl: 3    baclofen (LIORESAL) 10 MG tablet, Take 10 mg by mouth 3 (Three) Times a Day., Disp: , Rfl:     clopidogrel (PLAVIX) 75 MG tablet, Take 1 tablet by mouth Daily., Disp: 90 tablet, Rfl: 3    fluticasone (FLONASE) 50 MCG/ACT nasal spray, 2 sprays into the nostril(s) as directed by provider Daily As Needed for Rhinitis., Disp: , Rfl:     gabapentin (NEURONTIN) 300 MG capsule, , Disp: , Rfl:     hydroCHLOROthiazide (HYDRODIURIL) 25 MG tablet, Take 25 mg by mouth Daily., Disp: , Rfl:     HYDROcodone-acetaminophen (NORCO)  MG per tablet, Take 1 tablet by mouth Every 6 (Six) Hours As Needed for Moderate Pain ., Disp: , Rfl:     Krill Oil 1000 MG capsule, Take  by mouth., Disp: , Rfl:     lisinopril (PRINIVIL,ZESTRIL) 5 MG tablet, Take 5 mg by mouth Daily., Disp: , Rfl:     metoprolol succinate XL (TOPROL-XL) 25 MG 24 hr tablet, TAKE ONE AND ONE-HALF TABLETS DAILY, Disp: 135 tablet, Rfl: 2    pantoprazole (PROTONIX) 40 MG EC tablet, Take 40 mg by mouth Daily., Disp: , Rfl:     tamsulosin (FLOMAX) 0.4 MG capsule 24 hr capsule, Take 1 capsule by mouth Every Night., Disp: , Rfl:     Testosterone 20.25 MG/1.25GM (1.62%) gel, Place 20.25 mg on the skin as directed by provider., Disp: , Rfl:     ferrous sulfate 325 (65 FE) MG tablet, Take 1 tablet by mouth 3 (Three) Times a Day With Meals., Disp: 90 tablet, Rfl: 2    magnesium oxide (MAG-OX) 400 MG tablet, Take 1 tablet by mouth 2 (Two) Times a Day., Disp: 60 tablet, Rfl: 2    methocarbamol (ROBAXIN) 500 MG tablet, , Disp: , Rfl:     ALLERGIES:     Allergies   Allergen Reactions    Topiramate Other (See Comments)     irritability   Mild allergy to milk and dairy product.    SOCIAL HISTORY:       Social History     Socioeconomic History     "Marital status:    Tobacco Use    Smoking status: Former Smoker     Types: Cigarettes     Quit date: 1999     Years since quittin.9    Smokeless tobacco: Never Used    Tobacco comment: CAFFEINE USE: 1-2 16OZ COKES DAILY   Substance and Sexual Activity    Alcohol use: Yes     Alcohol/week: 3.0 standard drinks     Types: 3 Cans of beer per week     Comment: OCCASSIONALLY    Drug use: Defer    Sexual activity: Defer   Quit smoking , smoked 1-1/2 pack a day.  No vaping.      FAMILY HISTORY:  Family History   Problem Relation Age of Onset    Hypertension Mother     Heart disease Mother     Diabetes Mother     Heart failure Mother     Heart attack Mother    Mother had a hypertension, hyperlipidemia, heart attack, diabetes and obesity.  Aunt has diabetes.     REVIEW OF SYSTEMS:  Review of Systems   Constitutional: Positive for fatigue. Negative for unexpected weight change.   HENT: Positive for sinus pressure. Negative for mouth sores, sore throat, trouble swallowing and voice change.    Eyes: Negative for visual disturbance.   Respiratory: Negative for cough and shortness of breath.    Cardiovascular: Positive for palpitations (Atrial fibrillation).   Gastrointestinal: Negative for abdominal pain, blood in stool, constipation, diarrhea and nausea.   Endocrine: Negative for cold intolerance.   Genitourinary: Negative for dysuria and hematuria.   Musculoskeletal: Positive for arthralgias.        Limping   Skin: Negative for color change.        Pruritus   Allergic/Immunologic: Negative for immunocompromised state.   Neurological: Positive for numbness.   Hematological: Negative for adenopathy. Does not bruise/bleed easily.   Psychiatric/Behavioral: Negative for confusion.              Vitals:    22 1155   BP: 136/75   Pulse: 73   Temp: 97.1 °F (36.2 °C)   SpO2: 95%   Weight: 105 kg (231 lb)   Height: 177.8 cm (70\")     Current Status 2022   ECOG score 0          PHYSICAL EXAM:  "     CONSTITUTIONAL:  Vital signs reviewed.  Well-developed well-nourished male.  No distress, looks comfortable.  EYES:  Conjunctivae and lids unremarkable.    EARS,NOSE,MOUTH,THROAT:  Ears and nose appear unremarkable.  Patient wears mask due to the pandemic coronavirus infection.   RESPIRATORY:  Normal respiratory effort.  Lungs clear to auscultation bilaterally.  CARDIOVASCULAR:  Normal S1, S2.  No murmurs rubs or gallops.  No significant lower extremity edema.  GASTROINTESTINAL: Abdomen appears unremarkable.  Nontender.  No hepatomegaly.  No splenomegaly.  LYMPHATIC:  No cervical, supraclavicular, axillary lymphadenopathy.  MUSCULOSKELETAL:  Unremarkable gait and station.  Unremarkable digits/nails.  No cyanosis or clubbing.  SKIN:  Warm.  No rashes.  PSYCHIATRIC:  Normal judgment and insight.  Normal mood and affect.      RECENT LABS:        WBC   Date Value Ref Range Status   08/01/2022 13.73 (H) 3.40 - 10.80 10*3/mm3 Final   04/18/2022 15.94 (H) 4.5 - 11.0 10*3/uL Final   10/29/2020 15.38 (H) 4.5 - 11.0 10*3/uL Final   07/01/2020 15.42 (H) 4.5 - 11.0 10*3/uL Final   08/23/2019 15.18 (H) 3.40 - 10.80 10*3/mm3 Final   08/23/2019 15.18 (H) 3.40 - 10.80 10*3/mm3 Final   08/22/2019 15.64 (H) 3.40 - 10.80 10*3/mm3 Final   08/21/2019 17.89 (H) 3.40 - 10.80 10*3/mm3 Final   08/20/2019 21.31 (H) 3.40 - 10.80 10*3/mm3 Final   08/19/2019 17.11 (H) 3.40 - 10.80 10*3/mm3 Final   08/18/2019 16.30 (H) 3.40 - 10.80 10*3/mm3 Final   09/21/2018 15.1 (H) 4.5 - 11.0 10*3/uL Final   03/12/2018 12.4 (H) 4.5 - 11.0 10*3/uL Final     Hemoglobin   Date Value Ref Range Status   08/01/2022 11.5 (L) 13.0 - 17.7 g/dL Final   04/18/2022 13.4 (L) 13.5 - 17.5 g/dL Final   10/29/2020 15.4 13.5 - 17.5 g/dL Final   07/01/2020 15.2 13.5 - 17.5 g/dL Final   08/23/2019 12.7 (L) 13.0 - 17.7 g/dL Final   08/22/2019 12.1 (L) 13.0 - 17.7 g/dL Final   08/21/2019 13.5 13.0 - 17.7 g/dL Final   08/20/2019 14.4 13.0 - 17.7 g/dL Final   08/19/2019 14.8  13.0 - 17.7 g/dL Final   08/18/2019 15.9 13.0 - 17.7 g/dL Final   09/21/2018 16.1 13.5 - 17.5 g/dL Final   03/12/2018 16.7 13.5 - 17.5 g/dL Final     Platelets   Date Value Ref Range Status   08/01/2022 399 140 - 450 10*3/mm3 Final   04/18/2022 456 (H) 140 - 440 10*3/uL Final   10/29/2020 435 140 - 440 10*3/uL Final   07/01/2020 415 140 - 440 10*3/uL Final   08/23/2019 404 140 - 450 10*3/mm3 Final   08/22/2019 349 140 - 450 10*3/mm3 Final   08/21/2019 284 140 - 450 10*3/mm3 Final   08/20/2019 346 140 - 450 10*3/mm3 Final   08/19/2019 403 140 - 450 10*3/mm3 Final   08/18/2019 450 140 - 450 10*3/mm3 Final   09/21/2018 461 (H) 150 - 450 10*3/uL Final   03/12/2018 475 (H) 150 - 450 10*3/uL Final       Assessment & Plan     ASSESSMENT:    1. Leukocytosis with eosinophilia, monocytosis, and lymphocytosis.   This patient has problem since at least 2013 when he was seen by Dr. Ugo Nugent at [facility] in 2017 with both peripheral blood flow cytometry study in 02/2017 and subsequently bone marrow aspiration and biopsy in 03/2013 which reported no evidence for leukemia or lymphoma in the bone marrow despite previous peripheral blood showed monoclonal T cell LGL.   Bone marrow examination in March 2017, FISH study was negative for deletion of 4q12 including CHIC2, and negative for fusion of FIP1L1 and PDGFRA genes. Also negative for rearrangement of PDGFRB, FGFR1.  Reviewed his laboratory study, his WBC, eosinophilia, monocytosis, lymphocytosis are relatively stable with fluctuation in numbers.   Patient reports he is feeling relatively well. No fever, sweating or chills. He has no significant weight loss. I think we can monitor for now. I will focus on the anemia workup first. If there is no explainable reason for his anemia, we likely will need to repeat bone marrow aspiration and biopsy.   I do think this patient likely has myeloproliferative disorder.    2. Anemia.   This is relatively new for the past 2 years. His  hemoglobin has been gradually trending down.   He reports no melena or hematochezia. He does complain of fatigue.   I reviewed his peripheral blood smear today on 8/1/2022.  I suspect that he may have iron deficiency with jose-shaped RBC. I recommended to obtain laboratory studies first with serum ferritin, iron profile, vitamin B12, and folate. We will also check CMP and LDH.     3. Chronic renal insufficiency, stage 3. Patient reports he is waiting to see his nephrologist, Dr. Fidel Good, next week. He prefers we obtaining laboratory studies also to avoid repeat blood draw next week. We will check magnesium, phosphorus in conjunction with CMP today.       PLAN:  Laboratory studies today as mentioned:   - Folate  - Vitamin B12  - Iron Profile  - Ferritin  - Comprehensive Metabolic Panel  - Lactate Dehydrogenase  - Magnesium  - Phosphorus      ADDENDUM:      *. Laboratory study today reported iron deficiency with free iron 43, iron saturation 8%, TIBC 521 and ferritin 9.5 ng/mL. He had normal folate 8.7 ng/mL and low normal B12 level at 414 pg/mL. Also reported low magnesium at 1.6, creatinine 1.39, potassium 5.3, normal sodium, and calcium 9.2, and normal LDH at 187.     This patient clearly has iron deficiency.  No evidence for hemolysis.    *.  Hypomagnesemia.  Magnesium 1.6 today on 8/1/2022.  Etiology is not clear. Nevertheless I recommended to start oral magnesium oxide 400 mg twice a day.       PLAN:  We will start the patient on oral ferrous sulfate 325 mg 3 times a day as we discussed earlier.  I E scribed to his pharmacy.  We will also obtain stool for occult blood test.    He likely will need GI consult with colonoscopy examination and possible EGD.    Start oral magnesium 400 mg twice a day.  I E scribed to his pharmacy.  We will bring the patient back for reevaluation in 2 months for reassessment of response to iron treatment. We will repeat CBC, iron profile, and ferritin level.  - CBC &  Differential  - Ferritin  - Iron Profile  - Occult Blood X 3, Stool - Stool, Per Rectum      RONY GHOSH M.D., Ph.D.     8/1/2022.       CC:   MD Fidel Galindo MD

## 2022-08-23 LAB
COLLECT DATE SP2 STL: NORMAL
COLLECT DATE SP3 STL: NORMAL
COLLECT DATE STL: NORMAL
GASTROCULT GAST QL: NEGATIVE
HEMOCCULT SP2 STL QL: NEGATIVE
HEMOCCULT SP3 STL QL: NEGATIVE
Lab: 12

## 2022-08-23 PROCEDURE — 82270 OCCULT BLOOD FECES: CPT

## 2022-10-10 ENCOUNTER — LAB (OUTPATIENT)
Dept: LAB | Facility: HOSPITAL | Age: 67
End: 2022-10-10

## 2022-10-10 ENCOUNTER — OFFICE VISIT (OUTPATIENT)
Dept: ONCOLOGY | Facility: CLINIC | Age: 67
End: 2022-10-10

## 2022-10-10 VITALS
WEIGHT: 223.1 LBS | DIASTOLIC BLOOD PRESSURE: 73 MMHG | SYSTOLIC BLOOD PRESSURE: 109 MMHG | TEMPERATURE: 98.4 F | RESPIRATION RATE: 16 BRPM | HEIGHT: 70 IN | BODY MASS INDEX: 31.94 KG/M2 | OXYGEN SATURATION: 96 % | HEART RATE: 63 BPM

## 2022-10-10 DIAGNOSIS — D50.9 IRON DEFICIENCY ANEMIA, UNSPECIFIED IRON DEFICIENCY ANEMIA TYPE: Primary | ICD-10-CM

## 2022-10-10 DIAGNOSIS — E83.42 HYPOMAGNESEMIA: ICD-10-CM

## 2022-10-10 DIAGNOSIS — D64.9 ANEMIA, UNSPECIFIED TYPE: ICD-10-CM

## 2022-10-10 DIAGNOSIS — D50.9 IRON DEFICIENCY ANEMIA, UNSPECIFIED IRON DEFICIENCY ANEMIA TYPE: ICD-10-CM

## 2022-10-10 DIAGNOSIS — D72.829 LEUKOCYTOSIS, UNSPECIFIED TYPE: ICD-10-CM

## 2022-10-10 LAB
BASOPHILS # BLD AUTO: 0.1 10*3/MM3 (ref 0–0.2)
BASOPHILS NFR BLD AUTO: 0.9 % (ref 0–1.5)
DEPRECATED RDW RBC AUTO: 54.9 FL (ref 37–54)
EOSINOPHIL # BLD AUTO: 0.95 10*3/MM3 (ref 0–0.4)
EOSINOPHIL NFR BLD AUTO: 8.2 % (ref 0.3–6.2)
ERYTHROCYTE [DISTWIDTH] IN BLOOD BY AUTOMATED COUNT: 16 % (ref 12.3–15.4)
FERRITIN SERPL-MCNC: 55.6 NG/ML (ref 30–400)
HCT VFR BLD AUTO: 41.4 % (ref 37.5–51)
HGB BLD-MCNC: 13.3 G/DL (ref 13–17.7)
IMM GRANULOCYTES # BLD AUTO: 0.04 10*3/MM3 (ref 0–0.05)
IMM GRANULOCYTES NFR BLD AUTO: 0.3 % (ref 0–0.5)
IRON 24H UR-MRATE: 68 MCG/DL (ref 59–158)
IRON SATN MFR SERPL: 17 % (ref 14–48)
LYMPHOCYTES # BLD AUTO: 4.15 10*3/MM3 (ref 0.7–3.1)
LYMPHOCYTES NFR BLD AUTO: 35.7 % (ref 19.6–45.3)
MAGNESIUM SERPL-MCNC: 1.6 MG/DL (ref 1.8–2.5)
MCH RBC QN AUTO: 30.2 PG (ref 26.6–33)
MCHC RBC AUTO-ENTMCNC: 32.1 G/DL (ref 31.5–35.7)
MCV RBC AUTO: 94.1 FL (ref 79–97)
MONOCYTES # BLD AUTO: 1.1 10*3/MM3 (ref 0.1–0.9)
MONOCYTES NFR BLD AUTO: 9.5 % (ref 5–12)
NEUTROPHILS NFR BLD AUTO: 45.4 % (ref 42.7–76)
NEUTROPHILS NFR BLD AUTO: 5.3 10*3/MM3 (ref 1.7–7)
NRBC BLD AUTO-RTO: 0 /100 WBC (ref 0–0.2)
PLATELET # BLD AUTO: 361 10*3/MM3 (ref 140–450)
PMV BLD AUTO: 10.7 FL (ref 6–12)
RBC # BLD AUTO: 4.4 10*6/MM3 (ref 4.14–5.8)
TIBC SERPL-MCNC: 393 MCG/DL (ref 249–505)
TRANSFERRIN SERPL-MCNC: 281 MG/DL (ref 200–360)
WBC NRBC COR # BLD: 11.64 10*3/MM3 (ref 3.4–10.8)

## 2022-10-10 PROCEDURE — 83540 ASSAY OF IRON: CPT

## 2022-10-10 PROCEDURE — 99214 OFFICE O/P EST MOD 30 MIN: CPT | Performed by: INTERNAL MEDICINE

## 2022-10-10 PROCEDURE — 85025 COMPLETE CBC W/AUTO DIFF WBC: CPT

## 2022-10-10 PROCEDURE — 36415 COLL VENOUS BLD VENIPUNCTURE: CPT

## 2022-10-10 PROCEDURE — 82728 ASSAY OF FERRITIN: CPT

## 2022-10-10 PROCEDURE — 83735 ASSAY OF MAGNESIUM: CPT

## 2022-10-10 PROCEDURE — 84466 ASSAY OF TRANSFERRIN: CPT

## 2022-10-10 RX ORDER — FERROUS SULFATE 325(65) MG
325 TABLET ORAL 2 TIMES DAILY WITH MEALS
Qty: 180 TABLET | Refills: 1 | Status: SHIPPED | OUTPATIENT
Start: 2022-10-10

## 2022-10-10 RX ORDER — MAGNESIUM OXIDE 400 MG/1
800 TABLET ORAL 2 TIMES DAILY
Qty: 360 TABLET | Refills: 1 | Status: SHIPPED | OUTPATIENT
Start: 2022-10-10

## 2022-10-10 NOTE — PROGRESS NOTES
.     REASON FOR FOLLOWUP:     * Chronic leukocytosis.   *Iron deficiency anemia, started on oral iron 8/1/2022.  *Hypomagnesemia.  Started on oral magnesium 8/1/2022.      HISTORY OF PRESENT ILLNESS:  The patient is a 67 y.o. year old male with hypertension, hyperlipidemia, diabetes, coronary artery disease, atrial fibrillation, who presented today for 10-week reevaluation to assess his response to oral iron treatment.    I saw him originally on 8/1/2022 for initial evaluation because of chronic leukocytosis.  Laboratory studies reported severe iron deficiency with a ferritin 9.5 ng/mL, iron saturation 8% with free iron 43 TIBC 521.  Patient had a normal folate and mediocre B12 level.  We will start the patient on ferrous sulfate 325 mg 3 times a day.    She also was found to having hypomagnesemia 1.6 and we started patient on oral magnesium 400 mg twice a day.    His stool samples submitted on 8/23/2022 was tested negative for occult blood.      Patient reports he has been tolerating well with oral iron treatment and no nausea vomiting or constipation.  Patient also has been compliant with oral magnesium.    Patient reports dark stool after starting oral iron treatment.  Patient otherwise no specific complaints.  He denies fever sweating chills.  No headaches no vision changes.    Laboratory study today on 10/10/2022 reported much improved ferritin 55.6 ng/mL, iron saturation 17% with free iron 68 and iron saturation 393 together with improved hemoglobin 13.3.  However he has persistent mild leukocytosis WBC 11,640, including ANC 5300 lymphocytes 4150, monocytes 1100 and eosinophil 950.            Past Medical History:   Diagnosis Date   • Acute MI, inferoposterior wall (HCC)    • Atrial fibrillation (HCC)    • BPH (benign prostatic hyperplasia)    • DDD (degenerative disc disease), lumbar    • Diabetes (HCC)    • Elevated cholesterol    • GERD (gastroesophageal reflux disease)    • Head trauma 2007   •  Hemorrhoids    • History of shingles 2012   • History of snoring    • HTN (hypertension)    • Hyperlipidemia    • Hypogonadism in male    • Kidney stone    • Muscle spasm    • Ventricular fibrillation (HCC)      Past Surgical History:   Procedure Laterality Date   • APPENDECTOMY     • CARDIAC CATHETERIZATION N/A 08/18/2019    Procedure: Left Heart Cath;  Surgeon: Rod Gomes MD;  [facility];  Service: Cardiology   • CARDIAC CATHETERIZATION N/A 08/18/2019    Procedure: Stent ROBY coronary;  Surgeon: Rod Gomes MD;  Location:  [facility];  Service: Cardiology   • CARDIAC CATHETERIZATION N/A 08/18/2019    Procedure: Coronary angiography;  Surgeon: Rod Gomes MD;  [facility];  Service: Cardiology   • CARDIAC CATHETERIZATION N/A 08/18/2019    Procedure: Left ventriculography;  Surgeon: Rod Gomes MD;  Location:  [facility];  Service: Cardiology   • COLONOSCOPY  2010   • FEMUR FRACTURE SURGERY Right 1990   • HAND SURGERY Right    • ORIF FINGER / THUMB FRACTURE Right    • SPLENECTOMY  2007   • TONSILLECTOMY     · Tonsillectomy 1962 in [place].  · Appendectomy in 1977 in [place].  · Right leg fracture and ORIF in 1989 in [place].  · Splenectomy in June 2007 in [facility]  · Cardiac catheterization with 2 stents placement August 2019 at [facility] Dr. Rod Gomes.  · Risk for surgery in September 2021 at [facility] Dr. Moncho Velarde.  · Colonoscopy 2010.      HEMATOLOGY HISTORY: The patient is a 67 y.o. year old male with hypertension, hyperlipidemia, diabetes, coronary artery disease, atrial fibrillation, who presented on 8/1/2022 for initial evaluation because of chronic leukocytosis.     Patient reports he has problem with leukocytes for many years and previously was seen by Dr. Ugo Nugent at [facility] and also had bone marrow aspiration and biopsy.  The patient was told having some abnormalities with his blood, however, no need for treatment. He was followed by Dr. Nugent for a few times and then stopped going. I searched medical records in EMR system in [facility] and his last visit with Dr. Ugo Nugent was on 09/17/2017. According to the notes the patient had bone marrow aspiration and biopsy.     Patient originally was seen by Dr. Nugent on 02/15/2017 for leukocytosis. His peripheral blood flow cytometry study reported monoclonal T cell population, clonal T-LGL. Subsequently the patient had bone marrow aspiration and biopsy on 03/18/2017 with no obvious increase of blasts and also no increase of mast cell infiltrates. No evidence for metastatic tumor, granulomata or malignant lymphoma. No evidence for T-LGL leukemia. There was certainly maturation abnormalities noted in the megakaryocytic and erythroid lineage with less than 5% blasts. No excessive monoclonal T cell population. Chromosome study was normal male karyotype. FISH panel was negative for rearrangement of PDGFRB, FGFR1 and negative for deletion of 4q12. Dr. Nugent recommended annual followup with flow cytometry study.    I reviewed laboratory results in the [facility] EMR system. CBC study from 02/13/2012 reported WBC 11,740 including neutrophils 5440, lymphocytes 4260, monocytes 860, eosinophils 910 and basophils 230. Had hemoglobin 16.0, hematocrit 48.1, and platelets 477,000. On 10/19/2012, patient had WBC 11,300 including ANC 6800, lymphocytes 4200, monocytes 400, normal platelets 375,000 and hemoglobin 16.2. Lab study on 04/16/2013 reported hemoglobin 16.0, platelets 367,000, WBC 10,900 including granulocytes 5600, lymphocytes 4500, monocytes 800.     On 02/15/2017 when he was seen by Dr. Ugo Nugent, laboratory study reported total WBC 13,730 including ANC 5360, lymphocytes 5230, monocytes 1380, eosinophils 1670 and basophils 90. Hemoglobin was 15.5, MCV was 91.5, and  platelets 481,000. Normal C-reactive protein 0.8 mg/dL. Protein electrophoresis reported normal SPEP without M spike. Protein immunofixation was also negative. Had serum IgG 1116, IgA 370, decreased IgM 24 mg/dL. Peripheral blood flow cytometry study reported clonal T cell population in CD3 and CD8, positive T cells; also positive for CD2, CD11C, and CD5 (dim), CD7, CD56 (dim), CD45, CD57 (bright), CD26 (partial) and TCR alpha/beta.    This patient subsequently had bone marrow aspiration and biopsy on 03/13/2017 at [facility]. Chromosomal analysis reported normal male karyotype. FISH study was negative for deletion of 4q12 including CHIC2, and negative for fusion of FIP1L1 and PDGFRA genes. Also negative for rearrangement of PDGFRB, FGFR1. The bone marrow biopsy reported variably cellular bone marrow 30-40% with trilineage hematopoiesis. Decreased iron stores. The flow cytometry study of the bone marrow aspiration, however, did not show the clonal T-LGL population. No evidence for T-LGL leukemia or malignant lymphoma.     Lab study on 03/12/2018 reported WBC 12,400 with granulocytes 6500, lymphocytes 5300, monocytes 600 without eosinophils population. Hemoglobin was 16.7, MCV 90.6, platelets 475,000.     CBC on 03/11/2019 reported WBC 12,470, granulocytes 4740, lymphocytes 2540, monocytes 1770 and eosinophils 1870, basophil 110. Atypical lymphocytes was 1430. Hemoglobin was 15.9, MCV 91.0, platelets 460,000.     CBC study on 07/01/2020 reported WBC 15,420, ANC 8110, lymphocytes 5130, monocytes 820, eosinophils 1220, basophils 140. Hemoglobin was 15.2 and platelets 415,000.     Laboratory study on 02/22/2021 reported WBC 15,740, ANC 6570, lymphocytes 2160, monocytes 360, eosinophils 2870, basophils 230, atypical lymphocytes 1550, platelets 451,000 and hemoglobin 13.7 with MCV 95.6, MCHC 31.7. His B12 level was 385 pg/mL. Chemistry lab reported glucose 121, otherwise unremarkable CMP  including normal liver function panel.    Laboratory study on 06/21/2021 reported worsening anemia, hemoglobin 13.1, MCV 94.7, platelets 447,000, WBC 14,530 including ANC 6310, lymphocytes 4110, monocytes 770, eosinophils 1800, basophils 260, atypical lymphocytes 1280 and platelets 447,000. Vitamin B12 level was 435 pg/mL. No iron studies, no folate.     Laboratory study on 04/18/2022 reported hemoglobin 13.4, MCV 93.9, platelets 456,000, WBC 15,940 including ANC 7140 and lymphocytes 6220, monocytes 1090, eosinophils 1240, basophils 190, platelets 456,000. Hemoglobin 13.4, MCV 93.9, MCHC 31.1. CMP on the same day reported creatinine 1.12, glucose 111, calcium 10.3, total protein 7.5, albumin 4.8, globulin 2.6, otherwise unremarkable.     Laboratory study today in our clinic on 08/01/2022 reported further worsening anemia with hemoglobin 11.5, MCV 93.1, normal platelets 399,000, WBC 13,730 including neutrophils 4790, lymphocytes 5320, monocytes 1370, eosinophils 1910 and basophils 240, immature granulocytes 100.     I reviewed his peripheral blood smear on 08/01/2022. There are significant anisocytosis of RBC, jose-shaped RBC, spike cells. Occasional schistocytes. No target cells. No clustering of platelets. Morphology of WBC shows increased eosinophils and lymphocytes, however, no blasts. No increased metamyelocytes and promyelocytes.           MEDICATIONS    Current Outpatient Medications:   •  acetaminophen (TYLENOL) 500 MG tablet, Take 1,000 mg by mouth., Disp: , Rfl:   •  atorvastatin (LIPITOR) 40 MG tablet, Take 1 tablet by mouth Every Night., Disp: 90 tablet, Rfl: 3  •  baclofen (LIORESAL) 10 MG tablet, Take 10 mg by mouth 3 (Three) Times a Day., Disp: , Rfl:   •  clopidogrel (PLAVIX) 75 MG tablet, Take 1 tablet by mouth Daily., Disp: 90 tablet, Rfl: 3  •  ferrous sulfate 325 (65 FE) MG tablet, Take 1 tablet by mouth 3 (Three) Times a Day With Meals., Disp: 90 tablet, Rfl: 2  •  fluticasone (FLONASE) 50 MCG/ACT  nasal spray, 2 sprays into the nostril(s) as directed by provider Daily As Needed for Rhinitis., Disp: , Rfl:   •  gabapentin (NEURONTIN) 300 MG capsule, , Disp: , Rfl:   •  hydroCHLOROthiazide (HYDRODIURIL) 25 MG tablet, Take 25 mg by mouth Daily., Disp: , Rfl:   •  HYDROcodone-acetaminophen (NORCO)  MG per tablet, Take 1 tablet by mouth Every 6 (Six) Hours As Needed for Moderate Pain ., Disp: , Rfl:   •  Krill Oil 1000 MG capsule, Take  by mouth., Disp: , Rfl:   •  lisinopril (PRINIVIL,ZESTRIL) 5 MG tablet, Take 5 mg by mouth Daily., Disp: , Rfl:   •  magnesium oxide (MAG-OX) 400 MG tablet, Take 1 tablet by mouth 2 (Two) Times a Day., Disp: 60 tablet, Rfl: 2  •  methocarbamol (ROBAXIN) 500 MG tablet, , Disp: , Rfl:   •  metoprolol succinate XL (TOPROL-XL) 25 MG 24 hr tablet, TAKE ONE AND ONE-HALF TABLETS DAILY, Disp: 135 tablet, Rfl: 2  •  pantoprazole (PROTONIX) 40 MG EC tablet, Take 40 mg by mouth Daily., Disp: , Rfl:   •  tamsulosin (FLOMAX) 0.4 MG capsule 24 hr capsule, Take 1 capsule by mouth Every Night., Disp: , Rfl:   •  Testosterone 20.25 MG/1.25GM (1.62%) gel, Place 20.25 mg on the skin as directed by provider., Disp: , Rfl:     ALLERGIES:     Allergies   Allergen Reactions   • Topiramate Other (See Comments)     irritability   Mild allergy to milk and dairy product.    SOCIAL HISTORY:       Social History     Socioeconomic History   • Marital status:      Spouse name: Elvira   Tobacco Use   • Smoking status: Former     Packs/day: 1.50     Types: Cigarettes     Quit date: 1999     Years since quittin.1   • Smokeless tobacco: Never   • Tobacco comments:     CAFFEINE USE: 1-2 16OZ COKES DAILY   Substance and Sexual Activity   • Alcohol use: Yes     Alcohol/week: 3.0 standard drinks     Types: 3 Cans of beer per week     Comment: OCCASSIONALLY   • Drug use: Never   • Sexual activity: Defer   · Quit smoking , smoked 1-1/2 pack a day.  No vaping.      FAMILY HISTORY:  Family History  "  Problem Relation Age of Onset   • Hyperlipidemia Mother    • Hypertension Mother    • Heart disease Mother    • Diabetes Mother    • Heart failure Mother    • Heart attack Mother    · Mother had a hypertension, hyperlipidemia, heart attack, diabetes and obesity.  · Aunt has diabetes.     REVIEW OF SYSTEMS:  Review of Systems   Constitutional: Negative for fatigue (This has significant improved) and unexpected weight change.   HENT: Negative for mouth sores, sinus pressure, sore throat and trouble swallowing.    Eyes: Negative for visual disturbance.   Respiratory: Negative for cough and shortness of breath.    Cardiovascular: Positive for palpitations (Atrial fibrillation).   Gastrointestinal: Negative for abdominal pain, blood in stool, constipation, diarrhea and nausea.   Endocrine: Negative for cold intolerance.   Genitourinary: Negative for dysuria and hematuria.   Musculoskeletal: Positive for arthralgias.        Limping   Skin: Negative for color change.        Pruritus   Allergic/Immunologic: Negative for immunocompromised state.   Neurological: Positive for numbness.   Hematological: Negative for adenopathy. Does not bruise/bleed easily.   Psychiatric/Behavioral: Negative for confusion.              Vitals:    10/10/22 1547   BP: 109/73   Pulse: 63   Resp: 16   Temp: 98.4 °F (36.9 °C)   TempSrc: Temporal   SpO2: 96%   Weight: 101 kg (223 lb 1.6 oz)   Height: 177.8 cm (70\")   PainSc: 0-No pain     Current Status 10/10/2022   ECOG score 0          PHYSICAL EXAM:    GENERAL:  Well-developed, well-nourished male in no acute distress.  Orientated to time place and the people.  SKIN:  Warm, dry without rashes, purpura or petechiae.  HEENT:  Normocephalic.  Wearing mask.   LYMPHATICS:  No cervical, supraclavicular adenopathy.  CHEST: Normal respiratory effort.  Lungs clear to auscultation. Good airflow.  CARDIAC:  Regular rate and rhythm without murmurs. Normal S1,S2.  ABDOMEN:  Soft, nontender with no " organomegaly or masses.  Bowel sounds normal.  EXTREMITIES:  No clubbing, cyanosis or edema.  NEUROLOGICAL:  Grossly intact.  No focal neurological deficits.  PSYCHIATRIC:  Normal affect and mood.        RECENT LABS:        WBC   Date Value Ref Range Status   10/10/2022 11.64 (H) 3.40 - 10.80 10*3/mm3 Final   08/01/2022 13.73 (H) 3.40 - 10.80 10*3/mm3 Final   04/18/2022 15.94 (H) 4.5 - 11.0 10*3/uL Final   10/29/2020 15.38 (H) 4.5 - 11.0 10*3/uL Final   07/01/2020 15.42 (H) 4.5 - 11.0 10*3/uL Final   08/23/2019 15.18 (H) 3.40 - 10.80 10*3/mm3 Final   08/23/2019 15.18 (H) 3.40 - 10.80 10*3/mm3 Final   08/22/2019 15.64 (H) 3.40 - 10.80 10*3/mm3 Final   08/21/2019 17.89 (H) 3.40 - 10.80 10*3/mm3 Final   08/20/2019 21.31 (H) 3.40 - 10.80 10*3/mm3 Final   08/19/2019 17.11 (H) 3.40 - 10.80 10*3/mm3 Final   08/18/2019 16.30 (H) 3.40 - 10.80 10*3/mm3 Final   09/21/2018 15.1 (H) 4.5 - 11.0 10*3/uL Final   03/12/2018 12.4 (H) 4.5 - 11.0 10*3/uL Final     Hemoglobin   Date Value Ref Range Status   10/10/2022 13.3 13.0 - 17.7 g/dL Final   08/01/2022 11.5 (L) 13.0 - 17.7 g/dL Final   04/18/2022 13.4 (L) 13.5 - 17.5 g/dL Final   10/29/2020 15.4 13.5 - 17.5 g/dL Final   07/01/2020 15.2 13.5 - 17.5 g/dL Final   08/23/2019 12.7 (L) 13.0 - 17.7 g/dL Final   08/22/2019 12.1 (L) 13.0 - 17.7 g/dL Final   08/21/2019 13.5 13.0 - 17.7 g/dL Final   08/20/2019 14.4 13.0 - 17.7 g/dL Final   08/19/2019 14.8 13.0 - 17.7 g/dL Final   08/18/2019 15.9 13.0 - 17.7 g/dL Final   09/21/2018 16.1 13.5 - 17.5 g/dL Final   03/12/2018 16.7 13.5 - 17.5 g/dL Final     Platelets   Date Value Ref Range Status   10/10/2022 361 140 - 450 10*3/mm3 Final   08/01/2022 399 140 - 450 10*3/mm3 Final   04/18/2022 456 (H) 140 - 440 10*3/uL Final   10/29/2020 435 140 - 440 10*3/uL Final   07/01/2020 415 140 - 440 10*3/uL Final   08/23/2019 404 140 - 450 10*3/mm3 Final   08/22/2019 349 140 - 450 10*3/mm3 Final   08/21/2019 284 140 - 450 10*3/mm3 Final   08/20/2019 346  140 - 450 10*3/mm3 Final   08/19/2019 403 140 - 450 10*3/mm3 Final   08/18/2019 450 140 - 450 10*3/mm3 Final   09/21/2018 461 (H) 150 - 450 10*3/uL Final   03/12/2018 475 (H) 150 - 450 10*3/uL Final       Assessment & Plan     ASSESSMENT:    *. Leukocytosis with eosinophilia, monocytosis, and lymphocytosis.   • This patient has problem since at least 2013 when he was seen by Dr. Ugo Nugent at [facility] in 2017 with both peripheral blood flow cytometry study in 02/2017 and subsequently bone marrow aspiration and biopsy in 03/2013 which reported no evidence for leukemia or lymphoma in the bone marrow despite previous peripheral blood showed monoclonal T cell LGL.   • Bone marrow examination in March 2017, FISH study was negative for deletion of 4q12 including CHIC2, and negative for fusion of FIP1L1 and PDGFRA genes. Also negative for rearrangement of PDGFRB, FGFR1.  • Reviewed his laboratory studies, his WBC, eosinophilia, monocytosis, lymphocytosis are relatively stable with fluctuation in numbers.   • Patient reports on 8/1/2022 he is feeling relatively well. No fever, sweating or chills. He has no significant weight loss. I think we can monitor for now. I will focus on the anemia workup first. If there is no explainable reason for his anemia, we likely will need to repeat bone marrow aspiration and biopsy.   • I do think this patient likely has myeloproliferative disorder.    *. Anemia.   • This is relatively new for the past 2 years. His hemoglobin has been gradually trending down.   • He reports no melena or hematochezia. He does complain of fatigue.   • I reviewed his peripheral blood smear on 8/1/2022.  I suspect that he may have iron deficiency with jose-shaped RBC. I recommended to obtain laboratory studies first with serum ferritin, iron profile, vitamin B12, and folate. We will also check CMP and LDH.   • 8/1/2022 lab studies reported iron deficiency with ferritin of 105 iron saturation 8% free  iron 43 TIBC 400, B12 level 114 and folate level 8.7.  Patient was started on oral ferrous sulfate 325 mg.    • On 10/10/2022 patient reports excellent tolerance to oral iron treatment.  Iron study also showed much improved ferritin 55.6 and low normal iron saturation 17%.  He also has much improved hemoglobin 13.3.  •     *. Laboratory study today reported iron deficiency with free iron 43, iron saturation 8%, TIBC 521 and ferritin 9.5 ng/mL. He had normal folate 8.7 ng/mL and low normal B12 level at 414 pg/mL. Also reported low magnesium at 1.6, creatinine 1.39, potassium 5.3, normal sodium, and calcium 9.2, and normal LDH at 187.     This patient clearly has iron deficiency.  No evidence for hemolysis.    *.  Hypomagnesemia.  Magnesium 1.6 today on 8/1/2022.  Etiology is not clear. Nevertheless I recommended to start oral magnesium oxide 400 mg twice a day.    · On 10/10/2022 persistent low magnesium 1.6.  Discussed with patient, will increase oral magnesium approximately 200 mg twice daily.    3. Chronic renal insufficiency, stage 3. Patient reports he is waiting to see his nephrologist, Dr. Fidel Good. He prefers we obtaining laboratory studies also to avoid repeat blood draw next week. We will check magnesium, phosphorus in conjunction with CMP today.   · Patient has much improved hemoglobin on 10/10/2022.  We will decrease oral iron supplement.    •     PLAN:    1. Decrease oral iron to twice a day.  I E scribed to his pharmacy.  2. Increase oral magnesium to 800 mg twice a day.  I E scribed to his pharmacy.  3. He likely will need GI consult with colonoscopy examination and possible EGD.  We will make the referral.  4. We will bring the patient back for reevaluation in 6 months for reassessment of response to iron treatment. We will repeat CBC, iron profile, and ferritin level.      I reviewed the laboratory results with the patient and discussed further management plan.  She voiced understanding and  agreeable.    RONY GHOSH M.D., Ph.D.     10/10/2022.      CC:   MD Fidel Galindo MD

## 2022-10-21 ENCOUNTER — OFFICE VISIT (OUTPATIENT)
Dept: CARDIOLOGY | Facility: CLINIC | Age: 67
End: 2022-10-21

## 2022-10-21 VITALS
HEIGHT: 70 IN | BODY MASS INDEX: 31.92 KG/M2 | DIASTOLIC BLOOD PRESSURE: 100 MMHG | SYSTOLIC BLOOD PRESSURE: 132 MMHG | HEART RATE: 57 BPM | WEIGHT: 223 LBS

## 2022-10-21 DIAGNOSIS — I48.20 CHRONIC A-FIB: ICD-10-CM

## 2022-10-21 DIAGNOSIS — E78.5 HYPERLIPIDEMIA, UNSPECIFIED HYPERLIPIDEMIA TYPE: ICD-10-CM

## 2022-10-21 DIAGNOSIS — I49.01 VENTRICULAR FIBRILLATION: ICD-10-CM

## 2022-10-21 DIAGNOSIS — I21.19: Primary | ICD-10-CM

## 2022-10-21 PROCEDURE — 99214 OFFICE O/P EST MOD 30 MIN: CPT | Performed by: INTERNAL MEDICINE

## 2022-10-21 PROCEDURE — 93000 ELECTROCARDIOGRAM COMPLETE: CPT | Performed by: INTERNAL MEDICINE

## 2022-10-21 RX ORDER — METOPROLOL SUCCINATE 25 MG/1
25 TABLET, EXTENDED RELEASE ORAL DAILY
COMMUNITY

## 2022-10-21 NOTE — PROGRESS NOTES
Date of Office Visit: 10/21/22  Encounter Provider: Rod Gomes MD  Place of Service: [facility] CARDIOLOGY  Patient Name: Frank Patel  :[id number]  [id number]    Chief Complaint   Patient presents with   • Coronary Artery Disease   :     HPI: Frank Patel is a 67 y.o. male  In 2019 he had an inferior MI with VF arrest and complicated by severe no reflow very large RCA stented did not have a lot of other disease in his other coronaries LV function at the time of the MI was about 35%.  Follow-up echo showed LV function had normalized.  He also has chronic A. fib and we have not anticoagulated him because he has had some lower GI bleeding.  He has been doing well he denies any chest pain shortness of breath PND orthopnea edema he had a little bit of barbecue that caused some indigestion and and become a sharp stabbing pain but nothing bad he still is having some hemorrhoidal bleeding    Past Medical History:   Diagnosis Date   • Acute MI, inferoposterior wall (HCC)    • Atrial fibrillation (HCC)    • BPH (benign prostatic hyperplasia)    • DDD (degenerative disc disease), lumbar    • Diabetes (HCC)    • Elevated cholesterol    • GERD (gastroesophageal reflux disease)    • Head trauma    • Hemorrhoids    • History of shingles    • History of snoring    • HTN (hypertension)    • Hyperlipidemia    • Hypogonadism in male    • Kidney stone    • Muscle spasm    • Ventricular fibrillation (HCC)        Past Surgical History:   Procedure Laterality Date   • APPENDECTOMY     • CARDIAC CATHETERIZATION N/A 2019    Procedure: Left Heart Cath;  Surgeon: Rod Gomes MD;  Location:  [facility] C[facility];  Service: Cardiology   • CARDIAC CATHETERIZATION N/A 2019    Procedure: Stent ROBY coronary;  Surgeon: Rod Gomes MD;  Location: [facility] CATH INVASIVE LOCATION;  Service: Cardiology   • CARDIAC CATHETERIZATION N/A 2019    Procedure: Coronary  angiography;  Surgeon: Rod Gomes MD;  Location:  [facility] C[facility] INVASIVE [facility];  Service: Cardiology   • CARDIAC CATHETERIZATION N/A 2019    Procedure: Left ventriculography;  Surgeon: Rod Gomes MD;  Location:  [facility] CATH INVASIVE [facility]ATION;  Service: Cardiology   • COLONOSCOPY     • FEMUR FRACTURE SURGERY Right    • HAND SURGERY Right    • ORIF FINGER / THUMB FRACTURE Right    • SPLENECTOMY     • TONSILLECTOMY         Social History     Socioeconomic History   • Marital status:      Spouse name: Elvira   Tobacco Use   • Smoking status: Former     Packs/day: 1.50     Types: Cigarettes     Quit date: 1999     Years since quittin.1   • Smokeless tobacco: Never   • Tobacco comments:     CAFFEINE USE: 1-2 16OZ COKES DAILY   Substance and Sexual Activity   • Alcohol use: Yes     Alcohol/week: 3.0 standard drinks     Types: 3 Cans of beer per week     Comment: OCCASSIONALLY   • Drug use: Never   • Sexual activity: Defer       Family History   Problem Relation Age of Onset   • Hyperlipidemia Mother    • Hypertension Mother    • Heart disease Mother    • Diabetes Mother    • Heart failure Mother    • Heart attack Mother        Review of Systems   Constitutional: Negative for decreased appetite, fever, malaise/fatigue and weight loss.   HENT: Negative for nosebleeds.    Eyes: Negative for double vision.   Cardiovascular: Negative for chest pain, claudication, cyanosis, dyspnea on exertion, irregular heartbeat, leg swelling, near-syncope, orthopnea, palpitations, paroxysmal nocturnal dyspnea and syncope.   Respiratory: Negative for cough, hemoptysis and shortness of breath.    Hematologic/Lymphatic: Negative for bleeding problem.   Skin: Negative for rash.   Musculoskeletal: Negative for falls and myalgias.   Gastrointestinal: Negative for hematochezia, jaundice, melena, nausea and vomiting.   Genitourinary: Negative for hematuria.   Neurological: Negative for dizziness and  seizures.   Psychiatric/Behavioral: Negative for altered mental status and memory loss.       Allergies   Allergen Reactions   • Topiramate Other (See Comments)     irritability         Current Outpatient Medications:   •  atorvastatin (LIPITOR) 40 MG tablet, Take 1 tablet by mouth Every Night., Disp: 90 tablet, Rfl: 3  •  baclofen (LIORESAL) 10 MG tablet, Take 10 mg by mouth 3 (Three) Times a Day., Disp: , Rfl:   •  clopidogrel (PLAVIX) 75 MG tablet, Take 1 tablet by mouth Daily., Disp: 90 tablet, Rfl: 3  •  ferrous sulfate 325 (65 FE) MG tablet, Take 1 tablet by mouth 2 (Two) Times a Day With Meals., Disp: 180 tablet, Rfl: 1  •  fluticasone (FLONASE) 50 MCG/ACT nasal spray, 2 sprays into the nostril(s) as directed by provider Daily As Needed for Rhinitis., Disp: , Rfl:   •  gabapentin (NEURONTIN) 300 MG capsule, , Disp: , Rfl:   •  hydroCHLOROthiazide (HYDRODIURIL) 25 MG tablet, Take 25 mg by mouth Daily., Disp: , Rfl:   •  HYDROcodone-acetaminophen (NORCO)  MG per tablet, Take 1 tablet by mouth Every 6 (Six) Hours As Needed for Moderate Pain ., Disp: , Rfl:   •  Krill Oil 1000 MG capsule, Take  by mouth., Disp: , Rfl:   •  lisinopril (PRINIVIL,ZESTRIL) 5 MG tablet, Take 5 mg by mouth Daily., Disp: , Rfl:   •  magnesium oxide (MAG-OX) 400 MG tablet, Take 2 tablets by mouth 2 (Two) Times a Day., Disp: 360 tablet, Rfl: 1  •  metoprolol succinate XL (TOPROL-XL) 25 MG 24 hr tablet, Take 1 tablet by mouth Daily., Disp: , Rfl:   •  pantoprazole (PROTONIX) 40 MG EC tablet, Take 40 mg by mouth Daily., Disp: , Rfl:   •  tamsulosin (FLOMAX) 0.4 MG capsule 24 hr capsule, Take 1 capsule by mouth Every Night., Disp: , Rfl:   •  acetaminophen (TYLENOL) 500 MG tablet, Take 1,000 mg by mouth., Disp: , Rfl:   •  methocarbamol (ROBAXIN) 500 MG tablet, , Disp: , Rfl:   •  metoprolol succinate XL (TOPROL-XL) 25 MG 24 hr tablet, TAKE ONE AND ONE-HALF TABLETS DAILY (Patient not taking: Reported on 10/21/2022), Disp: 135 tablet,  "Rfl: 2  •  Testosterone 20.25 MG/1.25GM (1.62%) gel, Place 20.25 mg on the skin as directed by provider., Disp: , Rfl:       Objective:     Vitals:    10/21/22 1139   BP: 132/100   Pulse: 57   Weight: 101 kg (223 lb)   Height: 177.8 cm (70\")     Body mass index is 32 kg/m².    Constitutional:       Appearance: Well-developed.   Eyes:      General: No scleral icterus.  HENT:      Head: Normocephalic.   Neck:      Thyroid: No thyromegaly.      Vascular: No JVD.      Lymphadenopathy: No cervical adenopathy.   Pulmonary:      Effort: Pulmonary effort is normal.      Breath sounds: Normal breath sounds. No wheezing. No rales.   Cardiovascular:      Normal rate. Irregularly irregular rhythm.      No gallop.   Edema:     Peripheral edema absent.   Abdominal:      Palpations: Abdomen is soft.      Tenderness: There is no abdominal tenderness.   Musculoskeletal: Normal range of motion. Skin:     General: Skin is warm and dry.      Findings: No rash.   Neurological:      Mental Status: Alert and oriented to person, place, and time.           ECG 12 Lead    Date/Time: 10/21/2022 12:05 PM  Performed by: Rod Gomes MD  Authorized by: Rod Gomes MD   Comparison: compared with previous ECG   Similar to previous ECG  Rhythm: atrial fibrillation    Clinical impression: abnormal EKG             Assessment:       Diagnosis Plan   1. Acute MI, inferoposterior wall (HCC)        2. Ventricular fibrillation (HCC)        3. Chronic a-fib (HCC)        4. Hyperlipidemia, unspecified hyperlipidemia type               Plan:       I think that he is doing well he is asymptomatic from a cardiac standpoint probably still will continue to do well his risk modification is pretty good I am going to have him come back and see us in a year sooner if he is having trouble we are not anticoagulating him because he is got this little lower right rectal bleeding    Return for See Justine Wallace in 1 year, See me in 2 years.     As always, it has " been a pleasure to participate in your patient's care.      Sincerely,       Rod Gomes MD

## 2022-10-31 ENCOUNTER — OFFICE VISIT (OUTPATIENT)
Dept: GASTROENTEROLOGY | Facility: CLINIC | Age: 67
End: 2022-10-31

## 2022-10-31 VITALS
SYSTOLIC BLOOD PRESSURE: 129 MMHG | HEIGHT: 70 IN | HEART RATE: 80 BPM | WEIGHT: 223.5 LBS | TEMPERATURE: 96.5 F | BODY MASS INDEX: 32 KG/M2 | DIASTOLIC BLOOD PRESSURE: 75 MMHG

## 2022-10-31 DIAGNOSIS — D50.9 IRON DEFICIENCY ANEMIA, UNSPECIFIED IRON DEFICIENCY ANEMIA TYPE: Primary | ICD-10-CM

## 2022-10-31 PROCEDURE — 99204 OFFICE O/P NEW MOD 45 MIN: CPT | Performed by: INTERNAL MEDICINE
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
## 2022-11-14 ENCOUNTER — TELEPHONE (OUTPATIENT)
Dept: GASTROENTEROLOGY | Facility: CLINIC | Age: 67
End: 2022-11-14

## 2022-11-14 NOTE — TELEPHONE ENCOUNTER
Caller: Frank Patel    Relationship to patient: Self    Best call back number:[phone]    Type of visit: COLONOSCOPY AND EGD     Requested date: NEXT AVAILABLE     Additional notes:PATIENT CALLED IN ASKING TO SCHEDULE COLONOSCOPY AND EGD

## 2022-11-16 ENCOUNTER — PRE-PROCEDURE SCREENING (OUTPATIENT)
Dept: GASTROENTEROLOGY | Facility: CLINIC | Age: 67
End: 2022-11-16

## 2022-11-22 NOTE — TELEPHONE ENCOUNTER
Patient called back states he will be having Anesthesia  The surgery was to be next Tue  States he cant come in. Has a prepaid  Event that his wife his going to and he cant drive.    [phone]   No

## 2023-02-06 ENCOUNTER — PRE-PROCEDURE SCREENING (OUTPATIENT)
Dept: GASTROENTEROLOGY | Facility: CLINIC | Age: 68
End: 2023-02-06
Payer: COMMERCIAL

## 2023-02-06 NOTE — TELEPHONE ENCOUNTER
LAST SCOPE 2010 ( NOT SURE WHERE) --No personal history of polyps--No family history of polyps or colon cancer--CLOPIDOGREL--Medications:              acetaminophen (TYLENOL) 500 MG tablet  atorvastatin (LIPITOR) 40 MG tablet  baclofen (LIORESAL) 10 MG tablet  clopidogrel (PLAVIX) 75 MG tablet  ferrous sulfate 325 (65 FE) MG tablet  fluticasone (FLONASE) 50 MCG/ACT nasal spray    gabapentin (NEURONTIN) 300 MG capsule  hydroCHLOROthiazide (HYDRODIURIL) 25 MG tablet  HYDROcodone-acetaminophen (NORCO)  MG per tablet  Krill Oil 1000 MG capsule  lisinopril (PRINIVIL,ZESTRIL) 5 MG tablet  magnesium oxide (MAG-OX) 400 MG tablet  methocarbamol (ROBAXIN) 500 MG tablet    metoprolol succinate XL (TOPROL-XL) 25 MG 24 hr tablet    metoprolol succinate XL (TOPROL-XL) 25 MG 24 hr tablet  pantoprazole (PROTONIX) 40 MG EC tablet  tamsulosin (FLOMAX) 0.4 MG capsule 24 hr capsule    Testosterone 20.25 MG/1.25GM (1.62%) gel      QUESTIONNAIRE SCEEENING  HAS BEEN SENT TO DOCTOR FOR REVIEW

## 2023-02-07 ENCOUNTER — PREP FOR SURGERY (OUTPATIENT)
Dept: OTHER | Facility: HOSPITAL | Age: 68
End: 2023-02-07
Payer: COMMERCIAL

## 2023-02-07 DIAGNOSIS — D50.9 IRON DEFICIENCY ANEMIA, UNSPECIFIED IRON DEFICIENCY ANEMIA TYPE: Primary | ICD-10-CM

## 2023-02-07 RX ORDER — SODIUM CHLORIDE, SODIUM LACTATE, POTASSIUM CHLORIDE, CALCIUM CHLORIDE 600; 310; 30; 20 MG/100ML; MG/100ML; MG/100ML; MG/100ML
30 INJECTION, SOLUTION INTRAVENOUS CONTINUOUS
Status: CANCELLED | OUTPATIENT
Start: 2023-04-18

## 2023-02-07 NOTE — TELEPHONE ENCOUNTER
Per Dr. Mcgrath:    Case request submitted for EGd/colonoscopy.  I saw him in office in October last year recommended EGD/colon but don't see they were scheduled.  Please schedule in next 2-4 weeks, will need to hold Plavix x5 days prior.        Telephone encounter sent to BEATRICE Jaffe for permission to hold Plavix prior to his procedures.

## 2023-02-07 NOTE — TELEPHONE ENCOUNTER
Patient called. No answer. Left detail message per RAISSA on his VM.  Advised he will need to hold his Plavix 5 days prior to his scope. Advised to call back for questions and to call back to schedule.

## 2023-02-08 ENCOUNTER — TELEPHONE (OUTPATIENT)
Dept: GASTROENTEROLOGY | Facility: CLINIC | Age: 68
End: 2023-02-08
Payer: COMMERCIAL

## 2023-02-08 NOTE — TELEPHONE ENCOUNTER
LATESHA Moreland for colonoscopy on 4/18/23  arrive at 11am   . Mailed Prep instructions to Mailing address on-file. [email or web address]

## 2023-03-17 RX ORDER — CLOPIDOGREL BISULFATE 75 MG/1
75 TABLET ORAL DAILY
Qty: 90 TABLET | Refills: 3 | Status: SHIPPED | OUTPATIENT
Start: 2023-03-17

## 2023-03-23 RX ORDER — ATORVASTATIN CALCIUM 40 MG/1
TABLET, FILM COATED ORAL
Qty: 90 TABLET | Refills: 3 | Status: SHIPPED | OUTPATIENT
Start: 2023-03-23

## 2023-04-10 ENCOUNTER — OFFICE VISIT (OUTPATIENT)
Dept: ONCOLOGY | Facility: CLINIC | Age: 68
End: 2023-04-10
Payer: COMMERCIAL

## 2023-04-10 ENCOUNTER — LAB (OUTPATIENT)
Dept: LAB | Facility: HOSPITAL | Age: 68
End: 2023-04-10
Payer: COMMERCIAL

## 2023-04-10 VITALS
TEMPERATURE: 98.2 F | WEIGHT: 222.2 LBS | BODY MASS INDEX: 31.81 KG/M2 | HEIGHT: 70 IN | RESPIRATION RATE: 16 BRPM | SYSTOLIC BLOOD PRESSURE: 145 MMHG | DIASTOLIC BLOOD PRESSURE: 87 MMHG | OXYGEN SATURATION: 98 % | HEART RATE: 63 BPM

## 2023-04-10 DIAGNOSIS — E83.42 HYPOMAGNESEMIA: ICD-10-CM

## 2023-04-10 DIAGNOSIS — Z90.81 S/P SPLENECTOMY: ICD-10-CM

## 2023-04-10 DIAGNOSIS — D72.829 LEUKOCYTOSIS, UNSPECIFIED TYPE: ICD-10-CM

## 2023-04-10 DIAGNOSIS — D64.9 ANEMIA, UNSPECIFIED TYPE: ICD-10-CM

## 2023-04-10 DIAGNOSIS — D50.9 IRON DEFICIENCY ANEMIA, UNSPECIFIED IRON DEFICIENCY ANEMIA TYPE: ICD-10-CM

## 2023-04-10 DIAGNOSIS — D50.9 IRON DEFICIENCY ANEMIA, UNSPECIFIED IRON DEFICIENCY ANEMIA TYPE: Primary | ICD-10-CM

## 2023-04-10 LAB
BASOPHILS # BLD AUTO: 0.15 10*3/MM3 (ref 0–0.2)
BASOPHILS NFR BLD AUTO: 0.9 % (ref 0–1.5)
DEPRECATED RDW RBC AUTO: 47.6 FL (ref 37–54)
EOSINOPHIL # BLD AUTO: 0.84 10*3/MM3 (ref 0–0.4)
EOSINOPHIL NFR BLD AUTO: 5.3 % (ref 0.3–6.2)
ERYTHROCYTE [DISTWIDTH] IN BLOOD BY AUTOMATED COUNT: 13.4 % (ref 12.3–15.4)
FERRITIN SERPL-MCNC: 101.5 NG/ML (ref 30–400)
HCT VFR BLD AUTO: 44.7 % (ref 37.5–51)
HGB BLD-MCNC: 14.1 G/DL (ref 13–17.7)
IMM GRANULOCYTES # BLD AUTO: 0.07 10*3/MM3 (ref 0–0.05)
IMM GRANULOCYTES NFR BLD AUTO: 0.4 % (ref 0–0.5)
IRON 24H UR-MRATE: 86 MCG/DL (ref 59–158)
IRON SATN MFR SERPL: 21 % (ref 14–48)
LYMPHOCYTES # BLD AUTO: 4.78 10*3/MM3 (ref 0.7–3.1)
LYMPHOCYTES NFR BLD AUTO: 29.9 % (ref 19.6–45.3)
MAGNESIUM SERPL-MCNC: 1.8 MG/DL (ref 1.8–2.5)
MCH RBC QN AUTO: 29.9 PG (ref 26.6–33)
MCHC RBC AUTO-ENTMCNC: 31.5 G/DL (ref 31.5–35.7)
MCV RBC AUTO: 94.9 FL (ref 79–97)
MONOCYTES # BLD AUTO: 1.17 10*3/MM3 (ref 0.1–0.9)
MONOCYTES NFR BLD AUTO: 7.3 % (ref 5–12)
NEUTROPHILS NFR BLD AUTO: 56.2 % (ref 42.7–76)
NEUTROPHILS NFR BLD AUTO: 8.97 10*3/MM3 (ref 1.7–7)
NRBC BLD AUTO-RTO: 0 /100 WBC (ref 0–0.2)
PLATELET # BLD AUTO: 412 10*3/MM3 (ref 140–450)
PMV BLD AUTO: 10.2 FL (ref 6–12)
RBC # BLD AUTO: 4.71 10*6/MM3 (ref 4.14–5.8)
TIBC SERPL-MCNC: 406 MCG/DL (ref 249–505)
TRANSFERRIN SERPL-MCNC: 290 MG/DL (ref 200–360)
WBC NRBC COR # BLD: 15.98 10*3/MM3 (ref 3.4–10.8)

## 2023-04-10 PROCEDURE — 82728 ASSAY OF FERRITIN: CPT

## 2023-04-10 PROCEDURE — 84466 ASSAY OF TRANSFERRIN: CPT

## 2023-04-10 PROCEDURE — 36415 COLL VENOUS BLD VENIPUNCTURE: CPT

## 2023-04-10 PROCEDURE — 83735 ASSAY OF MAGNESIUM: CPT

## 2023-04-10 PROCEDURE — 85025 COMPLETE CBC W/AUTO DIFF WBC: CPT

## 2023-04-10 PROCEDURE — 83540 ASSAY OF IRON: CPT

## 2023-04-10 NOTE — PROGRESS NOTES
.     REASON FOR FOLLOWUP:     * Chronic leukocytosis.   *Iron deficiency anemia, started on oral iron 8/1/2022.  *Hypomagnesemia.  Started on oral magnesium 8/1/2022.      HISTORY OF PRESENT ILLNESS:  The patient is a 67 y.o. year old male with hypertension, hyperlipidemia, diabetes, coronary artery disease, atrial fibrillation, who presents on 04/10/2023 for a 6-month follow-up evaluation.     Patient reports that he is taking oral iron once or twice a day, most of the time it is once a day and also the magnesium 400 mg twice a day. He denies any fevers, sweating, or chills. He does have some nasal drainage, especially in the morning time, but is not having any pain in the sinus area.    Patient reports no melena or hematochezia.  No active bleeding.  No dizziness or lightheadedness.    Laboratory study on 04/10/2023 reported further improved hemoglobin 14.1, MCV 94.9. Patient has normal platelets 412,000. He also has worsening leukocytosis, WBC 15,980 including neutrophils 8,970, lymphocytes 4,780, monocytes 1,170, eosinophil 840, and immature granulocytes 70. Iron study is pending, so his magnesium level.          Past Medical History:   Diagnosis Date   • Acute MI, inferoposterior wall    • Atrial fibrillation    • BPH (benign prostatic hyperplasia)    • DDD (degenerative disc disease), lumbar    • Diabetes    • Elevated cholesterol    • GERD (gastroesophageal reflux disease)    • Head trauma 2007   • Hemorrhoids    • History of shingles 2012   • History of snoring    • HTN (hypertension)    • Hyperlipidemia    • Hypogonadism in male    • Kidney stone    • Muscle spasm    • Ventricular fibrillation      Past Surgical History:   Procedure Laterality Date   • APPENDECTOMY     • CARDIAC CATHETERIZATION N/A 08/18/2019    Procedure: Left Heart Cath;  Surgeon: Rod Gomes MD;  Location: [facility] CATH INVASIVE LOCATION;  Service: Cardiology   • CARDIAC CATHETERIZATION N/A 08/18/2019    Procedure: Stent ROBY  coronary;  Surgeon: Rod Gomes MD;  Location:  [facility];  Service: Cardiology   • CARDIAC CATHETERIZATION N/A 08/18/2019    Procedure: Coronary angiography;  Surgeon: Rod Gomes MD;  Location:  [facility];  Service: Cardiology   • CARDIAC CATHETERIZATION N/A 08/18/2019    Procedure: Left ventriculography;  Surgeon: Rod Gomes MD;  Location:  [facility];  Service: Cardiology   • COLONOSCOPY  2010   • FEMUR FRACTURE SURGERY Right 1990   • HAND SURGERY Right    • ORIF FINGER / THUMB FRACTURE Right    • SPLENECTOMY  2007   • TONSILLECTOMY     · Tonsillectomy 1962 in [place].  · Appendectomy in 1977 in [place].  · Right leg fracture and ORIF in 1989 in [place].  · Splenectomy in June 2007 in [facility]  · Cardiac catheterization with 2 stents placement August 2019 at [facility] Dr. Rod Gomes.  · Risk for surgery in September 2021 at [facility] Dr. Moncho Velarde.  · Colonoscopy 2010.      HEMATOLOGY HISTORY: The patient is a 67 y.o. year old male with hypertension, hyperlipidemia, diabetes, coronary artery disease, atrial fibrillation, who presented on 8/1/2022 for initial evaluation because of chronic leukocytosis.     Patient reports he has problem with leukocytes for many years and previously was seen by Dr. Ugo Nugent at [facility] and also had bone marrow aspiration and biopsy. The patient was told having some abnormalities with his blood, however, no need for treatment. He was followed by Dr. Nugent for a few times and then stopped going. I searched medical records in EMR system in [facility] and his last visit with Dr. Ugo Nugent was on 09/17/2017. According to the notes the patient had bone marrow aspiration and biopsy.     Patient originally was seen by Dr. Nugent on 02/15/2017 for leukocytosis. His peripheral blood flow cytometry study reported monoclonal T cell  population, clonal T-LGL. Subsequently the patient had bone marrow aspiration and biopsy on 03/18/2017 with no obvious increase of blasts and also no increase of mast cell infiltrates. No evidence for metastatic tumor, granulomata or malignant lymphoma. No evidence for T-LGL leukemia. There was certainly maturation abnormalities noted in the megakaryocytic and erythroid lineage with less than 5% blasts. No excessive monoclonal T cell population. Chromosome study was normal male karyotype. FISH panel was negative for rearrangement of PDGFRB, FGFR1 and negative for deletion of 4q12. Dr. Nugent recommended annual followup with flow cytometry study.    I reviewed laboratory results in the [facility] EMR system. CBC study from 02/13/2012 reported WBC 11,740 including neutrophils 5440, lymphocytes 4260, monocytes 860, eosinophils 910 and basophils 230. Had hemoglobin 16.0, hematocrit 48.1, and platelets 477,000. On 10/19/2012, patient had WBC 11,300 including ANC 6800, lymphocytes 4200, monocytes 400, normal platelets 375,000 and hemoglobin 16.2. Lab study on 04/16/2013 reported hemoglobin 16.0, platelets 367,000, WBC 10,900 including granulocytes 5600, lymphocytes 4500, monocytes 800.     On 02/15/2017 when he was seen by Dr. Ugo Nugent, laboratory study reported total WBC 13,730 including ANC 5360, lymphocytes 5230, monocytes 1380, eosinophils 1670 and basophils 90. Hemoglobin was 15.5, MCV was 91.5, and platelets 481,000. Normal C-reactive protein 0.8 mg/dL. Protein electrophoresis reported normal SPEP without M spike. Protein immunofixation was also negative. Had serum IgG 1116, IgA 370, decreased IgM 24 mg/dL. Peripheral blood flow cytometry study reported clonal T cell population in CD3 and CD8, positive T cells; also positive for CD2, CD11C, and CD5 (dim), CD7, CD56 (dim), CD45, CD57 (bright), CD26 (partial) and TCR alpha/beta.    This patient subsequently had bone marrow aspiration and biopsy on  03/13/2017 at [facility]. Chromosomal analysis reported normal male karyotype. FISH study was negative for deletion of 4q12 including CHIC2, and negative for fusion of FIP1L1 and PDGFRA genes. Also negative for rearrangement of PDGFRB, FGFR1. The bone marrow biopsy reported variably cellular bone marrow 30-40% with trilineage hematopoiesis. Decreased iron stores. The flow cytometry study of the bone marrow aspiration, however, did not show the clonal T-LGL population. No evidence for T-LGL leukemia or malignant lymphoma.     Lab study on 03/12/2018 reported WBC 12,400 with granulocytes 6500, lymphocytes 5300, monocytes 600 without eosinophils population. Hemoglobin was 16.7, MCV 90.6, platelets 475,000.     CBC on 03/11/2019 reported WBC 12,470, granulocytes 4740, lymphocytes 2540, monocytes 1770 and eosinophils 1870, basophil 110. Atypical lymphocytes was 1430. Hemoglobin was 15.9, MCV 91.0, platelets 460,000.     CBC study on 07/01/2020 reported WBC 15,420, ANC 8110, lymphocytes 5130, monocytes 820, eosinophils 1220, basophils 140. Hemoglobin was 15.2 and platelets 415,000.     Laboratory study on 02/22/2021 reported WBC 15,740, ANC 6570, lymphocytes 2160, monocytes 360, eosinophils 2870, basophils 230, atypical lymphocytes 1550, platelets 451,000 and hemoglobin 13.7 with MCV 95.6, MCHC 31.7. His B12 level was 385 pg/mL. Chemistry lab reported glucose 121, otherwise unremarkable CMP including normal liver function panel.    Laboratory study on 06/21/2021 reported worsening anemia, hemoglobin 13.1, MCV 94.7, platelets 447,000, WBC 14,530 including ANC 6310, lymphocytes 4110, monocytes 770, eosinophils 1800, basophils 260, atypical lymphocytes 1280 and platelets 447,000. Vitamin B12 level was 435 pg/mL. No iron studies, no folate.     Laboratory study on 04/18/2022 reported hemoglobin 13.4, MCV 93.9, platelets 456,000, WBC 15,940 including ANC 7140 and lymphocytes 6220, monocytes 1090,  eosinophils 1240, basophils 190, platelets 456,000. Hemoglobin 13.4, MCV 93.9, MCHC 31.1. CMP on the same day reported creatinine 1.12, glucose 111, calcium 10.3, total protein 7.5, albumin 4.8, globulin 2.6, otherwise unremarkable.     Laboratory study today in our clinic on 08/01/2022 reported further worsening anemia with hemoglobin 11.5, MCV 93.1, normal platelets 399,000, WBC 13,730 including neutrophils 4790, lymphocytes 5320, monocytes 1370, eosinophils 1910 and basophils 240, immature granulocytes 100.     I reviewed his peripheral blood smear on 08/01/2022. There are significant anisocytosis of RBC, jose-shaped RBC, spike cells. Occasional schistocytes. No target cells. No clustering of platelets. Morphology of WBC shows increased eosinophils and lymphocytes, however, no blasts. No increased metamyelocytes and promyelocytes.     I saw him originally on 8/1/2022 for initial evaluation because of chronic leukocytosis.  Laboratory studies reported severe iron deficiency with a ferritin 9.5 ng/mL, iron saturation 8% with free iron 43 TIBC 521.  Patient had a normal folate and mediocre B12 level.  We will start the patient on ferrous sulfate 325 mg 3 times a day.    She also was found to having hypomagnesemia 1.6 and we started patient on oral magnesium 400 mg twice a day.    His stool samples submitted on 8/23/2022 was tested negative for occult blood.      Laboratory study today on 10/10/2022 reported much improved ferritin 55.6 ng/mL, iron saturation 17% with free iron 68 and iron saturation 393 together with improved hemoglobin 13.3.  However he has persistent mild leukocytosis WBC 11,640, including ANC 5300 lymphocytes 4150, monocytes 1100 and eosinophil 950.        MEDICATIONS    Current Outpatient Medications:   •  acetaminophen (TYLENOL) 500 MG tablet, Take 2 tablets by mouth., Disp: , Rfl:   •  atorvastatin (LIPITOR) 40 MG tablet, TAKE 1 TABLET BY MOUTH AT  NIGHT, Disp: 90 tablet, Rfl: 3  •  baclofen  (LIORESAL) 10 MG tablet, Take 1 tablet by mouth 3 (Three) Times a Day., Disp: , Rfl:   •  clopidogrel (PLAVIX) 75 MG tablet, TAKE 1 TABLET BY MOUTH  DAILY, Disp: 90 tablet, Rfl: 3  •  ferrous sulfate 325 (65 FE) MG tablet, Take 1 tablet by mouth 2 (Two) Times a Day With Meals., Disp: 180 tablet, Rfl: 1  •  fluticasone (FLONASE) 50 MCG/ACT nasal spray, 2 sprays into the nostril(s) as directed by provider Daily As Needed for Rhinitis., Disp: , Rfl:   •  gabapentin (NEURONTIN) 300 MG capsule, , Disp: , Rfl:   •  hydroCHLOROthiazide (HYDRODIURIL) 25 MG tablet, Take 1 tablet by mouth Daily., Disp: , Rfl:   •  HYDROcodone-acetaminophen (NORCO)  MG per tablet, Take 1 tablet by mouth Every 6 (Six) Hours As Needed for Moderate Pain., Disp: , Rfl:   •  Krill Oil 1000 MG capsule, Take  by mouth., Disp: , Rfl:   •  lisinopril (PRINIVIL,ZESTRIL) 5 MG tablet, Take 1 tablet by mouth Daily., Disp: , Rfl:   •  magnesium oxide (MAG-OX) 400 MG tablet, Take 2 tablets by mouth 2 (Two) Times a Day., Disp: 360 tablet, Rfl: 1  •  metoprolol succinate XL (TOPROL-XL) 25 MG 24 hr tablet, TAKE ONE AND ONE-HALF TABLETS DAILY, Disp: 135 tablet, Rfl: 2  •  pantoprazole (PROTONIX) 40 MG EC tablet, Take 1 tablet by mouth Daily., Disp: , Rfl:   •  tamsulosin (FLOMAX) 0.4 MG capsule 24 hr capsule, Take 1 capsule by mouth Every Night., Disp: , Rfl:   •  Testosterone 20.25 MG/1.25GM (1.62%) gel, Place 20.25 mg on the skin as directed by provider. (Patient not taking: Reported on 4/10/2023), Disp: , Rfl:     ALLERGIES:     Allergies   Allergen Reactions   • Topiramate Other (See Comments)     irritability   Mild allergy to milk and dairy product.    SOCIAL HISTORY:       Social History     Socioeconomic History   • Marital status:      Spouse name: Elvira   Tobacco Use   • Smoking status: Former     Packs/day: 1.50     Types: Cigarettes     Quit date: 1999     Years since quittin.6   • Smokeless tobacco: Never   • Tobacco comments:  "    CAFFEINE USE: 1-2 16OZ COKES DAILY   Substance and Sexual Activity   • Alcohol use: Yes     Alcohol/week: 3.0 standard drinks     Types: 3 Cans of beer per week     Comment: OCCASSIONALLY   • Drug use: Never   • Sexual activity: Defer   · Quit smoking 1993, smoked 1-1/2 pack a day.  No vaping.      FAMILY HISTORY:  Family History   Problem Relation Age of Onset   • Hyperlipidemia Mother    • Hypertension Mother    • Heart disease Mother    • Diabetes Mother    • Heart failure Mother    • Heart attack Mother    · Mother had a hypertension, hyperlipidemia, heart attack, diabetes and obesity.  · Aunt has diabetes.     REVIEW OF SYSTEMS:  See HPI.           Vitals:    04/10/23 1427   BP: 145/87   Pulse: 63   Resp: 16   Temp: 98.2 °F (36.8 °C)   TempSrc: Temporal   SpO2: 98%   Weight: 101 kg (222 lb 3.2 oz)   Height: 177.8 cm (70\")   PainSc:   4   PainLoc: Back         4/10/2023     2:30 PM   Current Status   ECOG score 0      PHYSICAL EXAM:    GENERAL:  Well-developed, well-nourished male in no acute distress.  Orientated to time place and the people.  SKIN:  Warm, dry without rashes, purpura or petechiae.  HEENT:  Normocephalic.  Wearing mask.   LYMPHATICS:  No cervical, supraclavicular adenopathy.  CHEST: Normal respiratory effort.  Lungs clear to auscultation. Good airflow.  CARDIAC:  Regular rate and rhythm without murmurs. Normal S1,S2.  ABDOMEN:  Soft, nontender with no organomegaly or masses.  Bowel sounds normal.  EXTREMITIES:  No clubbing, cyanosis or edema.  NEUROLOGICAL:  Grossly intact.  No focal neurological deficits.  PSYCHIATRIC:  Normal affect and mood.      RECENT LABS:        WBC   Date Value Ref Range Status   04/10/2023 15.98 (H) 3.40 - 10.80 10*3/mm3 Final   10/10/2022 11.64 (H) 3.40 - 10.80 10*3/mm3 Final   08/01/2022 13.73 (H) 3.40 - 10.80 10*3/mm3 Final   04/18/2022 15.94 (H) 4.5 - 11.0 10*3/uL Final   10/29/2020 15.38 (H) 4.5 - 11.0 10*3/uL Final   07/01/2020 15.42 (H) 4.5 - 11.0 10*3/uL " Final   09/06/2019 11.95 (H) 4.5 - 11.0 10*3/uL Final   08/23/2019 15.18 (H) 3.40 - 10.80 10*3/mm3 Final   08/23/2019 15.18 (H) 3.40 - 10.80 10*3/mm3 Final   08/22/2019 15.64 (H) 3.40 - 10.80 10*3/mm3 Final   08/21/2019 17.89 (H) 3.40 - 10.80 10*3/mm3 Final   08/20/2019 21.31 (H) 3.40 - 10.80 10*3/mm3 Final   08/19/2019 17.11 (H) 3.40 - 10.80 10*3/mm3 Final   08/18/2019 16.30 (H) 3.40 - 10.80 10*3/mm3 Final   03/11/2019 12.47 (H) 4.5 - 11.0 10*3/uL Final   09/21/2018 15.1 (H) 4.5 - 11.0 10*3/uL Final   03/12/2018 12.4 (H) 4.5 - 11.0 10*3/uL Final     Hemoglobin   Date Value Ref Range Status   04/10/2023 14.1 13.0 - 17.7 g/dL Final   10/10/2022 13.3 13.0 - 17.7 g/dL Final   08/01/2022 11.5 (L) 13.0 - 17.7 g/dL Final   04/18/2022 13.4 (L) 13.5 - 17.5 g/dL Final   10/29/2020 15.4 13.5 - 17.5 g/dL Final   07/01/2020 15.2 13.5 - 17.5 g/dL Final   09/06/2019 13.7 13.5 - 17.5 g/dL Final   08/23/2019 12.7 (L) 13.0 - 17.7 g/dL Final   08/22/2019 12.1 (L) 13.0 - 17.7 g/dL Final   08/21/2019 13.5 13.0 - 17.7 g/dL Final   08/20/2019 14.4 13.0 - 17.7 g/dL Final   08/19/2019 14.8 13.0 - 17.7 g/dL Final   08/18/2019 15.9 13.0 - 17.7 g/dL Final   03/11/2019 15.9 13.5 - 17.5 g/dL Final   09/21/2018 16.1 13.5 - 17.5 g/dL Final   03/12/2018 16.7 13.5 - 17.5 g/dL Final     Platelets   Date Value Ref Range Status   04/10/2023 412 140 - 450 10*3/mm3 Final   10/10/2022 361 140 - 450 10*3/mm3 Final   08/01/2022 399 140 - 450 10*3/mm3 Final   04/18/2022 456 (H) 140 - 440 10*3/uL Final   10/29/2020 435 140 - 440 10*3/uL Final   07/01/2020 415 140 - 440 10*3/uL Final   09/06/2019 721 (H) 140 - 440 10*3/uL Final   08/23/2019 404 140 - 450 10*3/mm3 Final   08/22/2019 349 140 - 450 10*3/mm3 Final   08/21/2019 284 140 - 450 10*3/mm3 Final   08/20/2019 346 140 - 450 10*3/mm3 Final   08/19/2019 403 140 - 450 10*3/mm3 Final   08/18/2019 450 140 - 450 10*3/mm3 Final   03/11/2019 460 (H) 140 - 440 10*3/uL Final   09/21/2018 461 (H) 150 - 450 10*3/uL  Final   03/12/2018 475 (H) 883 - 779 10*3/uL Final       Assessment & Plan     ASSESSMENT:    1. Leukocytosis with eosinophilia, monocytosis, and lymphocytosis.   • This patient has problem since at least 2013 when he was seen by Dr. Ugo Nugent at [facility] in 2017 with both peripheral blood flow cytometry study in 02/2017 and subsequently bone marrow aspiration and biopsy in 03/2013 which reported no evidence for leukemia or lymphoma in the bone marrow despite previous peripheral blood showed monoclonal T cell LGL.   • Bone marrow examination in March 2017, FISH study was negative for deletion of 4q12 including CHIC2, and negative for fusion of FIP1L1 and PDGFRA genes. Also negative for rearrangement of PDGFRB, FGFR1.  • Reviewed his laboratory studies, his WBC, eosinophilia, monocytosis, lymphocytosis are relatively stable with fluctuation in numbers.   • Patient reports on 8/1/2022 he is feeling relatively well. No fever, sweating or chills. He has no significant weight loss. I think we can monitor for now. I will focus on the anemia workup first. If there is no explainable reason for his anemia, we likely will need to repeat bone marrow aspiration and biopsy.   • I do think this patient likely has myeloproliferative disorder.  • Laboratory study today on 04/10/2023 reported WBC 15,980 including ANC 1870, lymphocytes 4780, monocytes 1170 eosinophils 840.  Patient reports stable clinical condition.  Continue to monitor.      2. Iron deficiency anemia.   • This is relatively new for the past 2 years. His hemoglobin has been gradually trending down.   • He reports no melena or hematochezia. He does complain of fatigue.   • I reviewed his peripheral blood smear on 8/1/2022.  I suspect that he may have iron deficiency with jose-shaped RBC. I recommended to obtain laboratory studies first with serum ferritin, iron profile, vitamin B12, and folate. We will also check CMP and LDH.   • 8/1/2022 lab studies  reported iron deficiency with ferritin of 105 iron saturation 8% free iron 43 TIBC 400, B12 level 114 and folate level 8.7.  Patient was started on oral ferrous sulfate 325 mg.    • On 10/10/2022 patient reports excellent tolerance to oral iron treatment.  Iron study also showed much improved ferritin 55.6 and low normal iron saturation 17%.  He also has much improved hemoglobin 13.3.  • On 8/1/2022 laboratory study reported iron deficiency with free iron 43, iron saturation 8%, TIBC 521 and ferritin 9.5 ng/mL. He had normal folate 8.7 ng/mL and low normal B12 level at 414 pg/mL. Also reported low magnesium at 1.6, creatinine 1.39, potassium 5.3, normal sodium, and calcium 9.2, and normal LDH at 187. This patient clearly has iron deficiency.  No evidence for hemolysis.   • Laboratory study today on 04/10/2023 reported further improved hemoglobin 14.1. His iron study also came back with ferritin 101.5, iron saturation 21%, free iron 86, and TIBC of 406. Patient reports he mostly takes oral iron only once a day, occasionally twice a day. I think with further improved hemoglobin and iron numbers, once a day would be good enough.    3.  Hypomagnesemia.  Magnesium 1.6 today on 8/1/2022.  Etiology is not clear. Nevertheless I recommended to start oral magnesium oxide 400 mg twice a day.    · On 10/10/2022 persistent low magnesium 1.6.  Discussed with patient, will increase oral magnesium approximately 200 mg twice daily.  At the #3 hypomagnesemia added this 14 2023 magnesium is 1.8, marginally normalized.  Patient reports he been taking magnesium 400 mg twice a day.  And I will continue that.  · On 04/10/2023, magnesium is 1.8, marginally normalized. Patient reports he has been taking magnesium 400 mg twice a day and he will continue that.    4. Chronic renal insufficiency, stage 3. Patient reports he is waiting to see his nephrologist, Dr. Fidel Good. He prefers we obtaining laboratory studies also to avoid repeat  "blood draw next week. We will check magnesium, phosphorus in conjunction with CMP today.   · Patient has much improved hemoglobin on 10/10/2022.  We will decrease oral iron supplement.    PLAN:  1. Continue oral iron once a day.  2. Continue oral magnesium 400 mg twice a day.  3. I will see patient in 6 months for reevaluation again, we will check CBC, iron study, vitamin B12 level and magnesium level.    I discussed with the patient about laboratory results and further management plan.  Patient voiced understanding and agreeable.      RONY GHOSH M.D., Ph.D.           CC:   MD Fidel Galindo MD        Transcribed from ambient dictation for Rony Ghosh MD PhD by Erik Hernandez.  04/10/23   16:54 EDT    [facility] Provider Statement:[id number]::\"Patient or patient representative verbalized consent to the visit recording.\",\"I have personally performed the services described in this document as transcribed by the above individual, and it is both accurate and complete.\"      "

## 2023-04-18 ENCOUNTER — ANESTHESIA (OUTPATIENT)
Dept: GASTROENTEROLOGY | Facility: HOSPITAL | Age: 68
End: 2023-04-18
Payer: COMMERCIAL

## 2023-04-18 ENCOUNTER — ANESTHESIA EVENT (OUTPATIENT)
Dept: GASTROENTEROLOGY | Facility: HOSPITAL | Age: 68
End: 2023-04-18
Payer: COMMERCIAL

## 2023-04-18 ENCOUNTER — HOSPITAL ENCOUNTER (OUTPATIENT)
Facility: HOSPITAL | Age: 68
Setting detail: HOSPITAL OUTPATIENT SURGERY
Discharge: HOME | End: 2023-04-18
Attending: INTERNAL MEDICINE | Admitting: INTERNAL MEDICINE
Payer: COMMERCIAL

## 2023-04-18 VITALS
BODY MASS INDEX: 31.21 KG/M2 | OXYGEN SATURATION: 98 % | HEIGHT: 70 IN | WEIGHT: 218 LBS | DIASTOLIC BLOOD PRESSURE: 73 MMHG | RESPIRATION RATE: 16 BRPM | HEART RATE: 71 BPM | SYSTOLIC BLOOD PRESSURE: 115 MMHG

## 2023-04-18 DIAGNOSIS — D50.9 IRON DEFICIENCY ANEMIA, UNSPECIFIED IRON DEFICIENCY ANEMIA TYPE: ICD-10-CM

## 2023-04-18 LAB — GLUCOSE BLDC GLUCOMTR-MCNC: 94 MG/DL (ref 70–130)

## 2023-04-18 PROCEDURE — 88305 TISSUE EXAM BY PATHOLOGIST: CPT | Performed by: INTERNAL MEDICINE

## 2023-04-18 PROCEDURE — 25010000002 PROPOFOL 10 MG/ML EMULSION: Performed by: ANESTHESIOLOGY

## 2023-04-18 PROCEDURE — 88312 SPECIAL STAINS GROUP 1: CPT | Performed by: INTERNAL MEDICINE

## 2023-04-18 PROCEDURE — 45385 COLONOSCOPY W/LESION REMOVAL: CPT | Performed by: INTERNAL MEDICINE

## 2023-04-18 PROCEDURE — 82962 GLUCOSE BLOOD TEST: CPT

## 2023-04-18 PROCEDURE — 43239 EGD BIOPSY SINGLE/MULTIPLE: CPT | Performed by: INTERNAL MEDICINE

## 2023-04-18 RX ORDER — GLYCOPYRROLATE 0.2 MG/ML
INJECTION INTRAMUSCULAR; INTRAVENOUS AS NEEDED
Status: DISCONTINUED | OUTPATIENT
Start: 2023-04-18 | End: 2023-04-18 | Stop reason: SURG

## 2023-04-18 RX ORDER — LIDOCAINE HYDROCHLORIDE 20 MG/ML
INJECTION, SOLUTION INFILTRATION; PERINEURAL AS NEEDED
Status: DISCONTINUED | OUTPATIENT
Start: 2023-04-18 | End: 2023-04-18 | Stop reason: SURG

## 2023-04-18 RX ORDER — PROPOFOL 10 MG/ML
VIAL (ML) INTRAVENOUS AS NEEDED
Status: DISCONTINUED | OUTPATIENT
Start: 2023-04-18 | End: 2023-04-18 | Stop reason: SURG

## 2023-04-18 RX ORDER — SODIUM CHLORIDE, SODIUM LACTATE, POTASSIUM CHLORIDE, CALCIUM CHLORIDE 600; 310; 30; 20 MG/100ML; MG/100ML; MG/100ML; MG/100ML
1000 INJECTION, SOLUTION INTRAVENOUS CONTINUOUS
Status: DISCONTINUED | OUTPATIENT
Start: 2023-04-18 | End: 2023-04-18 | Stop reason: HOSPADM

## 2023-04-18 RX ADMIN — GLYCOPYRROLATE 0.2 MG: 0.2 INJECTION INTRAMUSCULAR; INTRAVENOUS at 11:51

## 2023-04-18 RX ADMIN — LIDOCAINE HYDROCHLORIDE 100 MG: 20 INJECTION, SOLUTION INFILTRATION; PERINEURAL at 11:56

## 2023-04-18 RX ADMIN — PROPOFOL 600 MG: 10 INJECTION, EMULSION INTRAVENOUS at 11:59

## 2023-04-18 RX ADMIN — SODIUM CHLORIDE, POTASSIUM CHLORIDE, SODIUM LACTATE AND CALCIUM CHLORIDE 1000 ML: 600; 310; 30; 20 INJECTION, SOLUTION INTRAVENOUS at 11:30

## 2023-04-18 NOTE — DISCHARGE INSTRUCTIONS
For the next 24 hours patient needs to be with a responsible adult.    For 24 hours DO NOT drive, operate machinery, appliances, drink alcohol, make important decisions or sign legal documents.    Start with a light or bland diet if you are feeling sick to your stomach otherwise advance to regular diet as tolerated.    Follow recommendations on procedure report if provided by your doctor.    Call Dr DIAZ for problems [phone]. AWAIT PATHOLOGY RESULT IN 7-10 DAYS.    Problems may include but not limited to: large amounts of bleeding, trouble breathing, repeated vomiting, severe unrelieved pain, fever or chills.

## 2023-04-18 NOTE — ANESTHESIA POSTPROCEDURE EVALUATION
"Patient: Frank Patel    Procedure Summary     Date: 04/18/23 Room / Location:  [facility] /  [facility]    Anesthesia Start: 1149 Anesthesia Stop: 1230    Procedures:       ESOPHAGOGASTRODUODENOSCOPY WITH BIOPSIES (Esophagus)      COLONOSCOPY INTO CECUM WITH POLYPECTOMY (COLD SNARE) Diagnosis:       Iron deficiency anemia, unspecified iron deficiency anemia type      (Iron deficiency anemia, unspecified iron deficiency anemia type [D50.9])    Surgeons: Juan Ramon Munroe MD Provider: Jose D Govea MD    Anesthesia Type: MAC ASA Status: 3          Anesthesia Type: MAC    Vitals  Vitals Value Taken Time   /73 04/18/23 1251   Temp     Pulse 71 04/18/23 1251   Resp 16 04/18/23 1251   SpO2 98 % 04/18/23 1251           Post Anesthesia Care and Evaluation    Patient location during evaluation: bedside  Patient participation: complete - patient participated  Level of consciousness: awake and alert  Pain management: adequate    Airway patency: patent  Anesthetic complications: No anesthetic complications    Cardiovascular status: acceptable  Respiratory status: acceptable  Hydration status: acceptable    Comments: /73 (BP Location: Left arm, Patient Position: Lying)   Pulse 71   Resp 16   Ht 177.8 cm (70\")   Wt 98.9 kg (218 lb)   SpO2 98%   BMI 31.28 kg/m²       "

## 2023-04-18 NOTE — ANESTHESIA PREPROCEDURE EVALUATION
Anesthesia Evaluation     Patient summary reviewed and Nursing notes reviewed   NPO Solid Status: > 8 hours             Airway   Mallampati: II  TM distance: >3 FB  Neck ROM: full  no difficulty expected  Dental - normal exam     Pulmonary - normal exam   Cardiovascular - normal exam    (+) hypertension, past MI , CAD, dysrhythmias Atrial Fib, hyperlipidemia,       Neuro/Psych  GI/Hepatic/Renal/Endo    (+) obesity,  GERD,  renal disease CRI, diabetes mellitus type 2,     Musculoskeletal     (+) neck pain,   Abdominal  - normal exam   Substance History      OB/GYN          Other                        Anesthesia Plan    ASA 3     MAC     intravenous induction     Anesthetic plan, risks, benefits, and alternatives have been provided, discussed and informed consent has been obtained with: patient.        CODE STATUS:

## 2023-04-19 LAB
LAB AP CASE REPORT: NORMAL
PATH REPORT.ADDENDUM SPEC: NORMAL
PATH REPORT.FINAL DX SPEC: NORMAL
PATH REPORT.GROSS SPEC: NORMAL

## 2023-05-03 ENCOUNTER — TELEPHONE (OUTPATIENT)
Dept: GASTROENTEROLOGY | Facility: CLINIC | Age: 68
End: 2023-05-03
Payer: COMMERCIAL

## 2023-05-03 DIAGNOSIS — K29.70 HELICOBACTER PYLORI GASTRITIS: Primary | ICD-10-CM

## 2023-05-03 DIAGNOSIS — B96.81 HELICOBACTER PYLORI GASTRITIS: Primary | ICD-10-CM

## 2023-05-03 NOTE — TELEPHONE ENCOUNTER
Patient called. No answer. Left message requesting call back.   Repeat c/s 7 rs added to recall 4/18/30

## 2023-05-03 NOTE — TELEPHONE ENCOUNTER
----- Message from Juan Ramon Munroe MD sent at 5/1/2023 11:55 AM EDT -----  Esophagitis without Barretts  H pylori gastritis - please prescribe Prevpac with f/u HPBT as per protocol  Colon polyp was adenoma - recall 7 years

## 2023-05-04 ENCOUNTER — TELEPHONE (OUTPATIENT)
Dept: GASTROENTEROLOGY | Facility: CLINIC | Age: 68
End: 2023-05-04

## 2023-05-04 RX ORDER — CLARITHROMYCIN 500 MG/1
500 TABLET, COATED ORAL 2 TIMES DAILY
Qty: 28 TABLET | Refills: 0 | Status: SHIPPED | OUTPATIENT
Start: 2023-05-04

## 2023-05-04 RX ORDER — AMOXICILLIN 500 MG/1
1000 CAPSULE ORAL 2 TIMES DAILY
Qty: 56 CAPSULE | Refills: 0 | Status: SHIPPED | OUTPATIENT
Start: 2023-05-04

## 2023-05-04 NOTE — TELEPHONE ENCOUNTER
"  Caller: Frank Patel \"[place]\"    Relationship to patient: Self    Best call back number: [phone]    Patient is needing: PT CALLING BACK FOR TEST RESULTS. PLEASE CALL BACK AND ADVISE         "

## 2023-05-04 NOTE — TELEPHONE ENCOUNTER
Returned patient's phone call.   Advised as per Dr. Munroe's note. He verb understanding.     Instructed patient he is to hold his Atorvastatin for the 14 days he is on Clarithromycin. Advised he may resume on day 14. He verb understanding.     Patient instructed to hold Pantoprazole starting 6/26 and may resume after his breath test on 07/10. He verb understanding.

## 2023-08-04 ENCOUNTER — TELEPHONE (OUTPATIENT)
Dept: GASTROENTEROLOGY | Facility: CLINIC | Age: 68
End: 2023-08-04
Payer: COMMERCIAL

## 2023-10-09 ENCOUNTER — LAB (OUTPATIENT)
Dept: LAB | Facility: HOSPITAL | Age: 68
End: 2023-10-09
Payer: COMMERCIAL

## 2023-10-09 ENCOUNTER — OFFICE VISIT (OUTPATIENT)
Dept: ONCOLOGY | Facility: CLINIC | Age: 68
End: 2023-10-09
Payer: COMMERCIAL

## 2023-10-09 VITALS
HEART RATE: 57 BPM | BODY MASS INDEX: 32.37 KG/M2 | HEIGHT: 70 IN | TEMPERATURE: 99.1 F | SYSTOLIC BLOOD PRESSURE: 134 MMHG | OXYGEN SATURATION: 95 % | DIASTOLIC BLOOD PRESSURE: 87 MMHG | WEIGHT: 226.1 LBS

## 2023-10-09 DIAGNOSIS — D72.829 LEUKOCYTOSIS, UNSPECIFIED TYPE: ICD-10-CM

## 2023-10-09 DIAGNOSIS — D50.9 IRON DEFICIENCY ANEMIA, UNSPECIFIED IRON DEFICIENCY ANEMIA TYPE: ICD-10-CM

## 2023-10-09 DIAGNOSIS — D50.9 IRON DEFICIENCY ANEMIA, UNSPECIFIED IRON DEFICIENCY ANEMIA TYPE: Primary | ICD-10-CM

## 2023-10-09 DIAGNOSIS — E83.42 HYPOMAGNESEMIA: ICD-10-CM

## 2023-10-09 LAB
ANION GAP SERPL CALCULATED.3IONS-SCNC: 12.7 MMOL/L (ref 5–15)
BASOPHILS # BLD AUTO: 0.17 10*3/MM3 (ref 0–0.2)
BASOPHILS NFR BLD AUTO: 1.3 % (ref 0–1.5)
BUN SERPL-MCNC: 24 MG/DL (ref 8–23)
BUN/CREAT SERPL: 19.8 (ref 7–25)
CALCIUM SPEC-SCNC: 9.6 MG/DL (ref 8.6–10.5)
CHLORIDE SERPL-SCNC: 102 MMOL/L (ref 98–107)
CO2 SERPL-SCNC: 27.3 MMOL/L (ref 22–29)
CREAT SERPL-MCNC: 1.21 MG/DL (ref 0.7–1.3)
DEPRECATED RDW RBC AUTO: 49.4 FL (ref 37–54)
EGFRCR SERPLBLD CKD-EPI 2021: 66 ML/MIN/1.73
EOSINOPHIL # BLD AUTO: 1.98 10*3/MM3 (ref 0–0.4)
EOSINOPHIL NFR BLD AUTO: 14.8 % (ref 0.3–6.2)
ERYTHROCYTE [DISTWIDTH] IN BLOOD BY AUTOMATED COUNT: 14.2 % (ref 12.3–15.4)
FERRITIN SERPL-MCNC: 112 NG/ML (ref 30–400)
GLUCOSE SERPL-MCNC: 128 MG/DL (ref 65–99)
HCT VFR BLD AUTO: 43.5 % (ref 37.5–51)
HGB BLD-MCNC: 14.2 G/DL (ref 13–17.7)
IMM GRANULOCYTES # BLD AUTO: 0.05 10*3/MM3 (ref 0–0.05)
IMM GRANULOCYTES NFR BLD AUTO: 0.4 % (ref 0–0.5)
IRON 24H UR-MRATE: 58 MCG/DL (ref 59–158)
IRON SATN MFR SERPL: 16 % (ref 20–50)
LYMPHOCYTES # BLD AUTO: 4.34 10*3/MM3 (ref 0.7–3.1)
LYMPHOCYTES NFR BLD AUTO: 32.4 % (ref 19.6–45.3)
MAGNESIUM SERPL-MCNC: 1.8 MG/DL (ref 1.6–2.4)
MCH RBC QN AUTO: 31.3 PG (ref 26.6–33)
MCHC RBC AUTO-ENTMCNC: 32.6 G/DL (ref 31.5–35.7)
MCV RBC AUTO: 95.8 FL (ref 79–97)
MONOCYTES # BLD AUTO: 1.32 10*3/MM3 (ref 0.1–0.9)
MONOCYTES NFR BLD AUTO: 9.9 % (ref 5–12)
NEUTROPHILS NFR BLD AUTO: 41.2 % (ref 42.7–76)
NEUTROPHILS NFR BLD AUTO: 5.53 10*3/MM3 (ref 1.7–7)
NRBC BLD AUTO-RTO: 0.1 /100 WBC (ref 0–0.2)
PLATELET # BLD AUTO: 363 10*3/MM3 (ref 140–450)
PMV BLD AUTO: 10.7 FL (ref 6–12)
POTASSIUM SERPL-SCNC: 4.5 MMOL/L (ref 3.5–5.2)
RBC # BLD AUTO: 4.54 10*6/MM3 (ref 4.14–5.8)
SODIUM SERPL-SCNC: 142 MMOL/L (ref 136–145)
TIBC SERPL-MCNC: 356 MCG/DL (ref 298–536)
TRANSFERRIN SERPL-MCNC: 254 MG/DL (ref 200–360)
WBC NRBC COR # BLD: 13.39 10*3/MM3 (ref 3.4–10.8)

## 2023-10-09 PROCEDURE — 84466 ASSAY OF TRANSFERRIN: CPT

## 2023-10-09 PROCEDURE — 85025 COMPLETE CBC W/AUTO DIFF WBC: CPT

## 2023-10-09 PROCEDURE — 36415 COLL VENOUS BLD VENIPUNCTURE: CPT

## 2023-10-09 PROCEDURE — 83735 ASSAY OF MAGNESIUM: CPT

## 2023-10-09 PROCEDURE — 83540 ASSAY OF IRON: CPT

## 2023-10-09 PROCEDURE — 80048 BASIC METABOLIC PNL TOTAL CA: CPT | Performed by: INTERNAL MEDICINE

## 2023-10-09 PROCEDURE — 82728 ASSAY OF FERRITIN: CPT

## 2023-10-09 NOTE — PROGRESS NOTES
.     REASON FOR FOLLOWUP:     * Chronic leukocytosis.   *Iron deficiency anemia, started on oral iron 8/1/2022.  *Hypomagnesemia.  Started on oral magnesium 8/1/2022.      HISTORY OF PRESENT ILLNESS:  The patient is a 68 y.o. year old male with hypertension, hyperlipidemia, diabetes, coronary artery disease, atrial fibrillation, who presented today on 10/09/2023 for a 6-month follow-up evaluation.    Patient reports that he has some weight fluctuation usually within 5 pounds. He has been eating well.    Patient had both EGD and colonoscopy examination after his visit last time and I reviewed the procedure reports, which showed inflammation in the stomach but normal esophagus and duodenum. In the colon, there was a small polyp resected. Pathology evaluation from the biopsy samples 04/18/2023 reported acute erosive gastritis. H. pylori was seen after the sample from the stomach and body. There was no evidence for intestinal metaplasia. The cecum polyp was tubular adenoma.    Patient reports he was given antibiotics cocktail for treating H. pylori.    Laboratory study today on 10/09/2023 reported a persistent mild leukocytosis, WBC 13,390 including neutrophils 5530, lymphocytes 4330, monocytes 1320 and eosinophil 1980. Hemoglobin 14.2, MCV 95.8, and the platelets 363,000. Iron studies showed a ferritin 112 and free iron 58, TIBC 356, iron saturation 16%. Magnesium is 1.8.      Past Medical History:   Diagnosis Date    Acute MI, inferoposterior wall     Atrial fibrillation     BPH (benign prostatic hyperplasia)     DDD (degenerative disc disease), lumbar     Diabetes     Elevated cholesterol     GERD (gastroesophageal reflux disease)     Head trauma 2007    Hemorrhoids     History of shingles 2012    History of snoring     HTN (hypertension)     Hyperlipidemia     Hypogonadism in male     Kidney stone     Muscle spasm     Ventricular fibrillation      Past Surgical History:   Procedure Laterality Date     APPENDECTOMY      CARDIAC CATHETERIZATION N/A 08/18/2019    Procedure: Left Heart Cath;  Surgeon: Rod Gomes MD;  Location[facility];  Service: Cardiology    CARDIAC CATHETERIZATION N/A 08/18/2019    Procedure: Stent ROBY coronary;  Surgeon: Rod Gomes MD;  [facility];  Service: Cardiology    CARDIAC CATHETERIZATION N/A 08/18/2019    Procedure: Coronary angiography;  Surgeon: Rod Gomes MD;  [facility];  Service: Cardiology    CARDIAC CATHETERIZATION N/A 08/18/2019    Procedure: Left ventriculography;  Surgeon: Rod Gomes MD;  Location: [facility] CATH INVASIVE LOCATION;  Service: Cardiology    COLONOSCOPY  2010    COLONOSCOPY W/ POLYPECTOMY N/A 4/18/2023    Procedure: COLONOSCOPY INTO CECUM WITH POLYPECTOMY (COLD SNARE);  Surgeon: Juan Ramon Munroe MD;  Location: [facility] ENDOSCOPY;  Service: Gastroenterology;  Laterality: N/A;  PRE: ANEMIA  POST: POLYP, HEMORRHOIDS    ENDOSCOPY N/A 4/18/2023    Procedure: ESOPHAGOGASTRODUODENOSCOPY WITH BIOPSIES;  Surgeon: Juan Ramon Munroe MD;  Location: [facility] ENDOSCOPY;  Service: Gastroenterology;  Laterality: N/A;  PRE: ANEMIA  POST: GASTRITIS    FEMUR FRACTURE SURGERY Right 1990    HAND SURGERY Right     wrist    ORIF FINGER / THUMB FRACTURE Right     SPLENECTOMY  2007    TONSILLECTOMY     Tonsillectomy 1962 in [place].  Appendectomy in 1977 in [place].  Right leg fracture and ORIF in 1989 in [place].  Splenectomy in June 2007 in [facility]  Cardiac catheterization with 2 stents placement August 2019 at [facility] Dr. Rod Gomes.  Risk for surgery in September 2021 at [facility] Dr. Moncho Velarde.  Colonoscopy 2010.      HEMATOLOGY HISTORY: The patient is a 67 y.o. year old male with hypertension, hyperlipidemia, diabetes, coronary artery disease, atrial fibrillation, who presented on 8/1/2022 for initial evaluation  because of chronic leukocytosis.     Patient reports he has problem with leukocytes for many years and previously was seen by Dr. Ugo Nugent at [facility] and also had bone marrow aspiration and biopsy. The patient was told having some abnormalities with his blood, however, no need for treatment. He was followed by Dr. Nugent for a few times and then stopped going. I searched medical records in EMR system in [facility] and his last visit with Dr. Ugo Nugent was on 09/17/2017. According to the notes the patient had bone marrow aspiration and biopsy.     Patient originally was seen by Dr. Nugent on 02/15/2017 for leukocytosis. His peripheral blood flow cytometry study reported monoclonal T cell population, clonal T-LGL. Subsequently the patient had bone marrow aspiration and biopsy on 03/18/2017 with no obvious increase of blasts and also no increase of mast cell infiltrates. No evidence for metastatic tumor, granulomata or malignant lymphoma. No evidence for T-LGL leukemia. There was certainly maturation abnormalities noted in the megakaryocytic and erythroid lineage with less than 5% blasts. No excessive monoclonal T cell population. Chromosome study was normal male karyotype. FISH panel was negative for rearrangement of PDGFRB, FGFR1 and negative for deletion of 4q12. Dr. Nugent recommended annual followup with flow cytometry study.    I reviewed laboratory results in the [facility] EMR system. CBC study from 02/13/2012 reported WBC 11,740 including neutrophils 5440, lymphocytes 4260, monocytes 860, eosinophils 910 and basophils 230. Had hemoglobin 16.0, hematocrit 48.1, and platelets 477,000. On 10/19/2012, patient had WBC 11,300 including ANC 6800, lymphocytes 4200, monocytes 400, normal platelets 375,000 and hemoglobin 16.2. Lab study on 04/16/2013 reported hemoglobin 16.0, platelets 367,000, WBC 10,900 including granulocytes 5600, lymphocytes 4500, monocytes 800.     On 02/15/2017 when  he was seen by Dr. Ugo Nugent, laboratory study reported total WBC 13,730 including ANC 5360, lymphocytes 5230, monocytes 1380, eosinophils 1670 and basophils 90. Hemoglobin was 15.5, MCV was 91.5, and platelets 481,000. Normal C-reactive protein 0.8 mg/dL. Protein electrophoresis reported normal SPEP without M spike. Protein immunofixation was also negative. Had serum IgG 1116, IgA 370, decreased IgM 24 mg/dL. Peripheral blood flow cytometry study reported clonal T cell population in CD3 and CD8, positive T cells; also positive for CD2, CD11C, and CD5 (dim), CD7, CD56 (dim), CD45, CD57 (bright), CD26 (partial) and TCR alpha/beta.    This patient subsequently had bone marrow aspiration and biopsy on 03/13/2017 at [facility]. Chromosomal analysis reported normal male karyotype. FISH study was negative for deletion of 4q12 including CHIC2, and negative for fusion of FIP1L1 and PDGFRA genes. Also negative for rearrangement of PDGFRB, FGFR1. The bone marrow biopsy reported variably cellular bone marrow 30-40% with trilineage hematopoiesis. Decreased iron stores. The flow cytometry study of the bone marrow aspiration, however, did not show the clonal T-LGL population. No evidence for T-LGL leukemia or malignant lymphoma.     Lab study on 03/12/2018 reported WBC 12,400 with granulocytes 6500, lymphocytes 5300, monocytes 600 without eosinophils population. Hemoglobin was 16.7, MCV 90.6, platelets 475,000.     CBC on 03/11/2019 reported WBC 12,470, granulocytes 4740, lymphocytes 2540, monocytes 1770 and eosinophils 1870, basophil 110. Atypical lymphocytes was 1430. Hemoglobin was 15.9, MCV 91.0, platelets 460,000.     CBC study on 07/01/2020 reported WBC 15,420, ANC 8110, lymphocytes 5130, monocytes 820, eosinophils 1220, basophils 140. Hemoglobin was 15.2 and platelets 415,000.     Laboratory study on 02/22/2021 reported WBC 15,740, ANC 6570, lymphocytes 2160, monocytes 360, eosinophils 2870,  basophils 230, atypical lymphocytes 1550, platelets 451,000 and hemoglobin 13.7 with MCV 95.6, MCHC 31.7. His B12 level was 385 pg/mL. Chemistry lab reported glucose 121, otherwise unremarkable CMP including normal liver function panel.    Laboratory study on 06/21/2021 reported worsening anemia, hemoglobin 13.1, MCV 94.7, platelets 447,000, WBC 14,530 including ANC 6310, lymphocytes 4110, monocytes 770, eosinophils 1800, basophils 260, atypical lymphocytes 1280 and platelets 447,000. Vitamin B12 level was 435 pg/mL. No iron studies, no folate.     Laboratory study on 04/18/2022 reported hemoglobin 13.4, MCV 93.9, platelets 456,000, WBC 15,940 including ANC 7140 and lymphocytes 6220, monocytes 1090, eosinophils 1240, basophils 190, platelets 456,000. Hemoglobin 13.4, MCV 93.9, MCHC 31.1. CMP on the same day reported creatinine 1.12, glucose 111, calcium 10.3, total protein 7.5, albumin 4.8, globulin 2.6, otherwise unremarkable.     Laboratory study today in our clinic on 08/01/2022 reported further worsening anemia with hemoglobin 11.5, MCV 93.1, normal platelets 399,000, WBC 13,730 including neutrophils 4790, lymphocytes 5320, monocytes 1370, eosinophils 1910 and basophils 240, immature granulocytes 100.     I reviewed his peripheral blood smear on 08/01/2022. There are significant anisocytosis of RBC, jose-shaped RBC, spike cells. Occasional schistocytes. No target cells. No clustering of platelets. Morphology of WBC shows increased eosinophils and lymphocytes, however, no blasts. No increased metamyelocytes and promyelocytes.     I saw him originally on 8/1/2022 for initial evaluation because of chronic leukocytosis.  Laboratory studies reported severe iron deficiency with a ferritin 9.5 ng/mL, iron saturation 8% with free iron 43 TIBC 521.  Patient had a normal folate and mediocre B12 level.  We will start the patient on ferrous sulfate 325 mg 3 times a day.    She also was found to having hypomagnesemia 1.6 and  we started patient on oral magnesium 400 mg twice a day.    His stool samples submitted on 8/23/2022 was tested negative for occult blood.      Laboratory study today on 10/10/2022 reported much improved ferritin 55.6 ng/mL, iron saturation 17% with free iron 68 and iron saturation 393 together with improved hemoglobin 13.3.  However he has persistent mild leukocytosis WBC 11,640, including ANC 5300 lymphocytes 4150, monocytes 1100 and eosinophil 950.    Laboratory study on 04/10/2023 reported further improved hemoglobin 14.1, MCV 94.9. Patient has normal platelets 412,000. He also has worsening leukocytosis, WBC 15,980 including neutrophils 8,970, lymphocytes 4,780, monocytes 1,170, eosinophil 840, and immature granulocytes 70. Iron study is pending, so his magnesium level.          MEDICATIONS    Current Outpatient Medications:     acetaminophen (TYLENOL) 500 MG tablet, Take 2 tablets by mouth., Disp: , Rfl:     atorvastatin (LIPITOR) 40 MG tablet, TAKE 1 TABLET BY MOUTH AT  NIGHT, Disp: 90 tablet, Rfl: 3    baclofen (LIORESAL) 10 MG tablet, Take 1 tablet by mouth 3 (Three) Times a Day., Disp: , Rfl:     clopidogrel (PLAVIX) 75 MG tablet, TAKE 1 TABLET BY MOUTH  DAILY, Disp: 90 tablet, Rfl: 3    ferrous sulfate 325 (65 FE) MG tablet, Take 1 tablet by mouth 2 (Two) Times a Day With Meals., Disp: 180 tablet, Rfl: 1    fluticasone (FLONASE) 50 MCG/ACT nasal spray, 2 sprays into the nostril(s) as directed by provider Daily As Needed for Rhinitis., Disp: , Rfl:     gabapentin (NEURONTIN) 300 MG capsule, , Disp: , Rfl:     hydroCHLOROthiazide (HYDRODIURIL) 25 MG tablet, Take 1 tablet by mouth Daily., Disp: , Rfl:     HYDROcodone-acetaminophen (NORCO)  MG per tablet, Take 1 tablet by mouth Every 6 (Six) Hours As Needed for Moderate Pain., Disp: , Rfl:     lisinopril (PRINIVIL,ZESTRIL) 5 MG tablet, Take 1 tablet by mouth Daily., Disp: , Rfl:     magnesium oxide (MAG-OX) 400 MG tablet, Take 2 tablets by mouth 2 (Two)  "Times a Day., Disp: 360 tablet, Rfl: 1    metoprolol succinate XL (TOPROL-XL) 25 MG 24 hr tablet, TAKE ONE AND ONE-HALF TABLETS DAILY, Disp: 135 tablet, Rfl: 2    pantoprazole (PROTONIX) 40 MG EC tablet, Take 1 tablet by mouth Daily., Disp: , Rfl:     tamsulosin (FLOMAX) 0.4 MG capsule 24 hr capsule, Take 1 capsule by mouth Every Night., Disp: , Rfl:     ALLERGIES:     Allergies   Allergen Reactions    Topiramate Other (See Comments)     irritability   Mild allergy to milk and dairy product.    SOCIAL HISTORY:       Social History     Socioeconomic History    Marital status:      Spouse name: Elvira   Tobacco Use    Smoking status: Former     Packs/day: 1.5     Types: Cigarettes     Quit date: 1999     Years since quittin.1    Smokeless tobacco: Never    Tobacco comments:     CAFFEINE USE: 1-2 16OZ COKES DAILY   Vaping Use    Vaping Use: Never used   Substance and Sexual Activity    Alcohol use: Yes     Alcohol/week: 3.0 standard drinks of alcohol     Types: 3 Cans of beer per week     Comment: rarely    Drug use: Never    Sexual activity: Defer   Quit smoking , smoked 1-1/2 pack a day.  No vaping.      FAMILY HISTORY:  Family History   Problem Relation Age of Onset    Hyperlipidemia Mother     Hypertension Mother     Heart disease Mother     Diabetes Mother     Heart failure Mother     Heart attack Mother    Mother had a hypertension, hyperlipidemia, heart attack, diabetes and obesity.  Aunt has diabetes.     REVIEW OF SYSTEMS:  See HPI.           Vitals:    10/09/23 1327   BP: 134/87   Pulse: 57   Temp: 99.1 °F (37.3 °C)   TempSrc: Temporal   SpO2: 95%   Weight: 103 kg (226 lb 1.6 oz)   Height: 177.8 cm (70\")   PainSc:   5   PainLoc: Back  Comment: and his thumb has been bothering         10/9/2023     1:27 PM   Current Status   ECOG score 0      PHYSICAL EXAM: 10/9/2023  GENERAL:  Well-developed, well-nourished in no acute distress.  Orientated to time place and the people.  SKIN:  Warm, dry " without rashes, purpura or petechiae.  HEENT:  Normocephalic.   LYMPHATICS:  No cervical, supraclavicular adenopathy.  CHEST: Normal respiratory effort.  Lungs clear to auscultation. Good airflow.  CARDIAC:  Regular rate and rhythm. Normal S1,S2.  ABDOMEN:  Soft, nontender with no organomegaly or masses.  Bowel sounds normal.  EXTREMITIES:  No lower extremity edema.      RECENT LABS:  WBC   Date Value Ref Range Status   10/09/2023 13.39 (H) 3.40 - 10.80 10*3/mm3 Final   04/24/2023 15.77 (H) 4.5 - 11.0 10*3/uL Final     RBC   Date Value Ref Range Status   10/09/2023 4.54 4.14 - 5.80 10*6/mm3 Final   04/24/2023 4.62 4.5 - 5.9 10*6/uL Final     Hemoglobin   Date Value Ref Range Status   10/09/2023 14.2 13.0 - 17.7 g/dL Final   04/24/2023 14.2 13.5 - 17.5 g/dL Final     Hematocrit   Date Value Ref Range Status   10/09/2023 43.5 37.5 - 51.0 % Final   04/24/2023 44.9 41.0 - 53.0 % Final     MCV   Date Value Ref Range Status   10/09/2023 95.8 79.0 - 97.0 fL Final   04/24/2023 97.2 80.0 - 100.0 fL Final     MCH   Date Value Ref Range Status   10/09/2023 31.3 26.6 - 33.0 pg Final   04/24/2023 30.7 26.0 - 34.0 pg Final     MCHC   Date Value Ref Range Status   10/09/2023 32.6 31.5 - 35.7 g/dL Final   04/24/2023 31.6 31.0 - 37.0 g/dL Final     RDW   Date Value Ref Range Status   10/09/2023 14.2 12.3 - 15.4 % Final   04/24/2023 13.7 12.0 - 16.8 % Final     RDW-SD   Date Value Ref Range Status   10/09/2023 49.4 37.0 - 54.0 fl Final     MPV   Date Value Ref Range Status   10/09/2023 10.7 6.0 - 12.0 fL Final   04/24/2023 11.6 8.4 - 12.4 fL Final     Platelets   Date Value Ref Range Status   10/09/2023 363 140 - 450 10*3/mm3 Final   04/24/2023 409 140 - 440 10*3/uL Final     Neutrophil Rel %   Date Value Ref Range Status   04/24/2023 42.2 (L) 45 - 80 % Final     Neutrophil %   Date Value Ref Range Status   10/09/2023 41.2 (L) 42.7 - 76.0 % Final     Lymphocyte Rel %   Date Value Ref Range Status   04/24/2023 38.9 15 - 50 % Final      Lymphocyte %   Date Value Ref Range Status   10/09/2023 32.4 19.6 - 45.3 % Final     Monocyte Rel %   Date Value Ref Range Status   04/24/2023 8.8 0 - 15 % Final     Monocyte %   Date Value Ref Range Status   10/09/2023 9.9 5.0 - 12.0 % Final     Eosinophil %   Date Value Ref Range Status   10/09/2023 14.8 (H) 0.3 - 6.2 % Final   04/24/2023 8.2 (H) 0 - 7 % Final     Basophil Rel %   Date Value Ref Range Status   04/24/2023 1.5 0 - 2 % Final     Basophil %   Date Value Ref Range Status   10/09/2023 1.3 0.0 - 1.5 % Final     Immature Grans %   Date Value Ref Range Status   10/09/2023 0.4 0.0 - 0.5 % Final   04/24/2023 0.4 0.0 - 1.0 % Final     Neutrophils Absolute   Date Value Ref Range Status   04/24/2023 6.66 2.0 - 8.8 10*3/uL Final     Neutrophils, Absolute   Date Value Ref Range Status   10/09/2023 5.53 1.70 - 7.00 10*3/mm3 Final     Lymphocytes Absolute   Date Value Ref Range Status   04/24/2023 6.14 (H) 0.7 - 5.5 10*3/uL Final     Lymphocytes, Absolute   Date Value Ref Range Status   10/09/2023 4.34 (H) 0.70 - 3.10 10*3/mm3 Final     Monocytes Absolute   Date Value Ref Range Status   04/24/2023 1.38 0.0 - 1.7 10*3/uL Final     Monocytes, Absolute   Date Value Ref Range Status   10/09/2023 1.32 (H) 0.10 - 0.90 10*3/mm3 Final     Eosinophils Absolute   Date Value Ref Range Status   04/24/2023 1.30 (H) 0.0 - 0.8 10*3/uL Final     Eosinophils, Absolute   Date Value Ref Range Status   10/09/2023 1.98 (H) 0.00 - 0.40 10*3/mm3 Final     Basophils Absolute   Date Value Ref Range Status   04/24/2023 0.23 (H) 0.0 - 0.2 10*3/uL Final     Basophils, Absolute   Date Value Ref Range Status   10/09/2023 0.17 0.00 - 0.20 10*3/mm3 Final     Immature Grans, Absolute   Date Value Ref Range Status   10/09/2023 0.05 0.00 - 0.05 10*3/mm3 Final   04/24/2023 0.06 0.00 - 0.10 10*3/uL Final     nRBC   Date Value Ref Range Status   10/09/2023 0.1 0.0 - 0.2 /100 WBC Final     Lab Results   Component Value Date    GLUCOSE 126 (H)  08/01/2022    CALCIUM 9.2 08/01/2022     08/01/2022    K 5.3 (H) 08/01/2022    CO2 25.1 08/01/2022     (H) 08/01/2022    BUN 18 08/01/2022    CREATININE 1.39 (H) 08/01/2022    EGFR 56.6 (L) 08/01/2022    BCR 12.9 08/01/2022    ANIONGAP 10.9 08/01/2022      Latest Reference Range & Units 10/09/23 12:55   Magnesium 1.6 - 2.4 mg/dL 1.8       Lab Results   Component Value Date    IRON 58 (L) 10/09/2023    TIBC 356 10/09/2023    FERRITIN 112.00 10/09/2023   Iron saturation 16% on 10/9/2023.    Lab Results   Component Value Date    [id number] 634 04/24/2023     Lab Results   Component Value Date    FOLATE 8.77 08/01/2022           Assessment & Plan     ASSESSMENT:    1. Leukocytosis with eosinophilia, monocytosis, and lymphocytosis.   This patient has problem since at least 2013 when he was seen by Dr. Ugo Nugent at [facility] in 2017 with both peripheral blood flow cytometry study in 02/2017 and subsequently bone marrow aspiration and biopsy in 03/2013 which reported no evidence for leukemia or lymphoma in the bone marrow despite previous peripheral blood showed monoclonal T cell LGL.   Bone marrow examination in March 2017, FISH study was negative for deletion of 4q12 including CHIC2, and negative for fusion of FIP1L1 and PDGFRA genes. Also negative for rearrangement of PDGFRB, FGFR1.  Reviewed his laboratory studies, his WBC, eosinophilia, monocytosis, lymphocytosis are relatively stable with fluctuation in numbers.   Patient reports on 8/1/2022 he is feeling relatively well. No fever, sweating or chills. He has no significant weight loss. I think we can monitor for now. I will focus on the anemia workup first. If there is no explainable reason for his anemia, we likely will need to repeat bone marrow aspiration and biopsy.   I do think this patient likely has myeloproliferative disorder.  Laboratory study today on 04/10/2023 reported WBC 15,980 including ANC 1870, lymphocytes 4780, monocytes 1170  eosinophils 840.  Patient reports stable clinical condition.  Continue to monitor.  10/09/2023, patient has overall improved WBC at 13,390, however, slightly worsening eosinophil 1980, normalized and further improved neutrophils 5330, improved lymphocytes 4340, and stable monocytes 1320. Patient reports no weight loss and no other symptoms.      2. Iron deficiency anemia.   This is relatively new for the past 2 years. His hemoglobin has been gradually trending down.   He reports no melena or hematochezia. He does complain of fatigue.   I reviewed his peripheral blood smear on 8/1/2022.  I suspect that he may have iron deficiency with jose-shaped RBC. I recommended to obtain laboratory studies first with serum ferritin, iron profile, vitamin B12, and folate. We will also check CMP and LDH.   8/1/2022 lab studies reported iron deficiency with ferritin of 105 iron saturation 8% free iron 43 TIBC 400, B12 level 114 and folate level 8.7.  Patient was started on oral ferrous sulfate 325 mg.    On 10/10/2022 patient reports excellent tolerance to oral iron treatment.  Iron study also showed much improved ferritin 55.6 and low normal iron saturation 17%.  He also has much improved hemoglobin 13.3.  On 8/1/2022 laboratory study reported iron deficiency with free iron 43, iron saturation 8%, TIBC 521 and ferritin 9.5 ng/mL. He had normal folate 8.7 ng/mL and low normal B12 level at 414 pg/mL. Also reported low magnesium at 1.6, creatinine 1.39, potassium 5.3, normal sodium, and calcium 9.2, and normal LDH at 187. This patient clearly has iron deficiency.  No evidence for hemolysis.   Laboratory study today on 04/10/2023 reported further improved hemoglobin 14.1. His iron study also came back with ferritin 101.5, iron saturation 21%, free iron 86, and TIBC of 406. Patient reports he mostly takes oral iron only once a day, occasionally twice a day. I think with further improved hemoglobin and iron numbers, once a day would be  good enough.  Lab study today on 10/09/2023 reported normal hemoglobin 14.2, MCV 95.8, ferritin 112 and free iron 58, TIBC 356, iron saturation 16% improved. Patient reports continues taking oral iron twice a day.    3.  Hypomagnesemia.    Magnesium 1.6 today on 8/1/2022.  Etiology is not clear. Nevertheless I recommended to start oral magnesium oxide 400 mg twice a day.    On 10/10/2022 persistent low magnesium 1.6.  Discussed with patient, will increase oral magnesium approximately 200 mg twice daily.  At the #3 hypomagnesemia added this 14 2023 magnesium is 1.8, marginally normalized.  Patient reports he been taking magnesium 400 mg twice a day.  And I will continue that.  On 04/10/2023, magnesium is 1.8, marginally normalized. Patient reports he has been taking magnesium 400 mg twice a day and he will continue that.  10/09/2023, patient reports continue oral magnesium 400 mg twice a day. Laboratory showed marginal normal magnesium 1.8.      4. Chronic renal insufficiency, stage 3. Patient reports he is waiting to see his nephrologist, Dr. Fidel Good. He prefers we obtaining laboratory studies also to avoid repeat blood draw next week. We will check magnesium, phosphorus in conjunction with CMP today.   Patient has much improved hemoglobin on 10/10/2022.  We will decrease oral iron supplement.  Labs today on 10/09/2023 reported creatinine 1.21 and BUN 24      PLAN:  Continue oral iron twice a day.  Continue oral magnesium 400 mg twice a day.  We will arrange patient to come back for reevaluation in 12 months, we will check CBC, CMP, and iron studies.      I discussed with the patient about laboratory results and further management plan.  Patient voiced understanding and agreeable.           Rukhsana Wright MD PhD       CC:   MD Fidel Galindo MD      Transcribed from ambient dictation for Rukhsana Wright MD PhD by Tammy Adhikari.  10/09/23   14:33 EDT    Patient or patient  representative verbalized consent to the visit recording.  I have personally performed the services described in this document as transcribed by the above individual, and it is both accurate and complete.      Rukhsana Wright MD PhD

## 2023-10-19 PROBLEM — E83.42 HYPOMAGNESEMIA: Status: ACTIVE | Noted: 2023-10-19

## 2023-11-27 ENCOUNTER — OFFICE VISIT (OUTPATIENT)
Dept: CARDIOLOGY | Facility: CLINIC | Age: 68
End: 2023-11-27
Payer: COMMERCIAL

## 2023-11-27 VITALS
BODY MASS INDEX: 31.78 KG/M2 | HEIGHT: 70 IN | SYSTOLIC BLOOD PRESSURE: 128 MMHG | DIASTOLIC BLOOD PRESSURE: 72 MMHG | WEIGHT: 222 LBS | HEART RATE: 86 BPM

## 2023-11-27 DIAGNOSIS — E78.5 HYPERLIPIDEMIA, UNSPECIFIED HYPERLIPIDEMIA TYPE: ICD-10-CM

## 2023-11-27 DIAGNOSIS — I25.10 CORONARY ARTERY DISEASE INVOLVING NATIVE CORONARY ARTERY OF NATIVE HEART WITHOUT ANGINA PECTORIS: Primary | ICD-10-CM

## 2023-11-27 DIAGNOSIS — I48.20 CHRONIC A-FIB: ICD-10-CM

## 2023-11-27 DIAGNOSIS — I10 ESSENTIAL HYPERTENSION: ICD-10-CM

## 2023-11-27 PROCEDURE — 99214 OFFICE O/P EST MOD 30 MIN: CPT | Performed by: NURSE PRACTITIONER

## 2023-11-27 PROCEDURE — 93000 ELECTROCARDIOGRAM COMPLETE: CPT | Performed by: NURSE PRACTITIONER

## 2023-11-27 NOTE — PROGRESS NOTES
Date of Office Visit: 2023  Encounter Provider: BEATRICE Bray  Place of Service: [facility]  Patient Name: Frank Patel  :1955    Chief Complaint   Patient presents with    Coronary Artery Disease   :     HPI: Frank Patel is a 68 y.o. male patient of Dr. Gomes's with coronary artery disease and atrial fibrillation.  In 2019, he had an inferolateral and anterior STEMI and underwent drug-eluting stent placement to the proximal RCA.  At the time, his EF was 36 to 40%.  Later that year, he had normalization of his LV function.  He has not been anticoagulated due to GI bleeding.    He was last seen in the office by Dr. Gomes in 2022 at which time he was overall doing well.  No changes were made to his regimen, and he was advised to follow-up in 1 year.    He has been doing well.  He denies any chest pain, shortness of breath, palpitations, edema, dizziness, syncope, bleeding difficulties or melena.  His activity is limited by his back pain.    Past Medical History:   Diagnosis Date    Acute MI, inferoposterior wall     Atrial fibrillation     BPH (benign prostatic hyperplasia)     DDD (degenerative disc disease), lumbar     Diabetes     Elevated cholesterol     GERD (gastroesophageal reflux disease)     Head trauma     Hemorrhoids     History of shingles     History of snoring     HTN (hypertension)     Hyperlipidemia     Hypogonadism in male     Kidney stone     Muscle spasm     Ventricular fibrillation        Past Surgical History:   Procedure Laterality Date    APPENDECTOMY      CARDIAC CATHETERIZATION N/A 2019    Procedure: Left Heart Cath;  Surgeon: Rod Gomes MD;  Location:  [facility];  Service: Cardiology    CARDIAC CATHETERIZATION N/A 2019    Procedure: Stent ROBY coronary;  Surgeon: Rod Gomes MD;  Location: [facility] CATH INVASIVE LOCATION;  Service: Cardiology    CARDIAC  CATHETERIZATION N/A 2019    Procedure: Coronary angiography;  Surgeon: Rod Gomes MD;  Location:  [facility];  Service: Cardiology    CARDIAC CATHETERIZATION N/A 2019    Procedure: Left ventriculography;  Surgeon: Rod Gomes MD;  Location: [facility] CATH INVASIVE LOCATION;  Service: Cardiology    COLONOSCOPY      COLONOSCOPY W/ POLYPECTOMY N/A 2023    Procedure: COLONOSCOPY INTO CECUM WITH POLYPECTOMY (COLD SNARE);  Surgeon: Juan Ramon Munroe MD;  [facility];  Service: Gastroenterology;  Laterality: N/A;  PRE: ANEMIA  POST: POLYP, HEMORRHOIDS    ENDOSCOPY N/A 2023    Procedure: ESOPHAGOGASTRODUODENOSCOPY WITH BIOPSIES;  Surgeon: Juan Ramon Munroe MD;  Location: [facility] ENDOSCOPY;  Service: Gastroenterology;  Laterality: N/A;  PRE: ANEMIA  POST: GASTRITIS    FEMUR FRACTURE SURGERY Right 1990    HAND SURGERY Right     wrist    ORIF FINGER / THUMB FRACTURE Right     SPLENECTOMY  2007    TONSILLECTOMY         Social History     Socioeconomic History    Marital status:      Spouse name: Elvira   Tobacco Use    Smoking status: Former     Packs/day: 1.5     Types: Cigarettes     Quit date: 1999     Years since quittin.2    Smokeless tobacco: Never    Tobacco comments:     CAFFEINE USE: 1-2 16OZ COKES DAILY   Vaping Use    Vaping Use: Never used   Substance and Sexual Activity    Alcohol use: Yes     Alcohol/week: 3.0 standard drinks of alcohol     Types: 3 Cans of beer per week     Comment: rarely    Drug use: Never    Sexual activity: Defer       Family History   Problem Relation Age of Onset    Hyperlipidemia Mother     Hypertension Mother     Heart disease Mother     Diabetes Mother     Heart failure Mother     Heart attack Mother        Review of Systems   Constitutional: Negative.   Cardiovascular: Negative.  Negative for chest pain, dyspnea on exertion, leg swelling, orthopnea, paroxysmal nocturnal dyspnea and syncope.  "  Respiratory: Negative.     Hematologic/Lymphatic: Negative for bleeding problem.   Musculoskeletal:  Positive for back pain. Negative for falls.   Gastrointestinal:  Negative for melena.   Neurological:  Negative for dizziness and light-headedness.       Allergies   Allergen Reactions    Topiramate Other (See Comments)     irritability         Current Outpatient Medications:     atorvastatin (LIPITOR) 40 MG tablet, TAKE 1 TABLET BY MOUTH AT  NIGHT, Disp: 90 tablet, Rfl: 3    baclofen (LIORESAL) 10 MG tablet, Take 1 tablet by mouth 3 (Three) Times a Day., Disp: , Rfl:     clopidogrel (PLAVIX) 75 MG tablet, TAKE 1 TABLET BY MOUTH  DAILY, Disp: 90 tablet, Rfl: 3    ferrous sulfate 325 (65 FE) MG tablet, Take 1 tablet by mouth 2 (Two) Times a Day With Meals., Disp: 180 tablet, Rfl: 1    fluticasone (FLONASE) 50 MCG/ACT nasal spray, 2 sprays into the nostril(s) as directed by provider Daily As Needed for Rhinitis., Disp: , Rfl:     gabapentin (NEURONTIN) 300 MG capsule, , Disp: , Rfl:     hydroCHLOROthiazide (HYDRODIURIL) 25 MG tablet, Take 1 tablet by mouth Daily., Disp: , Rfl:     HYDROcodone-acetaminophen (NORCO)  MG per tablet, Take 1 tablet by mouth Every 6 (Six) Hours As Needed for Moderate Pain., Disp: , Rfl:     lisinopril (PRINIVIL,ZESTRIL) 5 MG tablet, Take 1 tablet by mouth Daily., Disp: , Rfl:     magnesium oxide (MAG-OX) 400 MG tablet, Take 2 tablets by mouth 2 (Two) Times a Day., Disp: 360 tablet, Rfl: 1    metoprolol succinate XL (TOPROL-XL) 25 MG 24 hr tablet, TAKE ONE AND ONE-HALF TABLETS DAILY, Disp: 135 tablet, Rfl: 2    pantoprazole (PROTONIX) 40 MG EC tablet, Take 1 tablet by mouth Daily., Disp: , Rfl:     tamsulosin (FLOMAX) 0.4 MG capsule 24 hr capsule, Take 1 capsule by mouth Every Night., Disp: , Rfl:       Objective:     Vitals:    11/27/23 1323   BP: 128/72   Pulse: 86   Weight: 101 kg (222 lb)   Height: 177.8 cm (70\")     Body mass index is 31.85 kg/m².    PHYSICAL EXAM:    Neck:      " Vascular: No JVD.   Pulmonary:      Effort: Pulmonary effort is normal.      Breath sounds: Normal breath sounds.   Cardiovascular:      Normal rate. Regular rhythm.      Murmurs: There is no murmur.      No gallop.  No click. No rub.   Pulses:     Intact distal pulses.           ECG 12 Lead    Date/Time: 11/27/2023 1:28 PM  Performed by: Justine Wallace APRN    Authorized by: Justine Wallace APRN  Comparison: compared with previous ECG from 10/21/2022  Similar to previous ECG  Rhythm: atrial fibrillation  Rate: normal  BPM: 86  Conduction: non-specific intraventricular conduction delay  Other findings: non-specific ST-T wave changes            Assessment:       Diagnosis Plan   1. Coronary artery disease involving native coronary artery of native heart without angina pectoris  ECG 12 Lead      2. Chronic a-fib          Orders Placed This Encounter   Procedures    ECG 12 Lead     This order was created via procedure documentation     Order Specific Question:   Release to patient     Answer:   Routine Release [[id number]]          Plan:       1.  Coronary artery disease.  He denies any symptoms of angina.  Continue clopidogrel and atorvastatin.      2.  Atrial fibrillation.  He is rate controlled with metoprolol.  He is not anticoagulated due to a history of GI bleeding.      3.  Hypertension.  His blood pressure is stable.  Continue current regimen.      4.  Hyperlipidemia.  Lipid panel from April demonstrated an LDL of 47 and HDL of 29.  Continue atorvastatin.      I think he is doing well.  I am not recommending any changes, and he will follow-up with Dr. Gomes in 1 year.      As always, it has been a pleasure to participate in your patient's care.      Sincerely,         BEATRICE Jaffe

## 2024-02-15 RX ORDER — ATORVASTATIN CALCIUM 40 MG/1
TABLET, FILM COATED ORAL
Qty: 90 TABLET | Refills: 3 | Status: SHIPPED | OUTPATIENT
Start: 2024-02-15

## 2024-02-15 RX ORDER — CLOPIDOGREL BISULFATE 75 MG/1
75 TABLET ORAL DAILY
Qty: 90 TABLET | Refills: 1 | Status: SHIPPED | OUTPATIENT
Start: 2024-02-15

## 2024-07-18 RX ORDER — CLOPIDOGREL BISULFATE 75 MG/1
75 TABLET ORAL DAILY
Qty: 90 TABLET | Refills: 3 | Status: SHIPPED | OUTPATIENT
Start: 2024-07-18

## 2024-10-25 DIAGNOSIS — D50.9 IRON DEFICIENCY ANEMIA, UNSPECIFIED IRON DEFICIENCY ANEMIA TYPE: Primary | ICD-10-CM

## 2024-10-25 DIAGNOSIS — D72.829 LEUKOCYTOSIS, UNSPECIFIED TYPE: ICD-10-CM

## 2024-10-25 NOTE — PROGRESS NOTES
.     REASON FOR FOLLOWUP:     * Chronic leukocytosis.   *Iron deficiency anemia, started on oral iron 8/1/2022.  *Hypomagnesemia.  Started on oral magnesium 8/1/2022.    HISTORY OF PRESENT ILLNESS:    The patient is a 69 y.o. year old male with hypertension, hyperlipidemia, diabetes, coronary artery disease, atrial fibrillation.  Returns today for annual lab review and follow up.  Continues on oral iron once daily.  He is tolerating oral iron well without any difficulty.  He does continue with intermittent bleeding hemorrhoids, typically occurs after he is constipated.  Last had hemorrhoids about 1-1/2 weeks ago, bleeding is very light and typically only last for about a day.  He is otherwise without signs or symptoms of bleeding.  He denies dizziness, lightheadedness, shortness of breath.  Energy level remains stable.        Past Medical History:   Diagnosis Date    Acute MI, inferoposterior wall     Atrial fibrillation     BPH (benign prostatic hyperplasia)     DDD (degenerative disc disease), lumbar     Diabetes     Elevated cholesterol     GERD (gastroesophageal reflux disease)     Head trauma 2007    Hemorrhoids     History of shingles 2012    History of snoring     HTN (hypertension)     Hyperlipidemia     Hypogonadism in male     Kidney stone     Muscle spasm     Ventricular fibrillation      Past Surgical History:   Procedure Laterality Date    APPENDECTOMY      CARDIAC CATHETERIZATION N/A 08/18/2019    Procedure: Left Heart Cath;  Surgeon: Rod Gomes MD;  Location: [facility] CATH INVASIVE LOCATION;  Service: Cardiology    CARDIAC CATHETERIZATION N/A 08/18/2019    Procedure: Stent ROBY coronary;  Surgeon: Rod Gomes MD;  Location: [facility] CATH INVASIVE LOCATION;  Service: Cardiology    CARDIAC CATHETERIZATION N/A 08/18/2019    Procedure: Coronary angiography;  Surgeon: Rod Gomes MD;  Location: [facility] CATH INVASIVE LOCATION;  Service: Cardiology    CARDIAC CATHETERIZATION N/A 08/18/2019     Procedure: Left ventriculography;  Surgeon: Rod Gomes MD;  Location: [facility] CATH INVASIVE LOCATION;  Service: Cardiology    COLONOSCOPY  2010    COLONOSCOPY W/ POLYPECTOMY N/A 4/18/2023    Procedure: COLONOSCOPY INTO CECUM WITH POLYPECTOMY (COLD SNARE);  Surgeon: Juan Ramon Munroe MD;  Location: [facility] ENDOSCOPY;  Service: Gastroenterology;  Laterality: N/A;  PRE: ANEMIA  POST: POLYP, HEMORRHOIDS    ENDOSCOPY N/A 4/18/2023    Procedure: ESOPHAGOGASTRODUODENOSCOPY WITH BIOPSIES;  Surgeon: Juan Ramon Munroe MD;  Location: [facility] ENDOSCOPY;  Service: Gastroenterology;  Laterality: N/A;  PRE: ANEMIA  POST: GASTRITIS    FEMUR FRACTURE SURGERY Right 1990    HAND SURGERY Right     wrist    ORIF FINGER / THUMB FRACTURE Right     SPLENECTOMY  2007    TONSILLECTOMY     Tonsillectomy 1962 in [place].  Appendectomy in 1977 in [place].  Right leg fracture and ORIF in 1989 in [place].  Splenectomy in June 2007 in [facility]  Cardiac catheterization with 2 stents placement August 2019 at [facility] Dr. Rod Gomes.  Risk for surgery in September 2021 at [facility] Dr. Moncho Velarde.  Colonoscopy 2010.      HEMATOLOGY HISTORY: The patient is a 67 y.o. year old male with hypertension, hyperlipidemia, diabetes, coronary artery disease, atrial fibrillation, who presented on 8/1/2022 for initial evaluation because of chronic leukocytosis.     Patient reports he has problem with leukocytes for many years and previously was seen by Dr. Ugo Nugent at [facility] and also had bone marrow aspiration and biopsy. The patient was told having some abnormalities with his blood, however, no need for treatment. He was followed by Dr. Nugent for a few times and then stopped going. I searched medical records in EMR system in [facility] and his last visit with Dr. Ugo Nugent was on 09/17/2017. According to the notes the patient had bone marrow aspiration and biopsy.      Patient originally was seen by Dr. Nugent on 02/15/2017 for leukocytosis. His peripheral blood flow cytometry study reported monoclonal T cell population, clonal T-LGL. Subsequently the patient had bone marrow aspiration and biopsy on 03/18/2017 with no obvious increase of blasts and also no increase of mast cell infiltrates. No evidence for metastatic tumor, granulomata or malignant lymphoma. No evidence for T-LGL leukemia. There was certainly maturation abnormalities noted in the megakaryocytic and erythroid lineage with less than 5% blasts. No excessive monoclonal T cell population. Chromosome study was normal male karyotype. FISH panel was negative for rearrangement of PDGFRB, FGFR1 and negative for deletion of 4q12. Dr. Nugent recommended annual followup with flow cytometry study.    I reviewed laboratory results in the [facility] EMR system. CBC study from 02/13/2012 reported WBC 11,740 including neutrophils 5440, lymphocytes 4260, monocytes 860, eosinophils 910 and basophils 230. Had hemoglobin 16.0, hematocrit 48.1, and platelets 477,000. On 10/19/2012, patient had WBC 11,300 including ANC 6800, lymphocytes 4200, monocytes 400, normal platelets 375,000 and hemoglobin 16.2. Lab study on 04/16/2013 reported hemoglobin 16.0, platelets 367,000, WBC 10,900 including granulocytes 5600, lymphocytes 4500, monocytes 800.     On 02/15/2017 when he was seen by Dr. Ugo Nugent, laboratory study reported total WBC 13,730 including ANC 5360, lymphocytes 5230, monocytes 1380, eosinophils 1670 and basophils 90. Hemoglobin was 15.5, MCV was 91.5, and platelets 481,000. Normal C-reactive protein 0.8 mg/dL. Protein electrophoresis reported normal SPEP without M spike. Protein immunofixation was also negative. Had serum IgG 1116, IgA 370, decreased IgM 24 mg/dL. Peripheral blood flow cytometry study reported clonal T cell population in CD3 and CD8, positive T cells; also positive for CD2, CD11C, and CD5 (dim), CD7,  CD56 (dim), CD45, CD57 (bright), CD26 (partial) and TCR alpha/beta.    This patient subsequently had bone marrow aspiration and biopsy on 03/13/2017 at [facility]. Chromosomal analysis reported normal male karyotype. FISH study was negative for deletion of 4q12 including CHIC2, and negative for fusion of FIP1L1 and PDGFRA genes. Also negative for rearrangement of PDGFRB, FGFR1. The bone marrow biopsy reported variably cellular bone marrow 30-40% with trilineage hematopoiesis. Decreased iron stores. The flow cytometry study of the bone marrow aspiration, however, did not show the clonal T-LGL population. No evidence for T-LGL leukemia or malignant lymphoma.     Lab study on 03/12/2018 reported WBC 12,400 with granulocytes 6500, lymphocytes 5300, monocytes 600 without eosinophils population. Hemoglobin was 16.7, MCV 90.6, platelets 475,000.     CBC on 03/11/2019 reported WBC 12,470, granulocytes 4740, lymphocytes 2540, monocytes 1770 and eosinophils 1870, basophil 110. Atypical lymphocytes was 1430. Hemoglobin was 15.9, MCV 91.0, platelets 460,000.     CBC study on 07/01/2020 reported WBC 15,420, ANC 8110, lymphocytes 5130, monocytes 820, eosinophils 1220, basophils 140. Hemoglobin was 15.2 and platelets 415,000.     Laboratory study on 02/22/2021 reported WBC 15,740, ANC 6570, lymphocytes 2160, monocytes 360, eosinophils 2870, basophils 230, atypical lymphocytes 1550, platelets 451,000 and hemoglobin 13.7 with MCV 95.6, MCHC 31.7. His B12 level was 385 pg/mL. Chemistry lab reported glucose 121, otherwise unremarkable CMP including normal liver function panel.    Laboratory study on 06/21/2021 reported worsening anemia, hemoglobin 13.1, MCV 94.7, platelets 447,000, WBC 14,530 including ANC 6310, lymphocytes 4110, monocytes 770, eosinophils 1800, basophils 260, atypical lymphocytes 1280 and platelets 447,000. Vitamin B12 level was 435 pg/mL. No iron studies, no folate.     Laboratory study  on 04/18/2022 reported hemoglobin 13.4, MCV 93.9, platelets 456,000, WBC 15,940 including ANC 7140 and lymphocytes 6220, monocytes 1090, eosinophils 1240, basophils 190, platelets 456,000. Hemoglobin 13.4, MCV 93.9, MCHC 31.1. CMP on the same day reported creatinine 1.12, glucose 111, calcium 10.3, total protein 7.5, albumin 4.8, globulin 2.6, otherwise unremarkable.     Laboratory study today in our clinic on 08/01/2022 reported further worsening anemia with hemoglobin 11.5, MCV 93.1, normal platelets 399,000, WBC 13,730 including neutrophils 4790, lymphocytes 5320, monocytes 1370, eosinophils 1910 and basophils 240, immature granulocytes 100.     I reviewed his peripheral blood smear on 08/01/2022. There are significant anisocytosis of RBC, jose-shaped RBC, spike cells. Occasional schistocytes. No target cells. No clustering of platelets. Morphology of WBC shows increased eosinophils and lymphocytes, however, no blasts. No increased metamyelocytes and promyelocytes.     I saw him originally on 8/1/2022 for initial evaluation because of chronic leukocytosis.  Laboratory studies reported severe iron deficiency with a ferritin 9.5 ng/mL, iron saturation 8% with free iron 43 TIBC 521.  Patient had a normal folate and mediocre B12 level.  We will start the patient on ferrous sulfate 325 mg 3 times a day.    She also was found to having hypomagnesemia 1.6 and we started patient on oral magnesium 400 mg twice a day.    His stool samples submitted on 8/23/2022 was tested negative for occult blood.      Laboratory study today on 10/10/2022 reported much improved ferritin 55.6 ng/mL, iron saturation 17% with free iron 68 and iron saturation 393 together with improved hemoglobin 13.3.  However he has persistent mild leukocytosis WBC 11,640, including ANC 5300 lymphocytes 4150, monocytes 1100 and eosinophil 950.    Laboratory study on 04/10/2023 reported further improved hemoglobin 14.1, MCV 94.9. Patient has normal platelets  412,000. He also has worsening leukocytosis, WBC 15,980 including neutrophils 8,970, lymphocytes 4,780, monocytes 1,170, eosinophil 840, and immature granulocytes 70. Iron study is pending, so his magnesium level.          MEDICATIONS    Current Outpatient Medications:     atorvastatin (LIPITOR) 40 MG tablet, TAKE 1 TABLET BY MOUTH AT NIGHT, Disp: 90 tablet, Rfl: 3    baclofen (LIORESAL) 10 MG tablet, Take 1 tablet by mouth 3 (Three) Times a Day., Disp: , Rfl:     clopidogrel (PLAVIX) 75 MG tablet, TAKE 1 TABLET BY MOUTH DAILY, Disp: 90 tablet, Rfl: 3    ferrous sulfate 325 (65 FE) MG tablet, Take 1 tablet by mouth 2 (Two) Times a Day With Meals., Disp: 180 tablet, Rfl: 1    fluticasone (FLONASE) 50 MCG/ACT nasal spray, Administer 2 sprays into the nostril(s) as directed by provider Daily As Needed for Rhinitis., Disp: , Rfl:     gabapentin (NEURONTIN) 300 MG capsule, , Disp: , Rfl:     hydroCHLOROthiazide (HYDRODIURIL) 25 MG tablet, Take 1 tablet by mouth Daily., Disp: , Rfl:     HYDROcodone-acetaminophen (NORCO)  MG per tablet, Take 1 tablet by mouth Every 6 (Six) Hours As Needed for Moderate Pain., Disp: , Rfl:     lansoprazole (PREVACID) 30 MG capsule, Take 1 capsule by mouth Daily., Disp: , Rfl:     lisinopril (PRINIVIL,ZESTRIL) 5 MG tablet, Take 1 tablet by mouth Daily., Disp: , Rfl:     magnesium oxide (MAG-OX) 400 MG tablet, Take 2 tablets by mouth 2 (Two) Times a Day., Disp: 360 tablet, Rfl: 1    metoprolol succinate XL (TOPROL-XL) 25 MG 24 hr tablet, TAKE ONE AND ONE-HALF TABLETS DAILY, Disp: 135 tablet, Rfl: 2    tamsulosin (FLOMAX) 0.4 MG capsule 24 hr capsule, Take 1 capsule by mouth Every Night., Disp: , Rfl:     ALLERGIES:     Allergies   Allergen Reactions    Topiramate Other (See Comments)     irritability   Mild allergy to milk and dairy product.    SOCIAL HISTORY:       Social History     Socioeconomic History    Marital status:      Spouse name: Elvira   Tobacco Use    Smoking status:  "Former     Current packs/day: 0.00     Types: Cigarettes     Quit date: 1999     Years since quittin.2    Smokeless tobacco: Never    Tobacco comments:     CAFFEINE USE: 1-2 16OZ COKES DAILY   Vaping Use    Vaping status: Never Used   Substance and Sexual Activity    Alcohol use: Yes     Alcohol/week: 3.0 standard drinks of alcohol     Types: 3 Cans of beer per week     Comment: rarely    Drug use: Never    Sexual activity: Defer   Quit smoking , smoked 1-1/2 pack a day.  No vaping.      FAMILY HISTORY:  Family History   Problem Relation Age of Onset    Hyperlipidemia Mother     Hypertension Mother     Heart disease Mother     Diabetes Mother     Heart failure Mother     Heart attack Mother    Mother had a hypertension, hyperlipidemia, heart attack, diabetes and obesity.  Aunt has diabetes.     REVIEW OF SYSTEMS:  See HPI.           Vitals:    10/28/24 1542   BP: 127/78   Pulse: 74   Resp: 16   Temp: 97.9 °F (36.6 °C)   TempSrc: Oral   SpO2: 97%   Weight: 105 kg (231 lb 1.6 oz)   Height: 177.8 cm (70\")   PainSc: 0-No pain           10/28/2024     3:45 PM   Current Status   ECOG score 0      PHYSICAL EXAM:   GENERAL:  Well-developed, well-nourished in no acute distress.  Orientated to time place and the people.  SKIN:  Warm, dry without rashes, purpura or petechiae.  HEENT:  Normocephalic.   LYMPHATICS:  No cervical, supraclavicular adenopathy.  CHEST: Normal respiratory effort.  Lungs clear to auscultation. Good airflow.  CARDIAC:  Regular rate and rhythm. Normal S1,S2.  ABDOMEN:  Soft, nontender with no organomegaly or masses.  Bowel sounds normal.  EXTREMITIES:  No lower extremity edema.      RECENT LABS:  WBC   Date Value Ref Range Status   2024 12.08 (H) 4.5 - 11.0 10*3/uL Final     RBC   Date Value Ref Range Status   2024 5.09 4.5 - 5.9 10*6/uL Final     Hemoglobin   Date Value Ref Range Status   2024 15.4 13.5 - 17.5 g/dL Final     Hematocrit   Date Value Ref Range Status "   08/05/2024 48.9 41.0 - 53.0 % Final     MCV   Date Value Ref Range Status   08/05/2024 96.1 80.0 - 100.0 fL Final     MCH   Date Value Ref Range Status   08/05/2024 30.3 26.0 - 34.0 pg Final     MCHC   Date Value Ref Range Status   08/05/2024 31.5 31.0 - 37.0 g/dL Final     RDW   Date Value Ref Range Status   08/05/2024 14.1 12.0 - 16.8 % Final     RDW-SD   Date Value Ref Range Status   10/09/2023 49.4 37.0 - 54.0 fl Final     MPV   Date Value Ref Range Status   08/05/2024 11.7 8.4 - 12.4 fL Final     Platelets   Date Value Ref Range Status   08/05/2024 356 140 - 440 10*3/uL Final     Neutrophil Rel %   Date Value Ref Range Status   08/05/2024 35.7 (L) 45 - 80 % Final     Lymphocyte Rel %   Date Value Ref Range Status   08/05/2024 42.7 15 - 50 % Final     Monocyte Rel %   Date Value Ref Range Status   08/05/2024 9.5 0 - 15 % Final     Eosinophil %   Date Value Ref Range Status   08/05/2024 10.3 (H) 0 - 7 % Final     Basophil Rel %   Date Value Ref Range Status   08/05/2024 1.4 0 - 2 % Final     Immature Grans %   Date Value Ref Range Status   08/05/2024 0.4 0.0 - 1.0 % Final     Neutrophils Absolute   Date Value Ref Range Status   08/05/2024 4.30 2.0 - 8.8 10*3/uL Final     Lymphocytes Absolute   Date Value Ref Range Status   08/05/2024 5.16 0.7 - 5.5 10*3/uL Final     Monocytes Absolute   Date Value Ref Range Status   08/05/2024 1.15 0.0 - 1.7 10*3/uL Final     Eosinophils Absolute   Date Value Ref Range Status   08/05/2024 1.25 (H) 0.0 - 0.8 10*3/uL Final     Basophils Absolute   Date Value Ref Range Status   08/05/2024 0.17 0.0 - 0.2 10*3/uL Final     Immature Grans, Absolute   Date Value Ref Range Status   08/05/2024 0.05 0.00 - 0.10 10*3/uL Final     nRBC   Date Value Ref Range Status   08/05/2024 0 0 /100(WBC) Final   10/09/2023 0.1 0.0 - 0.2 /100 WBC Final     Lab Results   Component Value Date    GLUCOSE 128 (H) 10/09/2023    CALCIUM 9.6 10/09/2023     10/09/2023    K 4.5 10/09/2023    CO2 27.3  10/09/2023     10/09/2023    BUN 24 (H) 10/09/2023    CREATININE 1.21 10/09/2023    EGFR 66.0 10/09/2023    BCR 19.8 10/09/2023    ANIONGAP 12.7 10/09/2023      Latest Reference Range & Units 10/09/23 12:55   Magnesium 1.6 - 2.4 mg/dL 1.8       Lab Results   Component Value Date    IRON 58 (L) 10/09/2023    TIBC 356 10/09/2023    FERRITIN 142.0 08/05/2024   Iron saturation 16% on 10/9/2023.    Lab Results   Component Value Date    WOMJSFNQ06 427 08/05/2024     Lab Results   Component Value Date    FOLATE 8.77 08/01/2022           Assessment & Plan     ASSESSMENT:    1. Leukocytosis with eosinophilia, monocytosis, and lymphocytosis.   This patient has problem since at least 2013 when he was seen by Dr. Ugo Nugent at [facility] in 2017 with both peripheral blood flow cytometry study in 02/2017 and subsequently bone marrow aspiration and biopsy in 03/2013 which reported no evidence for leukemia or lymphoma in the bone marrow despite previous peripheral blood showed monoclonal T cell LGL.   Bone marrow examination in March 2017, FISH study was negative for deletion of 4q12 including CHIC2, and negative for fusion of FIP1L1 and PDGFRA genes. Also negative for rearrangement of PDGFRB, FGFR1.  Reviewed his laboratory studies, his WBC, eosinophilia, monocytosis, lymphocytosis are relatively stable with fluctuation in numbers.   Patient reports on 8/1/2022 he is feeling relatively well. No fever, sweating or chills. He has no significant weight loss. I think we can monitor for now. I will focus on the anemia workup first. If there is no explainable reason for his anemia, we likely will need to repeat bone marrow aspiration and biopsy.   I do think this patient likely has myeloproliferative disorder.  Laboratory study today on 04/10/2023 reported WBC 15,980 including ANC 1870, lymphocytes 4780, monocytes 1170 eosinophils 840.  Patient reports stable clinical condition.  Continue to monitor.  10/09/2023, patient  has overall improved WBC at 13,390, however, slightly worsening eosinophil 1980, normalized and further improved neutrophils 5330, improved lymphocytes 4340, and stable monocytes 1320. Patient reports no weight loss and no other symptoms.  10/28/2024: WBC 17.33, absolute eosinophils 1350, neutrophils 7740.  No weight loss or other symptoms.      2. Iron deficiency anemia.   This is relatively new for the past 2 years. His hemoglobin has been gradually trending down.   He reports no melena or hematochezia. He does complain of fatigue.   I reviewed his peripheral blood smear on 8/1/2022.  I suspect that he may have iron deficiency with jose-shaped RBC. I recommended to obtain laboratory studies first with serum ferritin, iron profile, vitamin B12, and folate. We will also check CMP and LDH.   8/1/2022 lab studies reported iron deficiency with ferritin of 105 iron saturation 8% free iron 43 TIBC 400, B12 level 114 and folate level 8.7.  Patient was started on oral ferrous sulfate 325 mg.    On 10/10/2022 patient reports excellent tolerance to oral iron treatment.  Iron study also showed much improved ferritin 55.6 and low normal iron saturation 17%.  He also has much improved hemoglobin 13.3.  On 8/1/2022 laboratory study reported iron deficiency with free iron 43, iron saturation 8%, TIBC 521 and ferritin 9.5 ng/mL. He had normal folate 8.7 ng/mL and low normal B12 level at 414 pg/mL. Also reported low magnesium at 1.6, creatinine 1.39, potassium 5.3, normal sodium, and calcium 9.2, and normal LDH at 187. This patient clearly has iron deficiency.  No evidence for hemolysis.   Laboratory study today on 04/10/2023 reported further improved hemoglobin 14.1. His iron study also came back with ferritin 101.5, iron saturation 21%, free iron 86, and TIBC of 406. Patient reports he mostly takes oral iron only once a day, occasionally twice a day. I think with further improved hemoglobin and iron numbers, once a day would be  good enough.  Lab study today on 10/09/2023 reported normal hemoglobin 14.2, MCV 95.8, ferritin 112 and free iron 58, TIBC 356, iron saturation 16% improved. Patient reports continues taking oral iron twice a day.  10/28/2024: Needs oral iron once daily.  Hemoglobin 15.3, iron saturation 31%, ferritin 961.  Continue oral iron once daily.    3.  Hypomagnesemia.    Magnesium 1.6 today on 8/1/2022.  Etiology is not clear. Nevertheless I recommended to start oral magnesium oxide 400 mg twice a day.    On 10/10/2022 persistent low magnesium 1.6.  Discussed with patient, will increase oral magnesium approximately 200 mg twice daily.  At the #3 hypomagnesemia added this 14 2023 magnesium is 1.8, marginally normalized.  Patient reports he been taking magnesium 400 mg twice a day.  And I will continue that.  On 04/10/2023, magnesium is 1.8, marginally normalized. Patient reports he has been taking magnesium 400 mg twice a day and he will continue that.  10/09/2023, patient reports continue oral magnesium 400 mg twice a day. Laboratory showed marginal normal magnesium 1.8.      4. Chronic renal insufficiency, stage 3. Patient reports he is waiting to see his nephrologist, Dr. Fidel Good. He prefers we obtaining laboratory studies also to avoid repeat blood draw next week. We will check magnesium, phosphorus in conjunction with CMP today.   Patient has much improved hemoglobin on 10/10/2022.  We will decrease oral iron supplement.  Labs today on 10/09/2023 reported creatinine 1.21 and BUN 24      PLAN:   Continue oral iron once daily.  Continue oral magnesium 400 mg twice a day.  We will arrange patient to come back for reevaluation in 12 months, we will check CBC, CMP, and iron studies.       BEATRICE Barnes       CC:   MD Fidel Galindo MD

## 2024-10-28 ENCOUNTER — OFFICE VISIT (OUTPATIENT)
Dept: ONCOLOGY | Facility: CLINIC | Age: 69
End: 2024-10-28
Payer: MEDICARE

## 2024-10-28 ENCOUNTER — LAB (OUTPATIENT)
Dept: LAB | Facility: HOSPITAL | Age: 69
End: 2024-10-28
Payer: MEDICARE

## 2024-10-28 VITALS
RESPIRATION RATE: 16 BRPM | WEIGHT: 231.1 LBS | HEART RATE: 74 BPM | DIASTOLIC BLOOD PRESSURE: 78 MMHG | SYSTOLIC BLOOD PRESSURE: 127 MMHG | BODY MASS INDEX: 33.09 KG/M2 | OXYGEN SATURATION: 97 % | HEIGHT: 70 IN | TEMPERATURE: 97.9 F

## 2024-10-28 DIAGNOSIS — D50.9 IRON DEFICIENCY ANEMIA, UNSPECIFIED IRON DEFICIENCY ANEMIA TYPE: ICD-10-CM

## 2024-10-28 DIAGNOSIS — D72.829 LEUKOCYTOSIS, UNSPECIFIED TYPE: ICD-10-CM

## 2024-10-28 DIAGNOSIS — D50.9 IRON DEFICIENCY ANEMIA, UNSPECIFIED IRON DEFICIENCY ANEMIA TYPE: Primary | ICD-10-CM

## 2024-10-28 LAB
BASOPHILS # BLD AUTO: 0.19 10*3/MM3 (ref 0–0.2)
BASOPHILS NFR BLD AUTO: 1.1 % (ref 0–1.5)
DEPRECATED RDW RBC AUTO: 47.6 FL (ref 37–54)
EOSINOPHIL # BLD AUTO: 1.35 10*3/MM3 (ref 0–0.4)
EOSINOPHIL NFR BLD AUTO: 7.8 % (ref 0.3–6.2)
ERYTHROCYTE [DISTWIDTH] IN BLOOD BY AUTOMATED COUNT: 13.9 % (ref 12.3–15.4)
FERRITIN SERPL-MCNC: 261 NG/ML (ref 30–400)
HCT VFR BLD AUTO: 47.8 % (ref 37.5–51)
HGB BLD-MCNC: 15.3 G/DL (ref 13–17.7)
IMM GRANULOCYTES # BLD AUTO: 0.09 10*3/MM3 (ref 0–0.05)
IMM GRANULOCYTES NFR BLD AUTO: 0.5 % (ref 0–0.5)
IRON 24H UR-MRATE: 116 MCG/DL (ref 59–158)
IRON SATN MFR SERPL: 31 % (ref 20–50)
LYMPHOCYTES # BLD AUTO: 6.47 10*3/MM3 (ref 0.7–3.1)
LYMPHOCYTES NFR BLD AUTO: 37.3 % (ref 19.6–45.3)
MCH RBC QN AUTO: 30 PG (ref 26.6–33)
MCHC RBC AUTO-ENTMCNC: 32 G/DL (ref 31.5–35.7)
MCV RBC AUTO: 93.7 FL (ref 79–97)
MONOCYTES # BLD AUTO: 1.49 10*3/MM3 (ref 0.1–0.9)
MONOCYTES NFR BLD AUTO: 8.6 % (ref 5–12)
NEUTROPHILS NFR BLD AUTO: 44.7 % (ref 42.7–76)
NEUTROPHILS NFR BLD AUTO: 7.74 10*3/MM3 (ref 1.7–7)
NRBC BLD AUTO-RTO: 0 /100 WBC (ref 0–0.2)
PLATELET # BLD AUTO: 404 10*3/MM3 (ref 140–450)
PMV BLD AUTO: 10.9 FL (ref 6–12)
RBC # BLD AUTO: 5.1 10*6/MM3 (ref 4.14–5.8)
TIBC SERPL-MCNC: 378 MCG/DL (ref 298–536)
TRANSFERRIN SERPL-MCNC: 254 MG/DL (ref 200–360)
WBC NRBC COR # BLD AUTO: 17.33 10*3/MM3 (ref 3.4–10.8)

## 2024-10-28 PROCEDURE — 82728 ASSAY OF FERRITIN: CPT

## 2024-10-28 PROCEDURE — 99213 OFFICE O/P EST LOW 20 MIN: CPT | Performed by: NURSE PRACTITIONER

## 2024-10-28 PROCEDURE — 84466 ASSAY OF TRANSFERRIN: CPT

## 2024-10-28 PROCEDURE — 83540 ASSAY OF IRON: CPT

## 2024-10-28 PROCEDURE — 85025 COMPLETE CBC W/AUTO DIFF WBC: CPT

## 2024-10-28 PROCEDURE — 36415 COLL VENOUS BLD VENIPUNCTURE: CPT

## 2024-10-28 RX ORDER — LANSOPRAZOLE 30 MG/1
30 CAPSULE, DELAYED RELEASE ORAL DAILY
COMMUNITY
Start: 2024-06-04 | End: 2025-06-04

## 2024-12-02 RX ORDER — ATORVASTATIN CALCIUM 40 MG/1
TABLET, FILM COATED ORAL
Qty: 90 TABLET | Refills: 3 | Status: SHIPPED | OUTPATIENT
Start: 2024-12-02

## 2024-12-03 ENCOUNTER — OFFICE VISIT (OUTPATIENT)
Dept: CARDIOLOGY | Facility: CLINIC | Age: 69
End: 2024-12-03
Payer: MEDICARE

## 2024-12-03 VITALS
WEIGHT: 234 LBS | SYSTOLIC BLOOD PRESSURE: 146 MMHG | DIASTOLIC BLOOD PRESSURE: 80 MMHG | HEART RATE: 72 BPM | HEIGHT: 70 IN | BODY MASS INDEX: 33.5 KG/M2

## 2024-12-03 DIAGNOSIS — I10 ESSENTIAL HYPERTENSION: ICD-10-CM

## 2024-12-03 DIAGNOSIS — E78.5 HYPERLIPIDEMIA, UNSPECIFIED HYPERLIPIDEMIA TYPE: ICD-10-CM

## 2024-12-03 DIAGNOSIS — E11.59 TYPE 2 DIABETES MELLITUS WITH OTHER CIRCULATORY COMPLICATION, WITHOUT LONG-TERM CURRENT USE OF INSULIN: ICD-10-CM

## 2024-12-03 DIAGNOSIS — I21.19: Primary | ICD-10-CM

## 2024-12-03 DIAGNOSIS — Z95.5 S/P DRUG ELUTING CORONARY STENT PLACEMENT: ICD-10-CM

## 2024-12-03 DIAGNOSIS — I49.01 VENTRICULAR FIBRILLATION: ICD-10-CM

## 2024-12-03 DIAGNOSIS — I48.20 CHRONIC A-FIB: ICD-10-CM

## 2024-12-03 PROCEDURE — 3077F SYST BP >= 140 MM HG: CPT | Performed by: INTERNAL MEDICINE

## 2024-12-03 PROCEDURE — 99214 OFFICE O/P EST MOD 30 MIN: CPT | Performed by: INTERNAL MEDICINE

## 2024-12-03 PROCEDURE — 3079F DIAST BP 80-89 MM HG: CPT | Performed by: INTERNAL MEDICINE

## 2024-12-03 PROCEDURE — 93000 ELECTROCARDIOGRAM COMPLETE: CPT | Performed by: INTERNAL MEDICINE

## 2024-12-03 NOTE — PROGRESS NOTES
Date of Office Visit: 24  Encounter Provider: Rod Gomes MD  Place of Service: [facility] CARDIOLOGY  Patient Name: Frank Patel  :1955  [id number]    Chief Complaint   Patient presents with    Coronary Artery Disease    Follow-up     1 year   :     HPI: Frank Patel is a 69 y.o. male  In 2019 he had an inferior MI with VF arrest and complicated by severe no reflow very large RCA stented did not have a lot of other disease in his other coronaries LV function at the time of the MI was about 35%.  Follow-up echo showed LV function had normalized.  He also has chronic A. fib and we have not anticoagulated him because he has had some lower GI bleeding.      He is here for follow-up and has been doing okay no chest pain shortness of breath PND orthopnea edema certainly when he had his MI he had a lot of symptoms.  He has been having diabetes he recently got started on Jardiance but has not started it.  No other symptoms other than occasionally feels fatigued and he feels like he sweats easily    Past Medical History:   Diagnosis Date    Acute MI, inferoposterior wall     Atrial fibrillation     BPH (benign prostatic hyperplasia)     DDD (degenerative disc disease), lumbar     Diabetes     Elevated cholesterol     GERD (gastroesophageal reflux disease)     Head trauma     Hemorrhoids     History of shingles     History of snoring     HTN (hypertension)     Hyperlipidemia     Hypogonadism in male     Kidney stone     Muscle spasm     Ventricular fibrillation        Past Surgical History:   Procedure Laterality Date    APPENDECTOMY      CARDIAC CATHETERIZATION N/A 2019    Procedure: Left Heart Cath;  Surgeon: Rod Gomes MD;  Location: [facility] CATH INVASIVE LOCATION;  Service: Cardiology    CARDIAC CATHETERIZATION N/A 2019    Procedure: Stent ROBY coronary;  Surgeon: Rod Gomes MD;  Location: [facility] CATH INVASIVE LOCATION;  Service: Cardiology     CARDIAC CATHETERIZATION N/A 2019    Procedure: Coronary angiography;  Surgeon: Rod Gomes MD;  Location:  [facility] CATH INVASIVE LOCATION;  Service: Cardiology    CARDIAC CATHETERIZATION N/A 2019    Procedure: Left ventriculography;  Surgeon: Rod Gomes MD;  Location: [facility] CATH INVASIVE LOCATION;  Service: Cardiology    COLONOSCOPY      COLONOSCOPY W/ POLYPECTOMY N/A 2023    Procedure: COLONOSCOPY INTO CECUM WITH POLYPECTOMY (COLD SNARE);  Surgeon: Juan Ramon Munroe MD;  Location: [facility] ENDOSCOPY;  Service: Gastroenterology;  Laterality: N/A;  PRE: ANEMIA  POST: POLYP, HEMORRHOIDS    ENDOSCOPY N/A 2023    Procedure: ESOPHAGOGASTRODUODENOSCOPY WITH BIOPSIES;  Surgeon: Juan Ramon Munroe MD;  Location: [facility] ENDOSCOPY;  Service: Gastroenterology;  Laterality: N/A;  PRE: ANEMIA  POST: GASTRITIS    FEMUR FRACTURE SURGERY Right 1990    HAND SURGERY Right     wrist    ORIF FINGER / THUMB FRACTURE Right     SPLENECTOMY  2007    TONSILLECTOMY         Social History     Socioeconomic History    Marital status:      Spouse name: Elvira   Tobacco Use    Smoking status: Former     Current packs/day: 0.00     Types: Cigarettes     Quit date: 1999     Years since quittin.3     Passive exposure: Past    Smokeless tobacco: Never    Tobacco comments:     CAFFEINE USE: 1-2 16OZ COKES DAILY   Vaping Use    Vaping status: Never Used   Substance and Sexual Activity    Alcohol use: Yes     Alcohol/week: 3.0 standard drinks of alcohol     Types: 3 Cans of beer per week     Comment: rarely    Drug use: Never    Sexual activity: Defer       Family History   Problem Relation Age of Onset    Hyperlipidemia Mother     Hypertension Mother     Heart disease Mother     Diabetes Mother     Heart failure Mother     Heart attack Mother        Review of Systems   Constitutional: Negative for decreased appetite, fever, malaise/fatigue and weight loss.   HENT:  Negative for nosebleeds.     Eyes:  Negative for double vision.   Cardiovascular:  Negative for chest pain, claudication, cyanosis, dyspnea on exertion, irregular heartbeat, leg swelling, near-syncope, orthopnea, palpitations, paroxysmal nocturnal dyspnea and syncope.   Respiratory:  Negative for cough, hemoptysis and shortness of breath.    Hematologic/Lymphatic: Negative for bleeding problem.   Skin:  Negative for rash.   Musculoskeletal:  Negative for falls and myalgias.   Gastrointestinal:  Negative for hematochezia, jaundice, melena, nausea and vomiting.   Genitourinary:  Negative for hematuria.   Neurological:  Negative for dizziness and seizures.   Psychiatric/Behavioral:  Negative for altered mental status and memory loss.        Allergies   Allergen Reactions    Topiramate Other (See Comments)     irritability         Current Outpatient Medications:     atorvastatin (LIPITOR) 40 MG tablet, TAKE 1 TABLET BY MOUTH AT NIGHT, Disp: 90 tablet, Rfl: 3    baclofen (LIORESAL) 10 MG tablet, Take 1 tablet by mouth 3 (Three) Times a Day., Disp: , Rfl:     clopidogrel (PLAVIX) 75 MG tablet, TAKE 1 TABLET BY MOUTH DAILY, Disp: 90 tablet, Rfl: 3    empagliflozin (JARDIANCE) 25 MG tablet tablet, Take 1 tablet by mouth Daily., Disp: , Rfl:     ferrous sulfate 325 (65 FE) MG tablet, Take 1 tablet by mouth 2 (Two) Times a Day With Meals., Disp: 180 tablet, Rfl: 1    fluticasone (FLONASE) 50 MCG/ACT nasal spray, Administer 2 sprays into the nostril(s) as directed by provider Daily As Needed for Rhinitis., Disp: , Rfl:     gabapentin (NEURONTIN) 300 MG capsule, , Disp: , Rfl:     hydroCHLOROthiazide (HYDRODIURIL) 25 MG tablet, Take 1 tablet by mouth Daily., Disp: , Rfl:     HYDROcodone-acetaminophen (NORCO)  MG per tablet, Take 1 tablet by mouth Every 6 (Six) Hours As Needed for Moderate Pain., Disp: , Rfl:     lansoprazole (PREVACID) 30 MG capsule, Take 1 capsule by mouth Daily., Disp: , Rfl:     lisinopril (PRINIVIL,ZESTRIL) 5 MG tablet, Take 1  "tablet by mouth Daily., Disp: , Rfl:     magnesium oxide (MAG-OX) 400 MG tablet, Take 2 tablets by mouth 2 (Two) Times a Day., Disp: 360 tablet, Rfl: 1    metoprolol succinate XL (TOPROL-XL) 25 MG 24 hr tablet, TAKE ONE AND ONE-HALF TABLETS DAILY, Disp: 135 tablet, Rfl: 2    tamsulosin (FLOMAX) 0.4 MG capsule 24 hr capsule, Take 1 capsule by mouth Every Night., Disp: , Rfl:       Objective:     Vitals:    12/03/24 1339   BP: 146/80   Pulse: 72   Weight: 106 kg (234 lb)   Height: 177.8 cm (70\")     Body mass index is 33.58 kg/m².    Constitutional:       Appearance: Well-developed.   Eyes:      General: No scleral icterus.  HENT:      Head: Normocephalic.   Neck:      Thyroid: No thyromegaly.      Vascular: No JVD.      Lymphadenopathy: No cervical adenopathy.   Pulmonary:      Effort: Pulmonary effort is normal.      Breath sounds: Normal breath sounds. No wheezing. No rales.   Cardiovascular:      Normal rate. Irregularly irregular rhythm.      No gallop.    Edema:     Peripheral edema absent.   Abdominal:      Palpations: Abdomen is soft.      Tenderness: There is no abdominal tenderness.   Musculoskeletal: Normal range of motion. Skin:     General: Skin is warm and dry.      Findings: No rash.   Neurological:      Mental Status: Alert and oriented to person, place, and time.           ECG 12 Lead    Date/Time: 12/3/2024 2:00 PM  Performed by: Rod Gomes MD    Authorized by: Rod Gomes MD  Comparison: compared with previous ECG   Rhythm: atrial fibrillation    Clinical impression: abnormal EKG           Assessment:       Diagnosis Plan   1. Acute MI, inferoposterior wall        2. Ventricular fibrillation        3. Chronic a-fib        4. S/P drug eluting coronary stent placement        5. Essential hypertension        6. Hyperlipidemia, unspecified hyperlipidemia type        7. Type 2 diabetes mellitus with other circulatory complication, without long-term current use of insulin                 Plan: "       I think he is doing well right now it is important that he get that A1c down it is important that his weight not go up anymore he is limited with his exercise because of a bad back unfortunately.  He otherwise is doing okay I will just have him come back and see us in a year sooner if he has trouble    No follow-ups on file.     As always, it has been a pleasure to participate in your patient's care.      Sincerely,       Rod Gomes MD

## 2025-04-22 ENCOUNTER — OFFICE VISIT (OUTPATIENT)
Dept: CARDIOLOGY | Age: 70
End: 2025-04-22
Payer: MEDICARE

## 2025-04-22 VITALS
HEART RATE: 84 BPM | HEIGHT: 70 IN | BODY MASS INDEX: 33.21 KG/M2 | SYSTOLIC BLOOD PRESSURE: 124 MMHG | DIASTOLIC BLOOD PRESSURE: 76 MMHG | WEIGHT: 232 LBS

## 2025-04-22 DIAGNOSIS — I10 ESSENTIAL HYPERTENSION: ICD-10-CM

## 2025-04-22 DIAGNOSIS — I21.19: Primary | ICD-10-CM

## 2025-04-22 DIAGNOSIS — K64.8 INTERNAL HEMORRHOID: ICD-10-CM

## 2025-04-22 DIAGNOSIS — M51.360 DEGENERATION OF INTERVERTEBRAL DISC OF LUMBAR REGION WITH DISCOGENIC BACK PAIN: ICD-10-CM

## 2025-04-22 DIAGNOSIS — E78.5 HYPERLIPIDEMIA, UNSPECIFIED HYPERLIPIDEMIA TYPE: ICD-10-CM

## 2025-04-22 DIAGNOSIS — E66.09 CLASS 1 OBESITY DUE TO EXCESS CALORIES WITH SERIOUS COMORBIDITY AND BODY MASS INDEX (BMI) OF 33.0 TO 33.9 IN ADULT: ICD-10-CM

## 2025-04-22 DIAGNOSIS — Z95.5 S/P DRUG ELUTING CORONARY STENT PLACEMENT: ICD-10-CM

## 2025-04-22 DIAGNOSIS — E66.811 CLASS 1 OBESITY DUE TO EXCESS CALORIES WITH SERIOUS COMORBIDITY AND BODY MASS INDEX (BMI) OF 33.0 TO 33.9 IN ADULT: ICD-10-CM

## 2025-04-22 DIAGNOSIS — I48.20 CHRONIC A-FIB: ICD-10-CM

## 2025-04-22 PROCEDURE — 99214 OFFICE O/P EST MOD 30 MIN: CPT | Performed by: INTERNAL MEDICINE

## 2025-04-22 PROCEDURE — 93000 ELECTROCARDIOGRAM COMPLETE: CPT | Performed by: INTERNAL MEDICINE

## 2025-04-22 PROCEDURE — 3078F DIAST BP <80 MM HG: CPT | Performed by: INTERNAL MEDICINE

## 2025-04-22 PROCEDURE — 3074F SYST BP LT 130 MM HG: CPT | Performed by: INTERNAL MEDICINE

## 2025-04-22 RX ORDER — ATORVASTATIN CALCIUM 80 MG/1
80 TABLET, FILM COATED ORAL
Qty: 90 TABLET | Refills: 3 | Status: SHIPPED | OUTPATIENT
Start: 2025-04-22

## 2025-04-22 NOTE — PROGRESS NOTES
Date of Office Visit: 25  Encounter Provider: Rod Gomes MD  Place of Service: [facility] CARDIOLOGY  Patient Name: Frank Patel  :1955  [id number]    Chief Complaint   Patient presents with    Follow-up   :     HPI: Frank Patel is a 69 y.o. male  In 2019 he had an inferior MI with VF arrest and complicated by severe no reflow very large RCA stented did not have a lot of other disease in his other coronaries LV function at the time of the MI was about 35%.  Follow-up echo showed LV function had normalized.  He also has chronic A. fib and we have not anticoagulated him because he has had some lower GI bleeding.      He is here for follow-up and has been doing okay although he had an upper respiratory tract infection and when he did he had some lightheaded and syncopal spells.  The pain physicians will not do an injection on him until he comes to see us.  He said no chest pain no shortness of breath no PND orthopnea edema no syncope no change in his weight.  His last A1c was 7.4 and 1 before it was 8.3 last HDL was 39 LDL was 76.  He has never had thyroid cancer in the family he does have a lot of constipation like really hard stools but he takes chronic narcotics    Past Medical History:   Diagnosis Date    Acute MI, inferoposterior wall     Atrial fibrillation     BPH (benign prostatic hyperplasia)     DDD (degenerative disc disease), lumbar     Diabetes     Elevated cholesterol     GERD (gastroesophageal reflux disease)     Head trauma     Hemorrhoids     History of shingles     History of snoring     HTN (hypertension)     Hyperlipidemia     Hypogonadism in male     Kidney stone     Muscle spasm     Ventricular fibrillation        Past Surgical History:   Procedure Laterality Date    APPENDECTOMY      CARDIAC CATHETERIZATION N/A 2019    Procedure: Left Heart Cath;  Surgeon: Rod Gomes MD;  Location: [facility] CATH INVASIVE LOCATION;  Service:  Cardiology    CARDIAC CATHETERIZATION N/A 2019    Procedure: Stent ROBY coronary;  Surgeon: Rod Gomes MD;  Location: [facility] CATH INVASIVE LOCATION;  Service: Cardiology    CARDIAC CATHETERIZATION N/A 2019    Procedure: Coronary angiography;  Surgeon: Rod Gomes MD;  Location: [facility] CATH INVASIVE LOCATION;  Service: Cardiology    CARDIAC CATHETERIZATION N/A 2019    Procedure: Left ventriculography;  Surgeon: Rod Gomes MD;  Location: [facility] CATH INVASIVE LOCATION;  Service: Cardiology    COLONOSCOPY      COLONOSCOPY W/ POLYPECTOMY N/A 2023    Procedure: COLONOSCOPY INTO CECUM WITH POLYPECTOMY (COLD SNARE);  Surgeon: Juan Ramon Munroe MD;  Location: [facility] ENDOSCOPY;  Service: Gastroenterology;  Laterality: N/A;  PRE: ANEMIA  POST: POLYP, HEMORRHOIDS    ENDOSCOPY N/A 2023    Procedure: ESOPHAGOGASTRODUODENOSCOPY WITH BIOPSIES;  Surgeon: Juan Ramon Munroe MD;  Location: [facility] ENDOSCOPY;  Service: Gastroenterology;  Laterality: N/A;  PRE: ANEMIA  POST: GASTRITIS    FEMUR FRACTURE SURGERY Right 1990    HAND SURGERY Right     wrist    ORIF FINGER / THUMB FRACTURE Right     SPLENECTOMY  2007    TONSILLECTOMY         Social History     Socioeconomic History    Marital status:      Spouse name: Elvira   Tobacco Use    Smoking status: Former     Current packs/day: 0.00     Types: Cigarettes     Quit date: 1999     Years since quittin.6     Passive exposure: Past    Smokeless tobacco: Never    Tobacco comments:     CAFFEINE USE: 1-2 16OZ COKES DAILY   Vaping Use    Vaping status: Never Used   Substance and Sexual Activity    Alcohol use: Yes     Alcohol/week: 3.0 standard drinks of alcohol     Types: 3 Cans of beer per week     Comment: rarely    Drug use: Never    Sexual activity: Defer       Family History   Problem Relation Age of Onset    Hyperlipidemia Mother     Hypertension Mother     Heart disease Mother     Diabetes Mother     Heart failure  Mother     Heart attack Mother        Review of Systems   Constitutional: Negative for decreased appetite, fever, malaise/fatigue and weight loss.   HENT:  Negative for nosebleeds.    Eyes:  Negative for double vision.   Cardiovascular:  Negative for chest pain, claudication, cyanosis, dyspnea on exertion, irregular heartbeat, leg swelling, near-syncope, orthopnea, palpitations, paroxysmal nocturnal dyspnea and syncope.   Respiratory:  Negative for cough, hemoptysis and shortness of breath.    Hematologic/Lymphatic: Negative for bleeding problem.   Skin:  Negative for rash.   Musculoskeletal:  Negative for falls and myalgias.   Gastrointestinal:  Negative for hematochezia, jaundice, melena, nausea and vomiting.   Genitourinary:  Negative for hematuria.   Neurological:  Negative for dizziness and seizures.   Psychiatric/Behavioral:  Negative for altered mental status and memory loss.        Allergies   Allergen Reactions    Topiramate Other (See Comments)     irritability         Current Outpatient Medications:     atorvastatin (LIPITOR) 40 MG tablet, TAKE 1 TABLET BY MOUTH AT NIGHT, Disp: 90 tablet, Rfl: 3    baclofen (LIORESAL) 10 MG tablet, Take 1 tablet by mouth 3 (Three) Times a Day., Disp: , Rfl:     clopidogrel (PLAVIX) 75 MG tablet, TAKE 1 TABLET BY MOUTH DAILY, Disp: 90 tablet, Rfl: 3    empagliflozin (JARDIANCE) 25 MG tablet tablet, Take 1 tablet by mouth Daily., Disp: , Rfl:     ferrous sulfate 325 (65 FE) MG tablet, Take 1 tablet by mouth 2 (Two) Times a Day With Meals., Disp: 180 tablet, Rfl: 1    fluticasone (FLONASE) 50 MCG/ACT nasal spray, Administer 2 sprays into the nostril(s) as directed by provider Daily As Needed for Rhinitis., Disp: , Rfl:     gabapentin (NEURONTIN) 300 MG capsule, , Disp: , Rfl:     hydroCHLOROthiazide (HYDRODIURIL) 25 MG tablet, Take 1 tablet by mouth Daily., Disp: , Rfl:     HYDROcodone-acetaminophen (NORCO)  MG per tablet, Take 1 tablet by mouth Every 6 (Six) Hours As  "Needed for Moderate Pain., Disp: , Rfl:     lansoprazole (PREVACID) 30 MG capsule, Take 1 capsule by mouth Daily., Disp: , Rfl:     lisinopril (PRINIVIL,ZESTRIL) 5 MG tablet, Take 1 tablet by mouth Daily., Disp: , Rfl:     magnesium oxide (MAG-OX) 400 MG tablet, Take 2 tablets by mouth 2 (Two) Times a Day., Disp: 360 tablet, Rfl: 1    metoprolol succinate XL (TOPROL-XL) 25 MG 24 hr tablet, TAKE ONE AND ONE-HALF TABLETS DAILY, Disp: 135 tablet, Rfl: 2    tamsulosin (FLOMAX) 0.4 MG capsule 24 hr capsule, Take 1 capsule by mouth Every Night., Disp: , Rfl:       Objective:     Vitals:    04/22/25 1316   BP: 124/76   Pulse: 84   Weight: 105 kg (232 lb)   Height: 177.8 cm (70\")     Body mass index is 33.29 kg/m².    Constitutional:       Appearance: Well-developed.   Eyes:      General: No scleral icterus.  HENT:      Head: Normocephalic.   Neck:      Thyroid: No thyromegaly.      Vascular: No JVD.      Lymphadenopathy: No cervical adenopathy.   Pulmonary:      Effort: Pulmonary effort is normal.      Breath sounds: Normal breath sounds. No wheezing. No rales.   Cardiovascular:      Normal rate. Irregularly irregular rhythm.      No gallop.    Edema:     Peripheral edema absent.   Abdominal:      Palpations: Abdomen is soft.      Tenderness: There is no abdominal tenderness.   Musculoskeletal: Normal range of motion. Skin:     General: Skin is warm and dry.      Findings: No rash.   Neurological:      Mental Status: Alert and oriented to person, place, and time.           ECG 12 Lead    Date/Time: 4/22/2025 1:56 PM  Performed by: Rod Gomes MD    Authorized by: Rod Gomes MD  Comparison: compared with previous ECG   Rhythm: atrial fibrillation  Other findings: non-specific ST-T wave changes    Clinical impression: abnormal EKG           Assessment:       Diagnosis Plan   1. Acute MI, inferoposterior wall        2. Chronic a-fib        3. Essential hypertension        4. Hyperlipidemia, unspecified " hyperlipidemia type        5. S/P drug eluting coronary stent placement        6. Class 1 obesity due to excess calories with serious comorbidity and body mass index (BMI) of 33.0 to 33.9 in adult        7. Internal hemorrhoid        8. Degeneration of intervertebral disc of lumbar region with discogenic back pain                   Plan:       From a cardiac standpoint I think his syncopal spells were related to orthostasis related to his illness and that resolved his blood pressures come up and he has been okay.  When I look at the total package here though I think a GLP-1 drug would be a great drug for him potentially although I worry a little bit about constipation I recommended that he take scoop of Metamucil in 8 ounces of water each day see if he can put some bulk in his stools and help with his defecation.  But from a GLP-1 standpoint there is really great data that he did have less cardiac events in the future and if he could lose weight I think his back pain might improve I think his quality of life would improve.  I will try and call his PCP about it I am going to increase his atorvastatin to 80 a day    No follow-ups on file.     As always, it has been a pleasure to participate in your patient's care.      Sincerely,       Rod Gomes MD

## 2025-06-03 RX ORDER — CLOPIDOGREL BISULFATE 75 MG/1
75 TABLET ORAL DAILY
Qty: 90 TABLET | Refills: 3 | Status: SHIPPED | OUTPATIENT
Start: 2025-06-03

## (undated) DEVICE — TREK CORONARY DILATATION CATHETER 3.50 MM X 15 MM / RAPID-EXCHANGE: Brand: TREK

## (undated) DEVICE — CATH DIAG IMPULSE FR4 5F 100CM

## (undated) DEVICE — MSK PROC CURAPLEX O2 2/ADAPT 7FT

## (undated) DEVICE — BND PRESS RADL COMFRT 14IN STRL

## (undated) DEVICE — TUBING, SUCTION, 1/4" X 10', STRAIGHT: Brand: MEDLINE

## (undated) DEVICE — CATH VENT MIV RADL PIG ST TIP 5F 110CM

## (undated) DEVICE — SNAR POLYP CAPTIVATOR RND STFF 2.4 240CM 10MM 1P/U

## (undated) DEVICE — LN SMPL CO2 SHTRM SD STREAM W/M LUER

## (undated) DEVICE — 6F .070 JR 4 100CM: Brand: CORDIS

## (undated) DEVICE — BITEBLOCK OMNI BLOC

## (undated) DEVICE — GW HITORQUE/BAL MID/WT J W/HCOAT .014 3X190CM

## (undated) DEVICE — GW EMR FIX EXCHG J STD .035 3MM 260CM

## (undated) DEVICE — NC TREK CORONARY DILATATION CATHETER 4.0 MM X 20 MM / RAPID-EXCHANGE: Brand: NC TREK

## (undated) DEVICE — PK CATH CARD 40

## (undated) DEVICE — KT MANIFLD CARDIAC

## (undated) DEVICE — ADAPT CLN BIOGUARD AIR/H2O DISP

## (undated) DEVICE — FRCP BX RADJAW4 NDL 2.8 240CM LG OG BX40

## (undated) DEVICE — Device: Brand: TWIN-PASS® DUAL ACCESS CATHETER

## (undated) DEVICE — THE SINGLE USE ETRAP – POLYP TRAP IS USED FOR SUCTION RETRIEVAL OF ENDOSCOPICALLY REMOVED POLYPS.: Brand: ETRAP

## (undated) DEVICE — TREK CORONARY DILATATION CATHETER 4.0 MM X 30 MM / RAPID-EXCHANGE: Brand: TREK

## (undated) DEVICE — GLIDESHEATH BASIC HYDROPHILIC COATED INTRODUCER SHEATH: Brand: GLIDESHEATH

## (undated) DEVICE — SENSR O2 OXIMAX FNGR A/ 18IN NONSTR

## (undated) DEVICE — CATH DIAG IMPULSE FL3.5 5F 100CM

## (undated) DEVICE — KT ORCA ORCAPOD DISP STRL